# Patient Record
Sex: FEMALE | Race: WHITE | Employment: UNEMPLOYED | URBAN - METROPOLITAN AREA
[De-identification: names, ages, dates, MRNs, and addresses within clinical notes are randomized per-mention and may not be internally consistent; named-entity substitution may affect disease eponyms.]

---

## 2017-01-01 ENCOUNTER — APPOINTMENT (OUTPATIENT)
Dept: CT IMAGING | Age: 27
DRG: 637 | End: 2017-01-01
Attending: EMERGENCY MEDICINE
Payer: SELF-PAY

## 2017-01-01 ENCOUNTER — HOSPITAL ENCOUNTER (OUTPATIENT)
Dept: LAB | Age: 27
Discharge: HOME | End: 2017-05-24

## 2017-01-01 ENCOUNTER — APPOINTMENT (OUTPATIENT)
Dept: GENERAL RADIOLOGY | Age: 27
DRG: 638 | End: 2017-01-01
Attending: EMERGENCY MEDICINE
Payer: SELF-PAY

## 2017-01-01 ENCOUNTER — APPOINTMENT (OUTPATIENT)
Dept: CT IMAGING | Age: 27
DRG: 638 | End: 2017-01-01
Attending: EMERGENCY MEDICINE
Payer: SELF-PAY

## 2017-01-01 ENCOUNTER — APPOINTMENT (OUTPATIENT)
Dept: GENERAL RADIOLOGY | Age: 27
DRG: 637 | End: 2017-01-01
Attending: INTERNAL MEDICINE
Payer: SELF-PAY

## 2017-01-01 ENCOUNTER — HOSPITAL ENCOUNTER (INPATIENT)
Age: 27
LOS: 3 days | Discharge: HOME | DRG: 638 | End: 2017-07-18
Attending: EMERGENCY MEDICINE | Admitting: INTERNAL MEDICINE
Payer: SELF-PAY

## 2017-01-01 ENCOUNTER — HOSPITAL ENCOUNTER (INPATIENT)
Age: 27
LOS: 1 days | Discharge: HOME | DRG: 638 | End: 2017-07-28
Attending: EMERGENCY MEDICINE | Admitting: FAMILY MEDICINE
Payer: SELF-PAY

## 2017-01-01 ENCOUNTER — APPOINTMENT (OUTPATIENT)
Dept: GENERAL RADIOLOGY | Age: 27
DRG: 637 | End: 2017-01-01
Attending: EMERGENCY MEDICINE
Payer: SELF-PAY

## 2017-01-01 ENCOUNTER — HOSPITAL ENCOUNTER (OUTPATIENT)
Dept: LAB | Age: 27
Discharge: HOME | End: 2017-07-12

## 2017-01-01 ENCOUNTER — APPOINTMENT (OUTPATIENT)
Dept: NUCLEAR MEDICINE | Age: 27
DRG: 638 | End: 2017-01-01
Attending: HOSPITALIST
Payer: SELF-PAY

## 2017-01-01 ENCOUNTER — HOSPITAL ENCOUNTER (INPATIENT)
Age: 27
LOS: 2 days | Discharge: HOME | DRG: 637 | End: 2017-04-14
Attending: EMERGENCY MEDICINE | Admitting: INTERNAL MEDICINE
Payer: SELF-PAY

## 2017-01-01 ENCOUNTER — HOSPITAL ENCOUNTER (INPATIENT)
Age: 27
LOS: 4 days | Discharge: HOME | DRG: 637 | End: 2017-03-15
Attending: EMERGENCY MEDICINE | Admitting: INTERNAL MEDICINE
Payer: SELF-PAY

## 2017-01-01 ENCOUNTER — HOSPITAL ENCOUNTER (INPATIENT)
Age: 27
LOS: 3 days | Discharge: HOME | DRG: 637 | End: 2017-02-24
Attending: EMERGENCY MEDICINE | Admitting: INTERNAL MEDICINE
Payer: SELF-PAY

## 2017-01-01 ENCOUNTER — HOSPITAL ENCOUNTER (INPATIENT)
Age: 27
LOS: 2 days | Discharge: HOME | DRG: 638 | End: 2017-05-22
Attending: EMERGENCY MEDICINE | Admitting: INTERNAL MEDICINE
Payer: SELF-PAY

## 2017-01-01 ENCOUNTER — HOSPITAL ENCOUNTER (OUTPATIENT)
Dept: LAB | Age: 27
Discharge: HOME | End: 2017-01-04

## 2017-01-01 VITALS
OXYGEN SATURATION: 99 % | DIASTOLIC BLOOD PRESSURE: 77 MMHG | BODY MASS INDEX: 23.39 KG/M2 | SYSTOLIC BLOOD PRESSURE: 110 MMHG | TEMPERATURE: 98.8 F | HEART RATE: 80 BPM | RESPIRATION RATE: 14 BRPM | WEIGHT: 127.87 LBS

## 2017-01-01 VITALS
WEIGHT: 115 LBS | RESPIRATION RATE: 18 BRPM | TEMPERATURE: 99.3 F | DIASTOLIC BLOOD PRESSURE: 83 MMHG | SYSTOLIC BLOOD PRESSURE: 118 MMHG | BODY MASS INDEX: 18.48 KG/M2 | HEART RATE: 91 BPM | HEIGHT: 66 IN | OXYGEN SATURATION: 100 %

## 2017-01-01 VITALS
RESPIRATION RATE: 16 BRPM | HEIGHT: 66 IN | WEIGHT: 124.78 LBS | BODY MASS INDEX: 20.05 KG/M2 | DIASTOLIC BLOOD PRESSURE: 73 MMHG | SYSTOLIC BLOOD PRESSURE: 106 MMHG | TEMPERATURE: 98.4 F | OXYGEN SATURATION: 100 % | HEART RATE: 84 BPM

## 2017-01-01 VITALS
OXYGEN SATURATION: 98 % | TEMPERATURE: 98 F | WEIGHT: 120.15 LBS | HEIGHT: 62 IN | RESPIRATION RATE: 18 BRPM | DIASTOLIC BLOOD PRESSURE: 66 MMHG | BODY MASS INDEX: 22.11 KG/M2 | HEART RATE: 78 BPM | SYSTOLIC BLOOD PRESSURE: 107 MMHG

## 2017-01-01 VITALS
HEIGHT: 65 IN | BODY MASS INDEX: 18.44 KG/M2 | WEIGHT: 110.67 LBS | OXYGEN SATURATION: 92 % | RESPIRATION RATE: 16 BRPM | TEMPERATURE: 97.5 F | DIASTOLIC BLOOD PRESSURE: 76 MMHG | SYSTOLIC BLOOD PRESSURE: 107 MMHG | HEART RATE: 85 BPM

## 2017-01-01 VITALS
TEMPERATURE: 98.2 F | OXYGEN SATURATION: 98 % | RESPIRATION RATE: 20 BRPM | BODY MASS INDEX: 24.59 KG/M2 | SYSTOLIC BLOOD PRESSURE: 98 MMHG | WEIGHT: 133.6 LBS | HEART RATE: 84 BPM | HEIGHT: 62 IN | DIASTOLIC BLOOD PRESSURE: 63 MMHG

## 2017-01-01 DIAGNOSIS — E10.11 DIABETIC KETOACIDOSIS WITH COMA ASSOCIATED WITH TYPE 1 DIABETES MELLITUS (HCC): Primary | ICD-10-CM

## 2017-01-01 DIAGNOSIS — E08.10 DIABETIC KETOACIDOSIS WITHOUT COMA ASSOCIATED WITH DIABETES MELLITUS DUE TO UNDERLYING CONDITION (HCC): Primary | ICD-10-CM

## 2017-01-01 DIAGNOSIS — G93.40 ACUTE ENCEPHALOPATHY: ICD-10-CM

## 2017-01-01 DIAGNOSIS — D72.829 LEUKOCYTOSIS, UNSPECIFIED TYPE: ICD-10-CM

## 2017-01-01 DIAGNOSIS — R56.9 SEIZURE (HCC): ICD-10-CM

## 2017-01-01 DIAGNOSIS — E10.10 DIABETIC KETOACIDOSIS WITHOUT COMA ASSOCIATED WITH TYPE 1 DIABETES MELLITUS (HCC): Primary | ICD-10-CM

## 2017-01-01 DIAGNOSIS — N17.9 ACUTE RENAL FAILURE, UNSPECIFIED ACUTE RENAL FAILURE TYPE (HCC): ICD-10-CM

## 2017-01-01 DIAGNOSIS — R00.0 SINUS TACHYCARDIA: ICD-10-CM

## 2017-01-01 DIAGNOSIS — E87.20 ACIDOSIS, METABOLIC: ICD-10-CM

## 2017-01-01 DIAGNOSIS — E87.20 LACTIC ACIDOSIS: ICD-10-CM

## 2017-01-01 DIAGNOSIS — E10.10 DKA, TYPE 1, NOT AT GOAL (HCC): Primary | ICD-10-CM

## 2017-01-01 DIAGNOSIS — E86.0 DEHYDRATION: ICD-10-CM

## 2017-01-01 DIAGNOSIS — E08.11 DIABETIC KETOACIDOSIS WITH COMA ASSOCIATED WITH DIABETES MELLITUS DUE TO UNDERLYING CONDITION (HCC): Primary | ICD-10-CM

## 2017-01-01 LAB
ADMINISTERED INITIALS, ADMINIT: NORMAL
ALBUMIN SERPL BCP-MCNC: 2.3 G/DL (ref 3.5–5)
ALBUMIN SERPL BCP-MCNC: 2.3 G/DL (ref 3.5–5)
ALBUMIN SERPL BCP-MCNC: 2.4 G/DL (ref 3.5–5)
ALBUMIN SERPL BCP-MCNC: 2.6 G/DL (ref 3.5–5)
ALBUMIN SERPL BCP-MCNC: 2.6 G/DL (ref 3.5–5)
ALBUMIN SERPL BCP-MCNC: 2.7 G/DL (ref 3.5–5)
ALBUMIN SERPL BCP-MCNC: 2.9 G/DL (ref 3.5–5)
ALBUMIN SERPL BCP-MCNC: 2.9 G/DL (ref 3.5–5)
ALBUMIN SERPL BCP-MCNC: 3.2 G/DL (ref 3.5–5)
ALBUMIN SERPL BCP-MCNC: 3.3 G/DL (ref 3.5–5)
ALBUMIN SERPL BCP-MCNC: 3.3 G/DL (ref 3.5–5)
ALBUMIN SERPL BCP-MCNC: 3.4 G/DL (ref 3.5–5)
ALBUMIN SERPL BCP-MCNC: 3.4 G/DL (ref 3.5–5)
ALBUMIN SERPL BCP-MCNC: 3.8 G/DL (ref 3.5–5)
ALBUMIN/GLOB SERPL: 0.7 {RATIO} (ref 1.1–2.2)
ALBUMIN/GLOB SERPL: 0.8 {RATIO} (ref 1.1–2.2)
ALBUMIN/GLOB SERPL: 0.9 {RATIO} (ref 1.1–2.2)
ALBUMIN/GLOB SERPL: 1 {RATIO} (ref 1.1–2.2)
ALP SERPL-CCNC: 103 U/L (ref 45–117)
ALP SERPL-CCNC: 114 U/L (ref 45–117)
ALP SERPL-CCNC: 120 U/L (ref 45–117)
ALP SERPL-CCNC: 127 U/L (ref 45–117)
ALP SERPL-CCNC: 129 U/L (ref 45–117)
ALP SERPL-CCNC: 131 U/L (ref 45–117)
ALP SERPL-CCNC: 147 U/L (ref 45–117)
ALP SERPL-CCNC: 150 U/L (ref 45–117)
ALP SERPL-CCNC: 160 U/L (ref 45–117)
ALP SERPL-CCNC: 166 U/L (ref 45–117)
ALP SERPL-CCNC: 203 U/L (ref 45–117)
ALP SERPL-CCNC: 210 U/L (ref 45–117)
ALP SERPL-CCNC: 90 U/L (ref 45–117)
ALP SERPL-CCNC: 92 U/L (ref 45–117)
ALT SERPL-CCNC: 16 U/L (ref 12–78)
ALT SERPL-CCNC: 16 U/L (ref 12–78)
ALT SERPL-CCNC: 17 U/L (ref 12–78)
ALT SERPL-CCNC: 20 U/L (ref 12–78)
ALT SERPL-CCNC: 21 U/L (ref 12–78)
ALT SERPL-CCNC: 22 U/L (ref 12–78)
ALT SERPL-CCNC: 25 U/L (ref 12–78)
ALT SERPL-CCNC: 25 U/L (ref 12–78)
ALT SERPL-CCNC: 26 U/L (ref 12–78)
ALT SERPL-CCNC: 27 U/L (ref 12–78)
ALT SERPL-CCNC: 29 U/L (ref 12–78)
ALT SERPL-CCNC: 33 U/L (ref 12–78)
ALT SERPL-CCNC: 37 U/L (ref 12–78)
ALT SERPL-CCNC: 44 U/L (ref 12–78)
AMORPH CRY URNS QL MICRO: ABNORMAL
AMORPH CRY URNS QL MICRO: ABNORMAL
AMPHET UR QL SCN: NEGATIVE
AMYLASE SERPL-CCNC: 144 U/L (ref 25–115)
ANION GAP BLD CALC-SCNC: 10 MMOL/L (ref 5–15)
ANION GAP BLD CALC-SCNC: 11 MMOL/L (ref 5–15)
ANION GAP BLD CALC-SCNC: 12 MMOL/L (ref 5–15)
ANION GAP BLD CALC-SCNC: 13 MMOL/L (ref 5–15)
ANION GAP BLD CALC-SCNC: 13 MMOL/L (ref 5–15)
ANION GAP BLD CALC-SCNC: 14 MMOL/L (ref 5–15)
ANION GAP BLD CALC-SCNC: 15 MMOL/L (ref 5–15)
ANION GAP BLD CALC-SCNC: 17 MMOL/L (ref 5–15)
ANION GAP BLD CALC-SCNC: 17 MMOL/L (ref 5–15)
ANION GAP BLD CALC-SCNC: 18 MMOL/L (ref 5–15)
ANION GAP BLD CALC-SCNC: 19 MMOL/L (ref 5–15)
ANION GAP BLD CALC-SCNC: 20 MMOL/L (ref 5–15)
ANION GAP BLD CALC-SCNC: 21 MMOL/L (ref 5–15)
ANION GAP BLD CALC-SCNC: 22 MMOL/L (ref 5–15)
ANION GAP BLD CALC-SCNC: 22 MMOL/L (ref 5–15)
ANION GAP BLD CALC-SCNC: 25 MMOL/L (ref 5–15)
ANION GAP BLD CALC-SCNC: 26 MMOL/L (ref 5–15)
ANION GAP BLD CALC-SCNC: 28 MMOL/L (ref 5–15)
ANION GAP BLD CALC-SCNC: 34 MMOL/L (ref 5–15)
ANION GAP BLD CALC-SCNC: 34 MMOL/L (ref 5–15)
ANION GAP BLD CALC-SCNC: 7 MMOL/L (ref 5–15)
ANION GAP BLD CALC-SCNC: 8 MMOL/L (ref 5–15)
ANION GAP BLD CALC-SCNC: 9 MMOL/L (ref 5–15)
ANION GAP BLD CALC-SCNC: ABNORMAL MMOL/L (ref 5–15)
APPEARANCE UR: ABNORMAL
APPEARANCE UR: CLEAR
APPEARANCE UR: CLEAR
ARTERIAL PATENCY WRIST A: ABNORMAL
ARTERIAL PATENCY WRIST A: YES
AST SERPL W P-5'-P-CCNC: 13 U/L (ref 15–37)
AST SERPL W P-5'-P-CCNC: 16 U/L (ref 15–37)
AST SERPL W P-5'-P-CCNC: 18 U/L (ref 15–37)
AST SERPL W P-5'-P-CCNC: 21 U/L (ref 15–37)
AST SERPL W P-5'-P-CCNC: 22 U/L (ref 15–37)
AST SERPL W P-5'-P-CCNC: 24 U/L (ref 15–37)
AST SERPL W P-5'-P-CCNC: 25 U/L (ref 15–37)
AST SERPL W P-5'-P-CCNC: 25 U/L (ref 15–37)
AST SERPL W P-5'-P-CCNC: 33 U/L (ref 15–37)
AST SERPL W P-5'-P-CCNC: 35 U/L (ref 15–37)
AST SERPL W P-5'-P-CCNC: 6 U/L (ref 15–37)
AST SERPL W P-5'-P-CCNC: 61 U/L (ref 15–37)
ATRIAL RATE: 104 BPM
ATRIAL RATE: 104 BPM
ATRIAL RATE: 118 BPM
B-OH-BUTYR SERPL-SCNC: >4.42 MMOL/L
B-OH-BUTYR SERPL-SCNC: >4.42 MMOL/L
BACTERIA SPEC CULT: NORMAL
BACTERIA URNS QL MICRO: ABNORMAL /HPF
BACTERIA URNS QL MICRO: NEGATIVE /HPF
BARBITURATES UR QL SCN: NEGATIVE
BASE DEFICIT BLD-SCNC: 17 MMOL/L
BASE DEFICIT BLD-SCNC: 27 MMOL/L
BASE DEFICIT BLD-SCNC: 9 MMOL/L
BASE DEFICIT BLDA-SCNC: 26.5 MMOL/L
BASE DEFICIT BLDA-SCNC: 28 MMOL/L
BASE DEFICIT BLDA-SCNC: 32.3 MMOL/L
BASE DEFICIT BLDV-SCNC: 30.7 MMOL/L
BASE DEFICIT BLDV-SCNC: 34.9 MMOL/L
BASE DEFICIT BLDV-SCNC: <30 MMOL/L
BASE EXCESS BLDA CALC-SCNC: 1 MMOL/L
BASE EXCESS BLDA CALC-SCNC: 1.3 MMOL/L
BASE EXCESS BLDA CALC-SCNC: 1.4 MMOL/L
BASOPHILS # BLD AUTO: 0 K/UL
BASOPHILS # BLD AUTO: 0 K/UL (ref 0–0.1)
BASOPHILS # BLD AUTO: 0.1 K/UL
BASOPHILS # BLD AUTO: 0.3 K/UL
BASOPHILS # BLD AUTO: 0.3 K/UL (ref 0–0.1)
BASOPHILS # BLD: 0 %
BASOPHILS # BLD: 0 % (ref 0–1)
BASOPHILS # BLD: 1 %
BASOPHILS # BLD: 1 % (ref 0–1)
BASOPHILS # BLD: 1 % (ref 0–1)
BASOPHILS # BLD: 2 %
BDY SITE: ABNORMAL
BDY SITE: NORMAL
BENZODIAZ UR QL: NEGATIVE
BENZODIAZ UR QL: POSITIVE
BILIRUB SERPL-MCNC: 0.4 MG/DL (ref 0.2–1)
BILIRUB SERPL-MCNC: 0.5 MG/DL (ref 0.2–1)
BILIRUB SERPL-MCNC: 0.6 MG/DL (ref 0.2–1)
BILIRUB SERPL-MCNC: 0.6 MG/DL (ref 0.2–1)
BILIRUB UR QL CFM: NEGATIVE
BILIRUB UR QL: NEGATIVE
BNP SERPL-MCNC: 16 PG/ML (ref 0–100)
BREATHS.SPONTANEOUS ON VENT: 14
BREATHS.SPONTANEOUS ON VENT: 28
BREATHS.SPONTANEOUS ON VENT: 8
BREATHS.SPONTANEOUS ON VENT: 9
BUN BLD-MCNC: 27 MG/DL (ref 9–20)
BUN SERPL-MCNC: 10 MG/DL (ref 6–20)
BUN SERPL-MCNC: 12 MG/DL (ref 6–20)
BUN SERPL-MCNC: 12 MG/DL (ref 6–20)
BUN SERPL-MCNC: 13 MG/DL (ref 6–20)
BUN SERPL-MCNC: 14 MG/DL (ref 6–20)
BUN SERPL-MCNC: 15 MG/DL (ref 6–20)
BUN SERPL-MCNC: 15 MG/DL (ref 6–20)
BUN SERPL-MCNC: 16 MG/DL (ref 6–20)
BUN SERPL-MCNC: 17 MG/DL (ref 6–20)
BUN SERPL-MCNC: 17 MG/DL (ref 6–20)
BUN SERPL-MCNC: 18 MG/DL (ref 6–20)
BUN SERPL-MCNC: 19 MG/DL (ref 6–20)
BUN SERPL-MCNC: 19 MG/DL (ref 6–20)
BUN SERPL-MCNC: 21 MG/DL (ref 6–20)
BUN SERPL-MCNC: 22 MG/DL (ref 6–20)
BUN SERPL-MCNC: 22 MG/DL (ref 6–20)
BUN SERPL-MCNC: 23 MG/DL (ref 6–20)
BUN SERPL-MCNC: 23 MG/DL (ref 6–20)
BUN SERPL-MCNC: 24 MG/DL (ref 6–20)
BUN SERPL-MCNC: 24 MG/DL (ref 6–20)
BUN SERPL-MCNC: 25 MG/DL (ref 6–20)
BUN SERPL-MCNC: 26 MG/DL (ref 6–20)
BUN SERPL-MCNC: 26 MG/DL (ref 6–20)
BUN SERPL-MCNC: 28 MG/DL (ref 6–20)
BUN SERPL-MCNC: 29 MG/DL (ref 6–20)
BUN SERPL-MCNC: 30 MG/DL (ref 6–20)
BUN SERPL-MCNC: 32 MG/DL (ref 6–20)
BUN SERPL-MCNC: 33 MG/DL (ref 6–20)
BUN SERPL-MCNC: 34 MG/DL (ref 6–20)
BUN SERPL-MCNC: 5 MG/DL (ref 6–20)
BUN SERPL-MCNC: 6 MG/DL (ref 6–20)
BUN SERPL-MCNC: 6 MG/DL (ref 6–20)
BUN SERPL-MCNC: 7 MG/DL (ref 6–20)
BUN SERPL-MCNC: 7 MG/DL (ref 6–20)
BUN SERPL-MCNC: 8 MG/DL (ref 6–20)
BUN SERPL-MCNC: 9 MG/DL (ref 6–20)
BUN SERPL-MCNC: 9 MG/DL (ref 6–20)
BUN/CREAT SERPL: 10 (ref 12–20)
BUN/CREAT SERPL: 11 (ref 12–20)
BUN/CREAT SERPL: 12 (ref 12–20)
BUN/CREAT SERPL: 13 (ref 12–20)
BUN/CREAT SERPL: 14 (ref 12–20)
BUN/CREAT SERPL: 15 (ref 12–20)
BUN/CREAT SERPL: 16 (ref 12–20)
BUN/CREAT SERPL: 17 (ref 12–20)
BUN/CREAT SERPL: 17 (ref 12–20)
BUN/CREAT SERPL: 18 (ref 12–20)
BUN/CREAT SERPL: 19 (ref 12–20)
BUN/CREAT SERPL: 20 (ref 12–20)
BUN/CREAT SERPL: 20 (ref 12–20)
BUN/CREAT SERPL: 25 (ref 12–20)
BUN/CREAT SERPL: 27 (ref 12–20)
BUN/CREAT SERPL: 28 (ref 12–20)
BUN/CREAT SERPL: 32 (ref 12–20)
BUN/CREAT SERPL: 7 (ref 12–20)
BUN/CREAT SERPL: 7 (ref 12–20)
BUN/CREAT SERPL: 8 (ref 12–20)
BUN/CREAT SERPL: 9 (ref 12–20)
CA-I BLD-MCNC: 1.1 MMOL/L (ref 1.12–1.32)
CA-I BLD-SCNC: 1.34 MMOL/L (ref 1.12–1.32)
CALCIUM SERPL-MCNC: 6.7 MG/DL (ref 8.5–10.1)
CALCIUM SERPL-MCNC: 6.8 MG/DL (ref 8.5–10.1)
CALCIUM SERPL-MCNC: 6.8 MG/DL (ref 8.5–10.1)
CALCIUM SERPL-MCNC: 7 MG/DL (ref 8.5–10.1)
CALCIUM SERPL-MCNC: 7.1 MG/DL (ref 8.5–10.1)
CALCIUM SERPL-MCNC: 7.1 MG/DL (ref 8.5–10.1)
CALCIUM SERPL-MCNC: 7.2 MG/DL (ref 8.5–10.1)
CALCIUM SERPL-MCNC: 7.2 MG/DL (ref 8.5–10.1)
CALCIUM SERPL-MCNC: 7.3 MG/DL (ref 8.5–10.1)
CALCIUM SERPL-MCNC: 7.3 MG/DL (ref 8.5–10.1)
CALCIUM SERPL-MCNC: 7.4 MG/DL (ref 8.5–10.1)
CALCIUM SERPL-MCNC: 7.5 MG/DL (ref 8.5–10.1)
CALCIUM SERPL-MCNC: 7.5 MG/DL (ref 8.5–10.1)
CALCIUM SERPL-MCNC: 7.6 MG/DL (ref 8.5–10.1)
CALCIUM SERPL-MCNC: 7.6 MG/DL (ref 8.5–10.1)
CALCIUM SERPL-MCNC: 7.7 MG/DL (ref 8.5–10.1)
CALCIUM SERPL-MCNC: 7.7 MG/DL (ref 8.5–10.1)
CALCIUM SERPL-MCNC: 7.8 MG/DL (ref 8.5–10.1)
CALCIUM SERPL-MCNC: 7.9 MG/DL (ref 8.5–10.1)
CALCIUM SERPL-MCNC: 8 MG/DL (ref 8.5–10.1)
CALCIUM SERPL-MCNC: 8.1 MG/DL (ref 8.5–10.1)
CALCIUM SERPL-MCNC: 8.2 MG/DL (ref 8.5–10.1)
CALCIUM SERPL-MCNC: 8.3 MG/DL (ref 8.5–10.1)
CALCIUM SERPL-MCNC: 8.4 MG/DL (ref 8.5–10.1)
CALCIUM SERPL-MCNC: 8.5 MG/DL (ref 8.5–10.1)
CALCIUM SERPL-MCNC: 8.6 MG/DL (ref 8.5–10.1)
CALCIUM SERPL-MCNC: 8.6 MG/DL (ref 8.5–10.1)
CALCIUM SERPL-MCNC: 8.7 MG/DL (ref 8.5–10.1)
CALCIUM SERPL-MCNC: 8.8 MG/DL (ref 8.5–10.1)
CALCIUM SERPL-MCNC: 8.9 MG/DL (ref 8.5–10.1)
CALCIUM SERPL-MCNC: 8.9 MG/DL (ref 8.5–10.1)
CALCIUM SERPL-MCNC: 9.3 MG/DL (ref 8.5–10.1)
CALCIUM SERPL-MCNC: 9.5 MG/DL (ref 8.5–10.1)
CALCIUM SERPL-MCNC: 9.7 MG/DL (ref 8.5–10.1)
CALCULATED P AXIS, ECG09: 65 DEGREES
CALCULATED P AXIS, ECG09: 66 DEGREES
CALCULATED P AXIS, ECG09: 70 DEGREES
CALCULATED R AXIS, ECG10: 63 DEGREES
CALCULATED R AXIS, ECG10: 64 DEGREES
CALCULATED R AXIS, ECG10: 66 DEGREES
CALCULATED T AXIS, ECG11: 13 DEGREES
CALCULATED T AXIS, ECG11: 22 DEGREES
CALCULATED T AXIS, ECG11: 29 DEGREES
CANNABINOIDS UR QL SCN: NEGATIVE
CC UR VC: NORMAL
CC UR VC: NORMAL
CHLORIDE BLD-SCNC: 110 MMOL/L (ref 98–107)
CHLORIDE SERPL-SCNC: 100 MMOL/L (ref 97–108)
CHLORIDE SERPL-SCNC: 101 MMOL/L (ref 97–108)
CHLORIDE SERPL-SCNC: 103 MMOL/L (ref 97–108)
CHLORIDE SERPL-SCNC: 103 MMOL/L (ref 97–108)
CHLORIDE SERPL-SCNC: 104 MMOL/L (ref 97–108)
CHLORIDE SERPL-SCNC: 106 MMOL/L (ref 97–108)
CHLORIDE SERPL-SCNC: 108 MMOL/L (ref 97–108)
CHLORIDE SERPL-SCNC: 109 MMOL/L (ref 97–108)
CHLORIDE SERPL-SCNC: 110 MMOL/L (ref 97–108)
CHLORIDE SERPL-SCNC: 111 MMOL/L (ref 97–108)
CHLORIDE SERPL-SCNC: 112 MMOL/L (ref 97–108)
CHLORIDE SERPL-SCNC: 113 MMOL/L (ref 97–108)
CHLORIDE SERPL-SCNC: 114 MMOL/L (ref 97–108)
CHLORIDE SERPL-SCNC: 115 MMOL/L (ref 97–108)
CHLORIDE SERPL-SCNC: 117 MMOL/L (ref 97–108)
CHLORIDE SERPL-SCNC: 120 MMOL/L (ref 97–108)
CHLORIDE SERPL-SCNC: 123 MMOL/L (ref 97–108)
CHLORIDE SERPL-SCNC: 86 MMOL/L (ref 97–108)
CHLORIDE SERPL-SCNC: 90 MMOL/L (ref 97–108)
CHLORIDE SERPL-SCNC: 91 MMOL/L (ref 97–108)
CHLORIDE SERPL-SCNC: 92 MMOL/L (ref 97–108)
CHLORIDE SERPL-SCNC: 94 MMOL/L (ref 97–108)
CHLORIDE SERPL-SCNC: 96 MMOL/L (ref 97–108)
CHLORIDE SERPL-SCNC: 97 MMOL/L (ref 97–108)
CHLORIDE SERPL-SCNC: 99 MMOL/L (ref 97–108)
CHOLEST SERPL-MCNC: 311 MG/DL
CHOLEST SERPL-MCNC: 324 MG/DL
CHOLEST SERPL-MCNC: 328 MG/DL
CK SERPL-CCNC: 129 U/L (ref 26–192)
CO2 BLD-SCNC: <5 MMOL/L (ref 21–32)
CO2 SERPL-SCNC: 11 MMOL/L (ref 21–32)
CO2 SERPL-SCNC: 11 MMOL/L (ref 21–32)
CO2 SERPL-SCNC: 12 MMOL/L (ref 21–32)
CO2 SERPL-SCNC: 14 MMOL/L (ref 21–32)
CO2 SERPL-SCNC: 14 MMOL/L (ref 21–32)
CO2 SERPL-SCNC: 15 MMOL/L (ref 21–32)
CO2 SERPL-SCNC: 17 MMOL/L (ref 21–32)
CO2 SERPL-SCNC: 17 MMOL/L (ref 21–32)
CO2 SERPL-SCNC: 18 MMOL/L (ref 21–32)
CO2 SERPL-SCNC: 19 MMOL/L (ref 21–32)
CO2 SERPL-SCNC: 21 MMOL/L (ref 21–32)
CO2 SERPL-SCNC: 21 MMOL/L (ref 21–32)
CO2 SERPL-SCNC: 22 MMOL/L (ref 21–32)
CO2 SERPL-SCNC: 23 MMOL/L (ref 21–32)
CO2 SERPL-SCNC: 24 MMOL/L (ref 21–32)
CO2 SERPL-SCNC: 25 MMOL/L (ref 21–32)
CO2 SERPL-SCNC: 26 MMOL/L (ref 21–32)
CO2 SERPL-SCNC: 26 MMOL/L (ref 21–32)
CO2 SERPL-SCNC: 27 MMOL/L (ref 21–32)
CO2 SERPL-SCNC: 28 MMOL/L (ref 21–32)
CO2 SERPL-SCNC: 30 MMOL/L (ref 21–32)
CO2 SERPL-SCNC: 30 MMOL/L (ref 21–32)
CO2 SERPL-SCNC: 5 MMOL/L (ref 21–32)
CO2 SERPL-SCNC: 7 MMOL/L (ref 21–32)
CO2 SERPL-SCNC: 8 MMOL/L (ref 21–32)
CO2 SERPL-SCNC: 9 MMOL/L (ref 21–32)
CO2 SERPL-SCNC: <5 MMOL/L (ref 21–32)
COCAINE UR QL SCN: NEGATIVE
COCAINE UR QL SCN: POSITIVE
COLOR UR: ABNORMAL
CREAT BLD-MCNC: 0.9 MG/DL (ref 0.6–1.3)
CREAT SERPL-MCNC: 0.56 MG/DL (ref 0.55–1.02)
CREAT SERPL-MCNC: 0.56 MG/DL (ref 0.55–1.02)
CREAT SERPL-MCNC: 0.57 MG/DL (ref 0.55–1.02)
CREAT SERPL-MCNC: 0.61 MG/DL (ref 0.55–1.02)
CREAT SERPL-MCNC: 0.64 MG/DL (ref 0.55–1.02)
CREAT SERPL-MCNC: 0.7 MG/DL (ref 0.55–1.02)
CREAT SERPL-MCNC: 0.78 MG/DL (ref 0.55–1.02)
CREAT SERPL-MCNC: 0.79 MG/DL (ref 0.55–1.02)
CREAT SERPL-MCNC: 0.91 MG/DL (ref 0.55–1.02)
CREAT SERPL-MCNC: 0.94 MG/DL (ref 0.55–1.02)
CREAT SERPL-MCNC: 0.99 MG/DL (ref 0.55–1.02)
CREAT SERPL-MCNC: 1 MG/DL (ref 0.55–1.02)
CREAT SERPL-MCNC: 1.03 MG/DL (ref 0.55–1.02)
CREAT SERPL-MCNC: 1.03 MG/DL (ref 0.55–1.02)
CREAT SERPL-MCNC: 1.04 MG/DL (ref 0.55–1.02)
CREAT SERPL-MCNC: 1.04 MG/DL (ref 0.55–1.02)
CREAT SERPL-MCNC: 1.05 MG/DL (ref 0.55–1.02)
CREAT SERPL-MCNC: 1.06 MG/DL (ref 0.55–1.02)
CREAT SERPL-MCNC: 1.06 MG/DL (ref 0.55–1.02)
CREAT SERPL-MCNC: 1.07 MG/DL (ref 0.55–1.02)
CREAT SERPL-MCNC: 1.09 MG/DL (ref 0.55–1.02)
CREAT SERPL-MCNC: 1.14 MG/DL (ref 0.55–1.02)
CREAT SERPL-MCNC: 1.23 MG/DL (ref 0.55–1.02)
CREAT SERPL-MCNC: 1.27 MG/DL (ref 0.55–1.02)
CREAT SERPL-MCNC: 1.32 MG/DL (ref 0.55–1.02)
CREAT SERPL-MCNC: 1.37 MG/DL (ref 0.55–1.02)
CREAT SERPL-MCNC: 1.38 MG/DL (ref 0.55–1.02)
CREAT SERPL-MCNC: 1.42 MG/DL (ref 0.55–1.02)
CREAT SERPL-MCNC: 1.43 MG/DL (ref 0.55–1.02)
CREAT SERPL-MCNC: 1.46 MG/DL (ref 0.55–1.02)
CREAT SERPL-MCNC: 1.59 MG/DL (ref 0.55–1.02)
CREAT SERPL-MCNC: 1.61 MG/DL (ref 0.55–1.02)
CREAT SERPL-MCNC: 1.64 MG/DL (ref 0.55–1.02)
CREAT SERPL-MCNC: 1.7 MG/DL (ref 0.55–1.02)
CREAT SERPL-MCNC: 1.76 MG/DL (ref 0.55–1.02)
CREAT SERPL-MCNC: 1.78 MG/DL (ref 0.55–1.02)
CREAT SERPL-MCNC: 1.8 MG/DL (ref 0.55–1.02)
CREAT SERPL-MCNC: 1.81 MG/DL (ref 0.55–1.02)
CREAT SERPL-MCNC: 1.84 MG/DL (ref 0.55–1.02)
CREAT SERPL-MCNC: 1.87 MG/DL (ref 0.55–1.02)
CREAT SERPL-MCNC: 2.3 MG/DL (ref 0.55–1.02)
D50 ADMINISTERED, D50ADM: 0 ML
D50 ORDER, D50ORD: 0 ML
DIAGNOSIS, 93000: NORMAL
DIFFERENTIAL METHOD BLD: ABNORMAL
DRUG SCRN COMMENT,DRGCM: ABNORMAL
DRUG SCRN COMMENT,DRGCM: ABNORMAL
DRUG SCRN COMMENT,DRGCM: NORMAL
EOSINOPHIL # BLD: 0 K/UL
EOSINOPHIL # BLD: 0 K/UL (ref 0–0.4)
EOSINOPHIL # BLD: 0.1 K/UL (ref 0–0.4)
EOSINOPHIL NFR BLD: 0 %
EOSINOPHIL NFR BLD: 0 % (ref 0–7)
EOSINOPHIL NFR BLD: 1 % (ref 0–7)
EOSINOPHIL NFR BLD: 2 % (ref 0–7)
EPAP/CPAP/PEEP, PAPEEP: 5
EPITH CASTS URNS QL MICRO: ABNORMAL /LPF
ERYTHROCYTE [DISTWIDTH] IN BLOOD BY AUTOMATED COUNT: 13.4 % (ref 11.5–14.5)
ERYTHROCYTE [DISTWIDTH] IN BLOOD BY AUTOMATED COUNT: 13.5 % (ref 11.5–14.5)
ERYTHROCYTE [DISTWIDTH] IN BLOOD BY AUTOMATED COUNT: 14.3 % (ref 11.5–14.5)
ERYTHROCYTE [DISTWIDTH] IN BLOOD BY AUTOMATED COUNT: 14.6 % (ref 11.5–14.5)
ERYTHROCYTE [DISTWIDTH] IN BLOOD BY AUTOMATED COUNT: 14.6 % (ref 11.5–14.5)
ERYTHROCYTE [DISTWIDTH] IN BLOOD BY AUTOMATED COUNT: 14.7 % (ref 11.5–14.5)
ERYTHROCYTE [DISTWIDTH] IN BLOOD BY AUTOMATED COUNT: 14.8 % (ref 11.5–14.5)
ERYTHROCYTE [DISTWIDTH] IN BLOOD BY AUTOMATED COUNT: 14.9 % (ref 11.5–14.5)
ERYTHROCYTE [DISTWIDTH] IN BLOOD BY AUTOMATED COUNT: 15 % (ref 11.5–14.5)
ERYTHROCYTE [DISTWIDTH] IN BLOOD BY AUTOMATED COUNT: 15.1 % (ref 11.5–14.5)
EST. AVERAGE GLUCOSE BLD GHB EST-MCNC: 341 MG/DL
EST. AVERAGE GLUCOSE BLD GHB EST-MCNC: 352 MG/DL
EST. AVERAGE GLUCOSE BLD GHB EST-MCNC: 355 MG/DL
EST. AVERAGE GLUCOSE BLD GHB EST-MCNC: 358 MG/DL
EST. AVERAGE GLUCOSE BLD GHB EST-MCNC: 361 MG/DL
EST. AVERAGE GLUCOSE BLD GHB EST-MCNC: 361 MG/DL
EST. AVERAGE GLUCOSE BLD GHB EST-MCNC: 367 MG/DL
EST. AVERAGE GLUCOSE BLD GHB EST-MCNC: 372 MG/DL
EST. AVERAGE GLUCOSE BLD GHB EST-MCNC: 372 MG/DL
EST. AVERAGE GLUCOSE BLD GHB EST-MCNC: 381 MG/DL
ETHANOL SERPL-MCNC: <10 MG/DL
ETHANOL SERPL-MCNC: <10 MG/DL
FIO2 ON VENT: 40 %
GAS FLOW.O2 O2 DELIVERY SYS: ABNORMAL L/MIN
GAS FLOW.O2 SETTING OXYMISER: 16 L/MIN
GLOBULIN SER CALC-MCNC: 2.9 G/DL (ref 2–4)
GLOBULIN SER CALC-MCNC: 3 G/DL (ref 2–4)
GLOBULIN SER CALC-MCNC: 3 G/DL (ref 2–4)
GLOBULIN SER CALC-MCNC: 3.1 G/DL (ref 2–4)
GLOBULIN SER CALC-MCNC: 3.2 G/DL (ref 2–4)
GLOBULIN SER CALC-MCNC: 3.3 G/DL (ref 2–4)
GLOBULIN SER CALC-MCNC: 3.5 G/DL (ref 2–4)
GLOBULIN SER CALC-MCNC: 3.5 G/DL (ref 2–4)
GLOBULIN SER CALC-MCNC: 3.6 G/DL (ref 2–4)
GLOBULIN SER CALC-MCNC: 3.7 G/DL (ref 2–4)
GLOBULIN SER CALC-MCNC: 3.9 G/DL (ref 2–4)
GLOBULIN SER CALC-MCNC: 4.1 G/DL (ref 2–4)
GLOBULIN SER CALC-MCNC: 4.1 G/DL (ref 2–4)
GLOBULIN SER CALC-MCNC: 4.5 G/DL (ref 2–4)
GLSCOM COMMENTS: NORMAL
GLUCOSE BLD STRIP.AUTO-MCNC: 106 MG/DL (ref 65–100)
GLUCOSE BLD STRIP.AUTO-MCNC: 106 MG/DL (ref 65–100)
GLUCOSE BLD STRIP.AUTO-MCNC: 108 MG/DL (ref 65–100)
GLUCOSE BLD STRIP.AUTO-MCNC: 113 MG/DL (ref 65–100)
GLUCOSE BLD STRIP.AUTO-MCNC: 113 MG/DL (ref 65–100)
GLUCOSE BLD STRIP.AUTO-MCNC: 115 MG/DL (ref 65–100)
GLUCOSE BLD STRIP.AUTO-MCNC: 115 MG/DL (ref 65–100)
GLUCOSE BLD STRIP.AUTO-MCNC: 116 MG/DL (ref 65–100)
GLUCOSE BLD STRIP.AUTO-MCNC: 117 MG/DL (ref 65–100)
GLUCOSE BLD STRIP.AUTO-MCNC: 121 MG/DL (ref 65–100)
GLUCOSE BLD STRIP.AUTO-MCNC: 123 MG/DL (ref 65–100)
GLUCOSE BLD STRIP.AUTO-MCNC: 123 MG/DL (ref 65–100)
GLUCOSE BLD STRIP.AUTO-MCNC: 128 MG/DL (ref 65–100)
GLUCOSE BLD STRIP.AUTO-MCNC: 129 MG/DL (ref 65–100)
GLUCOSE BLD STRIP.AUTO-MCNC: 129 MG/DL (ref 65–100)
GLUCOSE BLD STRIP.AUTO-MCNC: 130 MG/DL (ref 65–100)
GLUCOSE BLD STRIP.AUTO-MCNC: 131 MG/DL (ref 65–100)
GLUCOSE BLD STRIP.AUTO-MCNC: 133 MG/DL (ref 65–100)
GLUCOSE BLD STRIP.AUTO-MCNC: 135 MG/DL (ref 65–100)
GLUCOSE BLD STRIP.AUTO-MCNC: 135 MG/DL (ref 65–100)
GLUCOSE BLD STRIP.AUTO-MCNC: 136 MG/DL (ref 65–100)
GLUCOSE BLD STRIP.AUTO-MCNC: 136 MG/DL (ref 65–100)
GLUCOSE BLD STRIP.AUTO-MCNC: 137 MG/DL (ref 65–100)
GLUCOSE BLD STRIP.AUTO-MCNC: 138 MG/DL (ref 65–100)
GLUCOSE BLD STRIP.AUTO-MCNC: 139 MG/DL (ref 65–100)
GLUCOSE BLD STRIP.AUTO-MCNC: 139 MG/DL (ref 65–100)
GLUCOSE BLD STRIP.AUTO-MCNC: 141 MG/DL (ref 65–100)
GLUCOSE BLD STRIP.AUTO-MCNC: 142 MG/DL (ref 65–100)
GLUCOSE BLD STRIP.AUTO-MCNC: 144 MG/DL (ref 65–100)
GLUCOSE BLD STRIP.AUTO-MCNC: 145 MG/DL (ref 65–100)
GLUCOSE BLD STRIP.AUTO-MCNC: 145 MG/DL (ref 65–100)
GLUCOSE BLD STRIP.AUTO-MCNC: 148 MG/DL (ref 65–100)
GLUCOSE BLD STRIP.AUTO-MCNC: 149 MG/DL (ref 65–100)
GLUCOSE BLD STRIP.AUTO-MCNC: 150 MG/DL (ref 65–100)
GLUCOSE BLD STRIP.AUTO-MCNC: 151 MG/DL (ref 65–100)
GLUCOSE BLD STRIP.AUTO-MCNC: 151 MG/DL (ref 65–100)
GLUCOSE BLD STRIP.AUTO-MCNC: 152 MG/DL (ref 65–100)
GLUCOSE BLD STRIP.AUTO-MCNC: 153 MG/DL (ref 65–100)
GLUCOSE BLD STRIP.AUTO-MCNC: 155 MG/DL (ref 65–100)
GLUCOSE BLD STRIP.AUTO-MCNC: 155 MG/DL (ref 65–100)
GLUCOSE BLD STRIP.AUTO-MCNC: 156 MG/DL (ref 65–100)
GLUCOSE BLD STRIP.AUTO-MCNC: 157 MG/DL (ref 65–100)
GLUCOSE BLD STRIP.AUTO-MCNC: 157 MG/DL (ref 65–100)
GLUCOSE BLD STRIP.AUTO-MCNC: 158 MG/DL (ref 65–100)
GLUCOSE BLD STRIP.AUTO-MCNC: 159 MG/DL (ref 65–100)
GLUCOSE BLD STRIP.AUTO-MCNC: 159 MG/DL (ref 65–100)
GLUCOSE BLD STRIP.AUTO-MCNC: 162 MG/DL (ref 65–100)
GLUCOSE BLD STRIP.AUTO-MCNC: 163 MG/DL (ref 65–100)
GLUCOSE BLD STRIP.AUTO-MCNC: 163 MG/DL (ref 65–100)
GLUCOSE BLD STRIP.AUTO-MCNC: 165 MG/DL (ref 65–100)
GLUCOSE BLD STRIP.AUTO-MCNC: 166 MG/DL (ref 65–100)
GLUCOSE BLD STRIP.AUTO-MCNC: 166 MG/DL (ref 65–100)
GLUCOSE BLD STRIP.AUTO-MCNC: 167 MG/DL (ref 65–100)
GLUCOSE BLD STRIP.AUTO-MCNC: 168 MG/DL (ref 65–100)
GLUCOSE BLD STRIP.AUTO-MCNC: 169 MG/DL (ref 65–100)
GLUCOSE BLD STRIP.AUTO-MCNC: 170 MG/DL (ref 65–100)
GLUCOSE BLD STRIP.AUTO-MCNC: 171 MG/DL (ref 65–100)
GLUCOSE BLD STRIP.AUTO-MCNC: 175 MG/DL (ref 65–100)
GLUCOSE BLD STRIP.AUTO-MCNC: 179 MG/DL (ref 65–100)
GLUCOSE BLD STRIP.AUTO-MCNC: 179 MG/DL (ref 65–100)
GLUCOSE BLD STRIP.AUTO-MCNC: 181 MG/DL (ref 65–100)
GLUCOSE BLD STRIP.AUTO-MCNC: 184 MG/DL (ref 65–100)
GLUCOSE BLD STRIP.AUTO-MCNC: 184 MG/DL (ref 65–100)
GLUCOSE BLD STRIP.AUTO-MCNC: 185 MG/DL (ref 65–100)
GLUCOSE BLD STRIP.AUTO-MCNC: 186 MG/DL (ref 65–100)
GLUCOSE BLD STRIP.AUTO-MCNC: 187 MG/DL (ref 65–100)
GLUCOSE BLD STRIP.AUTO-MCNC: 187 MG/DL (ref 65–100)
GLUCOSE BLD STRIP.AUTO-MCNC: 188 MG/DL (ref 65–100)
GLUCOSE BLD STRIP.AUTO-MCNC: 188 MG/DL (ref 65–100)
GLUCOSE BLD STRIP.AUTO-MCNC: 189 MG/DL (ref 65–100)
GLUCOSE BLD STRIP.AUTO-MCNC: 189 MG/DL (ref 65–100)
GLUCOSE BLD STRIP.AUTO-MCNC: 191 MG/DL (ref 65–100)
GLUCOSE BLD STRIP.AUTO-MCNC: 192 MG/DL (ref 65–100)
GLUCOSE BLD STRIP.AUTO-MCNC: 194 MG/DL (ref 65–100)
GLUCOSE BLD STRIP.AUTO-MCNC: 194 MG/DL (ref 65–100)
GLUCOSE BLD STRIP.AUTO-MCNC: 196 MG/DL (ref 65–100)
GLUCOSE BLD STRIP.AUTO-MCNC: 197 MG/DL (ref 65–100)
GLUCOSE BLD STRIP.AUTO-MCNC: 198 MG/DL (ref 65–100)
GLUCOSE BLD STRIP.AUTO-MCNC: 200 MG/DL (ref 65–100)
GLUCOSE BLD STRIP.AUTO-MCNC: 201 MG/DL (ref 65–100)
GLUCOSE BLD STRIP.AUTO-MCNC: 201 MG/DL (ref 65–100)
GLUCOSE BLD STRIP.AUTO-MCNC: 202 MG/DL (ref 65–100)
GLUCOSE BLD STRIP.AUTO-MCNC: 203 MG/DL (ref 65–100)
GLUCOSE BLD STRIP.AUTO-MCNC: 204 MG/DL (ref 65–100)
GLUCOSE BLD STRIP.AUTO-MCNC: 207 MG/DL (ref 65–100)
GLUCOSE BLD STRIP.AUTO-MCNC: 208 MG/DL (ref 65–100)
GLUCOSE BLD STRIP.AUTO-MCNC: 210 MG/DL (ref 65–100)
GLUCOSE BLD STRIP.AUTO-MCNC: 211 MG/DL (ref 65–100)
GLUCOSE BLD STRIP.AUTO-MCNC: 211 MG/DL (ref 65–100)
GLUCOSE BLD STRIP.AUTO-MCNC: 213 MG/DL (ref 65–100)
GLUCOSE BLD STRIP.AUTO-MCNC: 213 MG/DL (ref 65–100)
GLUCOSE BLD STRIP.AUTO-MCNC: 216 MG/DL (ref 65–100)
GLUCOSE BLD STRIP.AUTO-MCNC: 217 MG/DL (ref 65–100)
GLUCOSE BLD STRIP.AUTO-MCNC: 218 MG/DL (ref 65–100)
GLUCOSE BLD STRIP.AUTO-MCNC: 225 MG/DL (ref 65–100)
GLUCOSE BLD STRIP.AUTO-MCNC: 226 MG/DL (ref 65–100)
GLUCOSE BLD STRIP.AUTO-MCNC: 226 MG/DL (ref 65–100)
GLUCOSE BLD STRIP.AUTO-MCNC: 230 MG/DL (ref 65–100)
GLUCOSE BLD STRIP.AUTO-MCNC: 233 MG/DL (ref 65–100)
GLUCOSE BLD STRIP.AUTO-MCNC: 234 MG/DL (ref 65–100)
GLUCOSE BLD STRIP.AUTO-MCNC: 234 MG/DL (ref 65–100)
GLUCOSE BLD STRIP.AUTO-MCNC: 238 MG/DL (ref 65–100)
GLUCOSE BLD STRIP.AUTO-MCNC: 239 MG/DL (ref 65–100)
GLUCOSE BLD STRIP.AUTO-MCNC: 240 MG/DL (ref 65–100)
GLUCOSE BLD STRIP.AUTO-MCNC: 241 MG/DL (ref 65–100)
GLUCOSE BLD STRIP.AUTO-MCNC: 243 MG/DL (ref 65–100)
GLUCOSE BLD STRIP.AUTO-MCNC: 246 MG/DL (ref 65–100)
GLUCOSE BLD STRIP.AUTO-MCNC: 247 MG/DL (ref 65–100)
GLUCOSE BLD STRIP.AUTO-MCNC: 247 MG/DL (ref 65–100)
GLUCOSE BLD STRIP.AUTO-MCNC: 248 MG/DL (ref 65–100)
GLUCOSE BLD STRIP.AUTO-MCNC: 251 MG/DL (ref 65–100)
GLUCOSE BLD STRIP.AUTO-MCNC: 254 MG/DL (ref 65–100)
GLUCOSE BLD STRIP.AUTO-MCNC: 257 MG/DL (ref 65–100)
GLUCOSE BLD STRIP.AUTO-MCNC: 269 MG/DL (ref 65–100)
GLUCOSE BLD STRIP.AUTO-MCNC: 271 MG/DL (ref 65–100)
GLUCOSE BLD STRIP.AUTO-MCNC: 278 MG/DL (ref 65–100)
GLUCOSE BLD STRIP.AUTO-MCNC: 281 MG/DL (ref 65–100)
GLUCOSE BLD STRIP.AUTO-MCNC: 284 MG/DL (ref 65–100)
GLUCOSE BLD STRIP.AUTO-MCNC: 285 MG/DL (ref 65–100)
GLUCOSE BLD STRIP.AUTO-MCNC: 285 MG/DL (ref 65–100)
GLUCOSE BLD STRIP.AUTO-MCNC: 288 MG/DL (ref 65–100)
GLUCOSE BLD STRIP.AUTO-MCNC: 289 MG/DL (ref 65–100)
GLUCOSE BLD STRIP.AUTO-MCNC: 293 MG/DL (ref 65–100)
GLUCOSE BLD STRIP.AUTO-MCNC: 294 MG/DL (ref 65–100)
GLUCOSE BLD STRIP.AUTO-MCNC: 297 MG/DL (ref 65–100)
GLUCOSE BLD STRIP.AUTO-MCNC: 300 MG/DL (ref 65–100)
GLUCOSE BLD STRIP.AUTO-MCNC: 300 MG/DL (ref 65–100)
GLUCOSE BLD STRIP.AUTO-MCNC: 307 MG/DL (ref 65–100)
GLUCOSE BLD STRIP.AUTO-MCNC: 310 MG/DL (ref 65–100)
GLUCOSE BLD STRIP.AUTO-MCNC: 311 MG/DL (ref 65–100)
GLUCOSE BLD STRIP.AUTO-MCNC: 312 MG/DL (ref 65–100)
GLUCOSE BLD STRIP.AUTO-MCNC: 314 MG/DL (ref 65–100)
GLUCOSE BLD STRIP.AUTO-MCNC: 327 MG/DL (ref 65–100)
GLUCOSE BLD STRIP.AUTO-MCNC: 330 MG/DL (ref 65–100)
GLUCOSE BLD STRIP.AUTO-MCNC: 339 MG/DL (ref 65–100)
GLUCOSE BLD STRIP.AUTO-MCNC: 340 MG/DL (ref 65–100)
GLUCOSE BLD STRIP.AUTO-MCNC: 346 MG/DL (ref 65–100)
GLUCOSE BLD STRIP.AUTO-MCNC: 359 MG/DL (ref 65–100)
GLUCOSE BLD STRIP.AUTO-MCNC: 382 MG/DL (ref 65–100)
GLUCOSE BLD STRIP.AUTO-MCNC: 405 MG/DL (ref 65–100)
GLUCOSE BLD STRIP.AUTO-MCNC: 414 MG/DL (ref 65–100)
GLUCOSE BLD STRIP.AUTO-MCNC: 429 MG/DL (ref 65–100)
GLUCOSE BLD STRIP.AUTO-MCNC: 452 MG/DL (ref 65–100)
GLUCOSE BLD STRIP.AUTO-MCNC: 453 MG/DL (ref 65–100)
GLUCOSE BLD STRIP.AUTO-MCNC: 459 MG/DL (ref 65–100)
GLUCOSE BLD STRIP.AUTO-MCNC: 504 MG/DL (ref 65–100)
GLUCOSE BLD STRIP.AUTO-MCNC: 519 MG/DL (ref 65–100)
GLUCOSE BLD STRIP.AUTO-MCNC: 532 MG/DL (ref 65–100)
GLUCOSE BLD STRIP.AUTO-MCNC: 539 MG/DL (ref 65–100)
GLUCOSE BLD STRIP.AUTO-MCNC: 551 MG/DL (ref 65–100)
GLUCOSE BLD STRIP.AUTO-MCNC: 554 MG/DL (ref 65–100)
GLUCOSE BLD STRIP.AUTO-MCNC: 596 MG/DL (ref 65–100)
GLUCOSE BLD STRIP.AUTO-MCNC: 69 MG/DL (ref 65–100)
GLUCOSE BLD STRIP.AUTO-MCNC: 69 MG/DL (ref 65–100)
GLUCOSE BLD STRIP.AUTO-MCNC: 90 MG/DL (ref 65–100)
GLUCOSE BLD STRIP.AUTO-MCNC: 92 MG/DL (ref 65–100)
GLUCOSE BLD STRIP.AUTO-MCNC: 92 MG/DL (ref 65–100)
GLUCOSE BLD STRIP.AUTO-MCNC: 97 MG/DL (ref 65–100)
GLUCOSE BLD STRIP.AUTO-MCNC: >600 MG/DL (ref 65–100)
GLUCOSE BLD-MCNC: >700 MG/DL (ref 65–105)
GLUCOSE SERPL-MCNC: 105 MG/DL (ref 65–100)
GLUCOSE SERPL-MCNC: 1050 MG/DL (ref 65–100)
GLUCOSE SERPL-MCNC: 109 MG/DL (ref 65–100)
GLUCOSE SERPL-MCNC: 111 MG/DL (ref 65–100)
GLUCOSE SERPL-MCNC: 121 MG/DL (ref 65–100)
GLUCOSE SERPL-MCNC: 121 MG/DL (ref 65–100)
GLUCOSE SERPL-MCNC: 122 MG/DL (ref 65–100)
GLUCOSE SERPL-MCNC: 1266 MG/DL (ref 65–100)
GLUCOSE SERPL-MCNC: 133 MG/DL (ref 65–100)
GLUCOSE SERPL-MCNC: 137 MG/DL (ref 65–100)
GLUCOSE SERPL-MCNC: 158 MG/DL (ref 65–100)
GLUCOSE SERPL-MCNC: 163 MG/DL (ref 65–100)
GLUCOSE SERPL-MCNC: 164 MG/DL (ref 65–100)
GLUCOSE SERPL-MCNC: 167 MG/DL (ref 65–100)
GLUCOSE SERPL-MCNC: 176 MG/DL (ref 65–100)
GLUCOSE SERPL-MCNC: 182 MG/DL (ref 65–100)
GLUCOSE SERPL-MCNC: 187 MG/DL (ref 65–100)
GLUCOSE SERPL-MCNC: 190 MG/DL (ref 65–100)
GLUCOSE SERPL-MCNC: 193 MG/DL (ref 65–100)
GLUCOSE SERPL-MCNC: 201 MG/DL (ref 65–100)
GLUCOSE SERPL-MCNC: 202 MG/DL (ref 65–100)
GLUCOSE SERPL-MCNC: 204 MG/DL (ref 65–100)
GLUCOSE SERPL-MCNC: 206 MG/DL (ref 65–100)
GLUCOSE SERPL-MCNC: 208 MG/DL (ref 65–100)
GLUCOSE SERPL-MCNC: 209 MG/DL (ref 65–100)
GLUCOSE SERPL-MCNC: 232 MG/DL (ref 65–100)
GLUCOSE SERPL-MCNC: 245 MG/DL (ref 65–100)
GLUCOSE SERPL-MCNC: 258 MG/DL (ref 65–100)
GLUCOSE SERPL-MCNC: 262 MG/DL (ref 65–100)
GLUCOSE SERPL-MCNC: 278 MG/DL (ref 65–100)
GLUCOSE SERPL-MCNC: 291 MG/DL (ref 65–100)
GLUCOSE SERPL-MCNC: 310 MG/DL (ref 65–100)
GLUCOSE SERPL-MCNC: 349 MG/DL (ref 65–100)
GLUCOSE SERPL-MCNC: 395 MG/DL (ref 65–100)
GLUCOSE SERPL-MCNC: 395 MG/DL (ref 65–100)
GLUCOSE SERPL-MCNC: 635 MG/DL (ref 65–100)
GLUCOSE SERPL-MCNC: 676 MG/DL (ref 65–100)
GLUCOSE SERPL-MCNC: 721 MG/DL (ref 65–100)
GLUCOSE SERPL-MCNC: 763 MG/DL (ref 65–100)
GLUCOSE SERPL-MCNC: 77 MG/DL (ref 65–100)
GLUCOSE SERPL-MCNC: 775 MG/DL (ref 65–100)
GLUCOSE SERPL-MCNC: 849 MG/DL (ref 65–100)
GLUCOSE SERPL-MCNC: 953 MG/DL (ref 65–100)
GLUCOSE SERPL-MCNC: 964 MG/DL (ref 65–100)
GLUCOSE UR STRIP.AUTO-MCNC: >1000 MG/DL
GLUCOSE, GLC: 135 MG/DL
GLUCOSE, GLC: 144 MG/DL
GLUCOSE, GLC: 148 MG/DL
GLUCOSE, GLC: 148 MG/DL
GLUCOSE, GLC: 151 MG/DL
GLUCOSE, GLC: 158 MG/DL
GLUCOSE, GLC: 179 MG/DL
GLUCOSE, GLC: 189 MG/DL
GLUCOSE, GLC: 197 MG/DL
GLUCOSE, GLC: 201 MG/DL
GLUCOSE, GLC: 202 MG/DL
GLUCOSE, GLC: 211 MG/DL
GLUCOSE, GLC: 213 MG/DL
GLUCOSE, GLC: 241 MG/DL
GLUCOSE, GLC: 339 MG/DL
GLUCOSE, GLC: 519 MG/DL
GLUCOSE, GLC: 601 MG/DL
HAV IGM SERPL QL IA: NONREACTIVE
HBA1C MFR BLD: 13.5 % (ref 4.2–6.3)
HBA1C MFR BLD: 13.9 % (ref 4.2–6.3)
HBA1C MFR BLD: 14 % (ref 4.2–6.3)
HBA1C MFR BLD: 14.1 % (ref 4.2–6.3)
HBA1C MFR BLD: 14.2 % (ref 4.2–6.3)
HBA1C MFR BLD: 14.2 % (ref 4.2–6.3)
HBA1C MFR BLD: 14.4 % (ref 4.2–6.3)
HBA1C MFR BLD: 14.6 % (ref 4.2–6.3)
HBA1C MFR BLD: 14.6 % (ref 4.2–6.3)
HBA1C MFR BLD: 14.9 % (ref 4.2–6.3)
HBV CORE IGM SER QL: NONREACTIVE
HBV SURFACE AG SER QL: 0.15 INDEX
HBV SURFACE AG SER QL: 0.28 INDEX
HBV SURFACE AG SER QL: NEGATIVE
HBV SURFACE AG SER QL: NEGATIVE
HCG UR QL: NEGATIVE
HCO3 BLD-SCNC: 10.3 MMOL/L (ref 22–26)
HCO3 BLD-SCNC: 17.4 MMOL/L (ref 22–26)
HCO3 BLD-SCNC: 4.3 MMOL/L (ref 22–26)
HCO3 BLDA-SCNC: 2 MMOL/L (ref 22–26)
HCO3 BLDA-SCNC: 25 MMOL/L (ref 22–26)
HCO3 BLDA-SCNC: 25 MMOL/L (ref 22–26)
HCO3 BLDA-SCNC: 27 MMOL/L (ref 22–26)
HCO3 BLDA-SCNC: 3 MMOL/L (ref 22–26)
HCO3 BLDA-SCNC: 4 MMOL/L (ref 22–26)
HCO3 BLDV-SCNC: 2 MMOL/L (ref 23–28)
HCO3 BLDV-SCNC: 3 MMOL/L (ref 23–28)
HCO3 BLDV-SCNC: 3.1 MMOL/L (ref 23–28)
HCT VFR BLD AUTO: 24.7 % (ref 35–47)
HCT VFR BLD AUTO: 26.9 % (ref 35–47)
HCT VFR BLD AUTO: 27 % (ref 35–47)
HCT VFR BLD AUTO: 27.3 % (ref 35–47)
HCT VFR BLD AUTO: 28.2 % (ref 35–47)
HCT VFR BLD AUTO: 29.2 % (ref 35–47)
HCT VFR BLD AUTO: 29.5 % (ref 35–47)
HCT VFR BLD AUTO: 31.3 % (ref 35–47)
HCT VFR BLD AUTO: 32 % (ref 35–47)
HCT VFR BLD AUTO: 32.3 % (ref 35–47)
HCT VFR BLD AUTO: 33.4 % (ref 35–47)
HCT VFR BLD AUTO: 33.6 % (ref 35–47)
HCT VFR BLD AUTO: 34.8 % (ref 35–47)
HCT VFR BLD AUTO: 40.1 % (ref 35–47)
HCT VFR BLD AUTO: 41.3 % (ref 35–47)
HCT VFR BLD AUTO: 43.3 % (ref 35–47)
HCT VFR BLD AUTO: 46.4 % (ref 35–47)
HCT VFR BLD AUTO: 47.5 % (ref 35–47)
HCT VFR BLD AUTO: 48.2 % (ref 35–47)
HCT VFR BLD CALC: 49 % (ref 35–47)
HCV AB SERPL QL IA: NONREACTIVE
HCV AB SERPL QL IA: NONREACTIVE
HCV COMMENT,HCGAC: NORMAL
HCV COMMENT,HCGAC: NORMAL
HDLC SERPL-MCNC: 48 MG/DL
HDLC SERPL-MCNC: 50 MG/DL
HDLC SERPL-MCNC: 68 MG/DL
HDLC SERPL: 4.8 {RATIO} (ref 0–5)
HDLC SERPL: 6.5 {RATIO} (ref 0–5)
HDLC SERPL: 6.5 {RATIO} (ref 0–5)
HGB BLD-MCNC: 10 G/DL (ref 11.5–16)
HGB BLD-MCNC: 10.1 G/DL (ref 11.5–16)
HGB BLD-MCNC: 10.8 G/DL (ref 11.5–16)
HGB BLD-MCNC: 11 G/DL (ref 11.5–16)
HGB BLD-MCNC: 11.2 G/DL (ref 11.5–16)
HGB BLD-MCNC: 11.5 G/DL (ref 11.5–16)
HGB BLD-MCNC: 11.6 G/DL (ref 11.5–16)
HGB BLD-MCNC: 11.9 G/DL (ref 11.5–16)
HGB BLD-MCNC: 13.1 G/DL (ref 11.5–16)
HGB BLD-MCNC: 13.2 G/DL (ref 11.5–16)
HGB BLD-MCNC: 13.4 G/DL (ref 11.5–16)
HGB BLD-MCNC: 14 G/DL (ref 11.5–16)
HGB BLD-MCNC: 14.5 G/DL (ref 11.5–16)
HGB BLD-MCNC: 15.9 G/DL (ref 11.5–16)
HGB BLD-MCNC: 16.7 GM/DL (ref 11.5–16)
HGB BLD-MCNC: 8.7 G/DL (ref 11.5–16)
HGB BLD-MCNC: 9.1 G/DL (ref 11.5–16)
HGB BLD-MCNC: 9.6 G/DL (ref 11.5–16)
HGB BLD-MCNC: 9.9 G/DL (ref 11.5–16)
HGB BLD-MCNC: 9.9 G/DL (ref 11.5–16)
HGB UR QL STRIP: ABNORMAL
HGB UR QL STRIP: NEGATIVE
HIGH TARGET, HITG: 250 MG/DL
HIV 2 ANTIBODY,HIV2: NEGATIVE
HIV1 P24 AG SERPL QL IA: NONREACTIVE
HIV1 RNA # SERPL NAA+PROBE: <20 COPIES/ML
HIV1 RNA SERPL NAA+PROBE-LOG#: NORMAL LOG10COPY/ML
HIV1+2 AB SERPL QL IA: NONREACTIVE
HYALINE CASTS URNS QL MICRO: ABNORMAL /LPF (ref 0–5)
INSULIN ADMINSTERED, INSADM: 0.9 UNITS/HOUR
INSULIN ADMINSTERED, INSADM: 1 UNITS/HOUR
INSULIN ADMINSTERED, INSADM: 1.4 UNITS/HOUR
INSULIN ADMINSTERED, INSADM: 1.5 UNITS/HOUR
INSULIN ADMINSTERED, INSADM: 1.5 UNITS/HOUR
INSULIN ADMINSTERED, INSADM: 10.8 UNITS/HOUR
INSULIN ADMINSTERED, INSADM: 14 UNITS/HOUR
INSULIN ADMINSTERED, INSADM: 16.2 UNITS/HOUR
INSULIN ADMINSTERED, INSADM: 2.5 UNITS/HOUR
INSULIN ADMINSTERED, INSADM: 2.7 UNITS/HOUR
INSULIN ADMINSTERED, INSADM: 21.6 UNITS/HOUR
INSULIN ADMINSTERED, INSADM: 23 UNITS/HOUR
INSULIN ADMINSTERED, INSADM: 3.5 UNITS/HOUR
INSULIN ADMINSTERED, INSADM: 4.8 UNITS/HOUR
INSULIN ADMINSTERED, INSADM: 5.2 UNITS/HOUR
INSULIN ADMINSTERED, INSADM: 5.6 UNITS/HOUR
INSULIN ADMINSTERED, INSADM: 5.7 UNITS/HOUR
INSULIN ADMINSTERED, INSADM: 6 UNITS/HOUR
INSULIN ADMINSTERED, INSADM: 9.1 UNITS/HOUR
INSULIN ORDER, INSORD: 0.9 UNITS/HOUR
INSULIN ORDER, INSORD: 1 UNITS/HOUR
INSULIN ORDER, INSORD: 1.4 UNITS/HOUR
INSULIN ORDER, INSORD: 1.5 UNITS/HOUR
INSULIN ORDER, INSORD: 1.5 UNITS/HOUR
INSULIN ORDER, INSORD: 10.8 UNITS/HOUR
INSULIN ORDER, INSORD: 14 UNITS/HOUR
INSULIN ORDER, INSORD: 16.2 UNITS/HOUR
INSULIN ORDER, INSORD: 2.5 UNITS/HOUR
INSULIN ORDER, INSORD: 2.7 UNITS/HOUR
INSULIN ORDER, INSORD: 21.6 UNITS/HOUR
INSULIN ORDER, INSORD: 23 UNITS/HOUR
INSULIN ORDER, INSORD: 3.5 UNITS/HOUR
INSULIN ORDER, INSORD: 4.8 UNITS/HOUR
INSULIN ORDER, INSORD: 5.2 UNITS/HOUR
INSULIN ORDER, INSORD: 5.6 UNITS/HOUR
INSULIN ORDER, INSORD: 5.7 UNITS/HOUR
INSULIN ORDER, INSORD: 6 UNITS/HOUR
INSULIN ORDER, INSORD: 9.1 UNITS/HOUR
KETONES SERPL QL: POSITIVE
KETONES UR QL STRIP.AUTO: 80 MG/DL
KETONES UR QL STRIP.AUTO: >80 MG/DL
LACTATE SERPL-SCNC: 1.7 MMOL/L (ref 0.4–2)
LACTATE SERPL-SCNC: 1.7 MMOL/L (ref 0.4–2)
LACTATE SERPL-SCNC: 1.8 MMOL/L (ref 0.4–2)
LACTATE SERPL-SCNC: 2.3 MMOL/L (ref 0.4–2)
LACTATE SERPL-SCNC: 2.5 MMOL/L (ref 0.4–2)
LACTATE SERPL-SCNC: 2.7 MMOL/L (ref 0.4–2)
LACTATE SERPL-SCNC: 2.8 MMOL/L (ref 0.4–2)
LACTATE SERPL-SCNC: 3.5 MMOL/L (ref 0.4–2)
LACTATE SERPL-SCNC: 3.6 MMOL/L (ref 0.4–2)
LACTATE SERPL-SCNC: 3.6 MMOL/L (ref 0.4–2)
LACTATE SERPL-SCNC: 4.1 MMOL/L (ref 0.4–2)
LACTATE SERPL-SCNC: 4.3 MMOL/L (ref 0.4–2)
LACTATE SERPL-SCNC: 5.1 MMOL/L (ref 0.4–2)
LACTATE SERPL-SCNC: 5.1 MMOL/L (ref 0.4–2)
LACTATE SERPL-SCNC: 5.4 MMOL/L (ref 0.4–2)
LACTATE SERPL-SCNC: 6.9 MMOL/L (ref 0.4–2)
LACTATE SERPL-SCNC: 8 MMOL/L (ref 0.4–2)
LAMOTRIGINE SERPL-MCNC: 1.1 UG/ML (ref 2–20)
LAMOTRIGINE SERPL-MCNC: 1.5 UG/ML (ref 2–20)
LDLC SERPL CALC-MCNC: 197 MG/DL (ref 0–100)
LDLC SERPL CALC-MCNC: 218.4 MG/DL (ref 0–100)
LDLC SERPL CALC-MCNC: ABNORMAL MG/DL (ref 0–100)
LDLC SERPL DIRECT ASSAY-MCNC: 181 MG/DL (ref 0–100)
LEUKOCYTE ESTERASE UR QL STRIP.AUTO: NEGATIVE
LIPASE SERPL-CCNC: 178 U/L (ref 73–393)
LIPID PROFILE,FLP: ABNORMAL
LOW TARGET, LOT: 150 MG/DL
LYMPHOCYTES # BLD AUTO: 11 % (ref 12–49)
LYMPHOCYTES # BLD AUTO: 17 % (ref 12–49)
LYMPHOCYTES # BLD AUTO: 19 % (ref 12–49)
LYMPHOCYTES # BLD AUTO: 20 % (ref 12–49)
LYMPHOCYTES # BLD AUTO: 25 %
LYMPHOCYTES # BLD AUTO: 26 %
LYMPHOCYTES # BLD AUTO: 27 %
LYMPHOCYTES # BLD AUTO: 33 % (ref 12–49)
LYMPHOCYTES # BLD AUTO: 34 % (ref 12–49)
LYMPHOCYTES # BLD AUTO: 35 % (ref 12–49)
LYMPHOCYTES # BLD AUTO: 36 % (ref 12–49)
LYMPHOCYTES # BLD AUTO: 50 % (ref 12–49)
LYMPHOCYTES # BLD: 1.2 K/UL
LYMPHOCYTES # BLD: 1.5 K/UL (ref 0.8–3.5)
LYMPHOCYTES # BLD: 1.6 K/UL (ref 0.8–3.5)
LYMPHOCYTES # BLD: 2.2 K/UL (ref 0.8–3.5)
LYMPHOCYTES # BLD: 2.3 K/UL (ref 0.8–3.5)
LYMPHOCYTES # BLD: 2.8 K/UL (ref 0.8–3.5)
LYMPHOCYTES # BLD: 5.7 K/UL (ref 0.8–3.5)
LYMPHOCYTES # BLD: 7.8 K/UL
LYMPHOCYTES # BLD: 9.4 K/UL (ref 0.8–3.5)
LYMPHOCYTES # BLD: 9.5 K/UL
MAGNESIUM SERPL-MCNC: 1.3 MG/DL (ref 1.6–2.4)
MAGNESIUM SERPL-MCNC: 1.4 MG/DL (ref 1.6–2.4)
MAGNESIUM SERPL-MCNC: 1.5 MG/DL (ref 1.6–2.4)
MAGNESIUM SERPL-MCNC: 1.6 MG/DL (ref 1.6–2.4)
MAGNESIUM SERPL-MCNC: 1.7 MG/DL (ref 1.6–2.4)
MAGNESIUM SERPL-MCNC: 1.8 MG/DL (ref 1.6–2.4)
MAGNESIUM SERPL-MCNC: 1.9 MG/DL (ref 1.6–2.4)
MAGNESIUM SERPL-MCNC: 2 MG/DL (ref 1.6–2.4)
MAGNESIUM SERPL-MCNC: 2.1 MG/DL (ref 1.6–2.4)
MAGNESIUM SERPL-MCNC: 2.1 MG/DL (ref 1.6–2.4)
MAGNESIUM SERPL-MCNC: 2.2 MG/DL (ref 1.6–2.4)
MAGNESIUM SERPL-MCNC: 2.2 MG/DL (ref 1.6–2.4)
MAGNESIUM SERPL-MCNC: 2.7 MG/DL (ref 1.6–2.4)
MAGNESIUM SERPL-MCNC: 3.1 MG/DL (ref 1.6–2.4)
MCH RBC QN AUTO: 30.5 PG (ref 26–34)
MCH RBC QN AUTO: 30.6 PG (ref 26–34)
MCH RBC QN AUTO: 30.7 PG (ref 26–34)
MCH RBC QN AUTO: 30.8 PG (ref 26–34)
MCH RBC QN AUTO: 30.9 PG (ref 26–34)
MCH RBC QN AUTO: 31 PG (ref 26–34)
MCH RBC QN AUTO: 31 PG (ref 26–34)
MCH RBC QN AUTO: 31.1 PG (ref 26–34)
MCH RBC QN AUTO: 31.2 PG (ref 26–34)
MCH RBC QN AUTO: 31.4 PG (ref 26–34)
MCH RBC QN AUTO: 31.5 PG (ref 26–34)
MCH RBC QN AUTO: 31.5 PG (ref 26–34)
MCH RBC QN AUTO: 31.6 PG (ref 26–34)
MCH RBC QN AUTO: 31.8 PG (ref 26–34)
MCH RBC QN AUTO: 31.8 PG (ref 26–34)
MCH RBC QN AUTO: 32 PG (ref 26–34)
MCH RBC QN AUTO: 32.3 PG (ref 26–34)
MCHC RBC AUTO-ENTMCNC: 29 G/DL (ref 30–36.5)
MCHC RBC AUTO-ENTMCNC: 30.3 G/DL (ref 30–36.5)
MCHC RBC AUTO-ENTMCNC: 30.5 G/DL (ref 30–36.5)
MCHC RBC AUTO-ENTMCNC: 32.4 G/DL (ref 30–36.5)
MCHC RBC AUTO-ENTMCNC: 32.9 G/DL (ref 30–36.5)
MCHC RBC AUTO-ENTMCNC: 33.8 G/DL (ref 30–36.5)
MCHC RBC AUTO-ENTMCNC: 33.9 G/DL (ref 30–36.5)
MCHC RBC AUTO-ENTMCNC: 34.2 G/DL (ref 30–36.5)
MCHC RBC AUTO-ENTMCNC: 34.3 G/DL (ref 30–36.5)
MCHC RBC AUTO-ENTMCNC: 34.4 G/DL (ref 30–36.5)
MCHC RBC AUTO-ENTMCNC: 34.4 G/DL (ref 30–36.5)
MCHC RBC AUTO-ENTMCNC: 34.5 G/DL (ref 30–36.5)
MCHC RBC AUTO-ENTMCNC: 34.5 G/DL (ref 30–36.5)
MCHC RBC AUTO-ENTMCNC: 34.6 G/DL (ref 30–36.5)
MCHC RBC AUTO-ENTMCNC: 34.7 G/DL (ref 30–36.5)
MCHC RBC AUTO-ENTMCNC: 35.1 G/DL (ref 30–36.5)
MCHC RBC AUTO-ENTMCNC: 35.2 G/DL (ref 30–36.5)
MCHC RBC AUTO-ENTMCNC: 35.6 G/DL (ref 30–36.5)
MCHC RBC AUTO-ENTMCNC: 36.3 G/DL (ref 30–36.5)
MCV RBC AUTO: 101.9 FL (ref 80–99)
MCV RBC AUTO: 105.1 FL (ref 80–99)
MCV RBC AUTO: 108.3 FL (ref 80–99)
MCV RBC AUTO: 85.6 FL (ref 80–99)
MCV RBC AUTO: 87.6 FL (ref 80–99)
MCV RBC AUTO: 87.9 FL (ref 80–99)
MCV RBC AUTO: 89.1 FL (ref 80–99)
MCV RBC AUTO: 89.2 FL (ref 80–99)
MCV RBC AUTO: 90.1 FL (ref 80–99)
MCV RBC AUTO: 90.3 FL (ref 80–99)
MCV RBC AUTO: 90.8 FL (ref 80–99)
MCV RBC AUTO: 90.9 FL (ref 80–99)
MCV RBC AUTO: 91.3 FL (ref 80–99)
MCV RBC AUTO: 91.3 FL (ref 80–99)
MCV RBC AUTO: 91.6 FL (ref 80–99)
MCV RBC AUTO: 91.8 FL (ref 80–99)
MCV RBC AUTO: 92.4 FL (ref 80–99)
MCV RBC AUTO: 93.7 FL (ref 80–99)
MCV RBC AUTO: 96.7 FL (ref 80–99)
METAMYELOCYTES NFR BLD MANUAL: 1 %
METAMYELOCYTES NFR BLD MANUAL: 2 %
METAMYELOCYTES NFR BLD MANUAL: 3 %
METAMYELOCYTES NFR BLD MANUAL: 6 %
METHADONE UR QL: NEGATIVE
MINUTES UNTIL NEXT BG, NBG: 60 MIN
MONOCYTES # BLD: 0.2 K/UL
MONOCYTES # BLD: 0.2 K/UL (ref 0–1)
MONOCYTES # BLD: 0.3 K/UL (ref 0–1)
MONOCYTES # BLD: 0.4 K/UL (ref 0–1)
MONOCYTES # BLD: 0.6 K/UL (ref 0–1)
MONOCYTES # BLD: 0.8 K/UL
MONOCYTES # BLD: 0.8 K/UL (ref 0–1)
MONOCYTES # BLD: 0.9 K/UL (ref 0–1)
MONOCYTES # BLD: 1.2 K/UL
MONOCYTES # BLD: 2.4 K/UL (ref 0–1)
MONOCYTES NFR BLD AUTO: 2 %
MONOCYTES NFR BLD AUTO: 3 % (ref 5–13)
MONOCYTES NFR BLD AUTO: 4 %
MONOCYTES NFR BLD AUTO: 4 %
MONOCYTES NFR BLD AUTO: 4 % (ref 5–13)
MONOCYTES NFR BLD AUTO: 6 % (ref 5–13)
MONOCYTES NFR BLD AUTO: 7 % (ref 5–13)
MONOCYTES NFR BLD AUTO: 8 % (ref 5–13)
MUCOUS THREADS URNS QL MICRO: ABNORMAL /LPF
MULTIPLIER, MUL: 0.01
MULTIPLIER, MUL: 0.02
MULTIPLIER, MUL: 0.02
MULTIPLIER, MUL: 0.03
MULTIPLIER, MUL: 0.04
MULTIPLIER, MUL: 0.05
MYELOCYTES NFR BLD MANUAL: 2 %
MYELOCYTES NFR BLD MANUAL: 3 %
MYELOCYTES NFR BLD MANUAL: 5 %
MYELOCYTES NFR BLD MANUAL: 5 %
NEUTS BAND NFR BLD MANUAL: 12 % (ref 0–6)
NEUTS BAND NFR BLD MANUAL: 2 %
NEUTS BAND NFR BLD MANUAL: 4 % (ref 0–6)
NEUTS BAND NFR BLD MANUAL: 5 %
NEUTS BAND NFR BLD MANUAL: 5 % (ref 0–6)
NEUTS SEG # BLD: 15.1 K/UL (ref 1.8–8)
NEUTS SEG # BLD: 16.9 K/UL (ref 1.8–8)
NEUTS SEG # BLD: 18.7 K/UL
NEUTS SEG # BLD: 19.1 K/UL (ref 1.8–8)
NEUTS SEG # BLD: 2 K/UL (ref 1.8–8)
NEUTS SEG # BLD: 2.5 K/UL (ref 1.8–8)
NEUTS SEG # BLD: 2.9 K/UL
NEUTS SEG # BLD: 27.6 K/UL
NEUTS SEG # BLD: 3.6 K/UL (ref 1.8–8)
NEUTS SEG # BLD: 4.9 K/UL (ref 1.8–8)
NEUTS SEG # BLD: 6.8 K/UL (ref 1.8–8)
NEUTS SEG # BLD: 9 K/UL (ref 1.8–8)
NEUTS SEG NFR BLD AUTO: 45 % (ref 32–75)
NEUTS SEG NFR BLD AUTO: 49 % (ref 32–75)
NEUTS SEG NFR BLD AUTO: 52 % (ref 32–75)
NEUTS SEG NFR BLD AUTO: 56 % (ref 32–75)
NEUTS SEG NFR BLD AUTO: 59 % (ref 32–75)
NEUTS SEG NFR BLD AUTO: 62 % (ref 32–75)
NEUTS SEG NFR BLD AUTO: 63 %
NEUTS SEG NFR BLD AUTO: 63 %
NEUTS SEG NFR BLD AUTO: 68 %
NEUTS SEG NFR BLD AUTO: 76 % (ref 32–75)
NEUTS SEG NFR BLD AUTO: 77 % (ref 32–75)
NEUTS SEG NFR BLD AUTO: 80 % (ref 32–75)
NITRITE UR QL STRIP.AUTO: NEGATIVE
NRBC # BLD: 0 K/UL (ref 0–0.01)
NRBC # BLD: 0 K/UL (ref 0–0.01)
NRBC BLD-RTO: 0 PER 100 WBC
NRBC BLD-RTO: 0 PER 100 WBC
O2/TOTAL GAS SETTING VFR VENT: 0.21 %
O2/TOTAL GAS SETTING VFR VENT: 21 %
OPIATES UR QL: NEGATIVE
ORDER INITIALS, ORDINIT: NORMAL
P-R INTERVAL, ECG05: 134 MS
P-R INTERVAL, ECG05: 150 MS
P-R INTERVAL, ECG05: 156 MS
PATH REV BLD -IMP: ABNORMAL
PATH REV BLD -IMP: NORMAL
PCO2 BLD: 16.3 MMHG (ref 35–45)
PCO2 BLD: 24.6 MMHG (ref 35–45)
PCO2 BLD: 36.8 MMHG (ref 35–45)
PCO2 BLDA: 12 MMHG (ref 35–45)
PCO2 BLDA: 16 MMHG (ref 35–45)
PCO2 BLDA: 17 MMHG (ref 35–45)
PCO2 BLDA: 38 MMHG (ref 35–45)
PCO2 BLDA: 39 MMHG (ref 35–45)
PCO2 BLDA: 49 MMHG (ref 35–45)
PCO2 BLDV: 18 MMHG (ref 41–51)
PCO2 BLDV: 18 MMHG (ref 41–51)
PCO2 BLDV: 19.4 MMHG (ref 41–51)
PCO2 BLDV: <16 MMHG (ref 41–51)
PCP UR QL: NEGATIVE
PH BLD: 7.03 [PH] (ref 7.35–7.45)
PH BLD: 7.23 [PH] (ref 7.35–7.45)
PH BLD: 7.28 [PH] (ref 7.35–7.45)
PH BLDA: 6.79 [PH] (ref 7.35–7.45)
PH BLDA: 6.92 [PH] (ref 7.35–7.45)
PH BLDA: 6.98 [PH] (ref 7.35–7.45)
PH BLDA: 7.37 [PH] (ref 7.35–7.45)
PH BLDA: 7.43 [PH] (ref 7.35–7.45)
PH BLDA: 7.45 [PH] (ref 7.35–7.45)
PH BLDV: 6.67 [PH] (ref 7.32–7.42)
PH BLDV: 6.82 [PH] (ref 7.32–7.42)
PH BLDV: 6.82 [PH] (ref 7.32–7.42)
PH BLDV: 6.88 [PH] (ref 7.32–7.42)
PH UR STRIP: 5 [PH] (ref 5–8)
PH UR STRIP: 5.5 [PH] (ref 5–8)
PHOSPHATE SERPL-MCNC: 1.2 MG/DL (ref 2.6–4.7)
PHOSPHATE SERPL-MCNC: 1.5 MG/DL (ref 2.6–4.7)
PHOSPHATE SERPL-MCNC: 1.6 MG/DL (ref 2.6–4.7)
PHOSPHATE SERPL-MCNC: 1.6 MG/DL (ref 2.6–4.7)
PHOSPHATE SERPL-MCNC: 1.8 MG/DL (ref 2.6–4.7)
PHOSPHATE SERPL-MCNC: 1.9 MG/DL (ref 2.6–4.7)
PHOSPHATE SERPL-MCNC: 10.6 MG/DL (ref 2.6–4.7)
PHOSPHATE SERPL-MCNC: 2.2 MG/DL (ref 2.6–4.7)
PHOSPHATE SERPL-MCNC: 2.5 MG/DL (ref 2.6–4.7)
PHOSPHATE SERPL-MCNC: 2.5 MG/DL (ref 2.6–4.7)
PHOSPHATE SERPL-MCNC: 2.8 MG/DL (ref 2.6–4.7)
PHOSPHATE SERPL-MCNC: 2.8 MG/DL (ref 2.6–4.7)
PHOSPHATE SERPL-MCNC: 9.3 MG/DL (ref 2.6–4.7)
PLATELET # BLD AUTO: 100 K/UL (ref 150–400)
PLATELET # BLD AUTO: 106 K/UL (ref 150–400)
PLATELET # BLD AUTO: 132 K/UL (ref 150–400)
PLATELET # BLD AUTO: 134 K/UL (ref 150–400)
PLATELET # BLD AUTO: 145 K/UL (ref 150–400)
PLATELET # BLD AUTO: 147 K/UL (ref 150–400)
PLATELET # BLD AUTO: 157 K/UL (ref 150–400)
PLATELET # BLD AUTO: 181 K/UL (ref 150–400)
PLATELET # BLD AUTO: 193 K/UL (ref 150–400)
PLATELET # BLD AUTO: 226 K/UL (ref 150–400)
PLATELET # BLD AUTO: 230 K/UL (ref 150–400)
PLATELET # BLD AUTO: 230 K/UL (ref 150–400)
PLATELET # BLD AUTO: 263 K/UL (ref 150–400)
PLATELET # BLD AUTO: 302 K/UL (ref 150–400)
PLATELET # BLD AUTO: 307 K/UL (ref 150–400)
PLATELET # BLD AUTO: 354 K/UL (ref 150–400)
PLATELET # BLD AUTO: 392 K/UL (ref 150–400)
PLATELET # BLD AUTO: 436 K/UL (ref 150–400)
PLATELET # BLD AUTO: 455 K/UL (ref 150–400)
PO2 BLD: 115 MMHG (ref 80–100)
PO2 BLD: 84 MMHG (ref 80–100)
PO2 BLD: 91 MMHG (ref 80–100)
PO2 BLDA: 134 MMHG (ref 80–100)
PO2 BLDA: 139 MMHG (ref 80–100)
PO2 BLDA: 160 MMHG (ref 80–100)
PO2 BLDA: 180 MMHG (ref 80–100)
PO2 BLDA: 188 MMHG (ref 80–100)
PO2 BLDA: 87 MMHG (ref 80–100)
PO2 BLDV: 101 MMHG (ref 25–40)
PO2 BLDV: 66 MMHG (ref 25–40)
PO2 BLDV: 77 MMHG (ref 25–40)
PO2 BLDV: 77 MMHG (ref 25–40)
POTASSIUM BLD-SCNC: 5.5 MMOL/L (ref 3.5–5.1)
POTASSIUM SERPL-SCNC: 2.2 MMOL/L (ref 3.5–5.1)
POTASSIUM SERPL-SCNC: 2.6 MMOL/L (ref 3.5–5.1)
POTASSIUM SERPL-SCNC: 2.8 MMOL/L (ref 3.5–5.1)
POTASSIUM SERPL-SCNC: 2.8 MMOL/L (ref 3.5–5.1)
POTASSIUM SERPL-SCNC: 2.9 MMOL/L (ref 3.5–5.1)
POTASSIUM SERPL-SCNC: 2.9 MMOL/L (ref 3.5–5.1)
POTASSIUM SERPL-SCNC: 3 MMOL/L (ref 3.5–5.1)
POTASSIUM SERPL-SCNC: 3.1 MMOL/L (ref 3.5–5.1)
POTASSIUM SERPL-SCNC: 3.2 MMOL/L (ref 3.5–5.1)
POTASSIUM SERPL-SCNC: 3.3 MMOL/L (ref 3.5–5.1)
POTASSIUM SERPL-SCNC: 3.4 MMOL/L (ref 3.5–5.1)
POTASSIUM SERPL-SCNC: 3.5 MMOL/L (ref 3.5–5.1)
POTASSIUM SERPL-SCNC: 3.5 MMOL/L (ref 3.5–5.1)
POTASSIUM SERPL-SCNC: 3.6 MMOL/L (ref 3.5–5.1)
POTASSIUM SERPL-SCNC: 3.7 MMOL/L (ref 3.5–5.1)
POTASSIUM SERPL-SCNC: 3.8 MMOL/L (ref 3.5–5.1)
POTASSIUM SERPL-SCNC: 3.8 MMOL/L (ref 3.5–5.1)
POTASSIUM SERPL-SCNC: 4 MMOL/L (ref 3.5–5.1)
POTASSIUM SERPL-SCNC: 4.1 MMOL/L (ref 3.5–5.1)
POTASSIUM SERPL-SCNC: 4.4 MMOL/L (ref 3.5–5.1)
POTASSIUM SERPL-SCNC: 4.5 MMOL/L (ref 3.5–5.1)
POTASSIUM SERPL-SCNC: 4.8 MMOL/L (ref 3.5–5.1)
POTASSIUM SERPL-SCNC: 5.4 MMOL/L (ref 3.5–5.1)
POTASSIUM SERPL-SCNC: 5.4 MMOL/L (ref 3.5–5.1)
PRESSURE SUPPORT SETTING VENT: 5 CM[H2O]
PROT SERPL-MCNC: 5.2 G/DL (ref 6.4–8.2)
PROT SERPL-MCNC: 5.3 G/DL (ref 6.4–8.2)
PROT SERPL-MCNC: 5.5 G/DL (ref 6.4–8.2)
PROT SERPL-MCNC: 5.6 G/DL (ref 6.4–8.2)
PROT SERPL-MCNC: 5.8 G/DL (ref 6.4–8.2)
PROT SERPL-MCNC: 6.3 G/DL (ref 6.4–8.2)
PROT SERPL-MCNC: 6.4 G/DL (ref 6.4–8.2)
PROT SERPL-MCNC: 6.5 G/DL (ref 6.4–8.2)
PROT SERPL-MCNC: 6.8 G/DL (ref 6.4–8.2)
PROT SERPL-MCNC: 7 G/DL (ref 6.4–8.2)
PROT SERPL-MCNC: 7.1 G/DL (ref 6.4–8.2)
PROT SERPL-MCNC: 7.2 G/DL (ref 6.4–8.2)
PROT SERPL-MCNC: 7.9 G/DL (ref 6.4–8.2)
PROT SERPL-MCNC: 7.9 G/DL (ref 6.4–8.2)
PROT UR STRIP-MCNC: 100 MG/DL
PROT UR STRIP-MCNC: 30 MG/DL
PROT UR STRIP-MCNC: NEGATIVE MG/DL
Q-T INTERVAL, ECG07: 336 MS
Q-T INTERVAL, ECG07: 342 MS
Q-T INTERVAL, ECG07: 362 MS
QRS DURATION, ECG06: 104 MS
QRS DURATION, ECG06: 88 MS
QRS DURATION, ECG06: 94 MS
QTC CALCULATION (BEZET), ECG08: 441 MS
QTC CALCULATION (BEZET), ECG08: 476 MS
QTC CALCULATION (BEZET), ECG08: 479 MS
RBC # BLD AUTO: 2.82 M/UL (ref 3.8–5.2)
RBC # BLD AUTO: 2.97 M/UL (ref 3.8–5.2)
RBC # BLD AUTO: 2.98 M/UL (ref 3.8–5.2)
RBC # BLD AUTO: 3.16 M/UL (ref 3.8–5.2)
RBC # BLD AUTO: 3.19 M/UL (ref 3.8–5.2)
RBC # BLD AUTO: 3.21 M/UL (ref 3.8–5.2)
RBC # BLD AUTO: 3.25 M/UL (ref 3.8–5.2)
RBC # BLD AUTO: 3.43 M/UL (ref 3.8–5.2)
RBC # BLD AUTO: 3.52 M/UL (ref 3.8–5.2)
RBC # BLD AUTO: 3.55 M/UL (ref 3.8–5.2)
RBC # BLD AUTO: 3.67 M/UL (ref 3.8–5.2)
RBC # BLD AUTO: 3.75 M/UL (ref 3.8–5.2)
RBC # BLD AUTO: 3.81 M/UL (ref 3.8–5.2)
RBC # BLD AUTO: 4.12 M/UL (ref 3.8–5.2)
RBC # BLD AUTO: 4.27 M/UL (ref 3.8–5.2)
RBC # BLD AUTO: 4.28 M/UL (ref 3.8–5.2)
RBC # BLD AUTO: 4.45 M/UL (ref 3.8–5.2)
RBC # BLD AUTO: 4.66 M/UL (ref 3.8–5.2)
RBC # BLD AUTO: 5.2 M/UL (ref 3.8–5.2)
RBC #/AREA URNS HPF: ABNORMAL /HPF (ref 0–5)
RBC MORPH BLD: ABNORMAL
SAO2 % BLD: 92 % (ref 92–97)
SAO2 % BLD: 95 % (ref 92–97)
SAO2 % BLD: 96 % (ref 92–97)
SAO2 % BLD: 97 % (ref 92–97)
SAO2 % BLD: 97 % (ref 92–97)
SAO2 % BLD: 98 % (ref 92–97)
SAO2 % BLD: 98 % (ref 92–97)
SAO2 % BLD: 99 % (ref 92–97)
SAO2 % BLD: 99 % (ref 92–97)
SAO2 % BLDV: 78 % (ref 65–88)
SAO2 % BLDV: 81 % (ref 65–88)
SAO2 % BLDV: 86 % (ref 65–88)
SAO2% DEVICE SAO2% SENSOR NAME: ABNORMAL
SAO2% DEVICE SAO2% SENSOR NAME: NORMAL
SERVICE CMNT-IMP: ABNORMAL
SERVICE CMNT-IMP: NORMAL
SODIUM BLD-SCNC: 138 MMOL/L (ref 136–145)
SODIUM SERPL-SCNC: 129 MMOL/L (ref 136–145)
SODIUM SERPL-SCNC: 130 MMOL/L (ref 136–145)
SODIUM SERPL-SCNC: 130 MMOL/L (ref 136–145)
SODIUM SERPL-SCNC: 131 MMOL/L (ref 136–145)
SODIUM SERPL-SCNC: 131 MMOL/L (ref 136–145)
SODIUM SERPL-SCNC: 133 MMOL/L (ref 136–145)
SODIUM SERPL-SCNC: 133 MMOL/L (ref 136–145)
SODIUM SERPL-SCNC: 135 MMOL/L (ref 136–145)
SODIUM SERPL-SCNC: 136 MMOL/L (ref 136–145)
SODIUM SERPL-SCNC: 137 MMOL/L (ref 136–145)
SODIUM SERPL-SCNC: 138 MMOL/L (ref 136–145)
SODIUM SERPL-SCNC: 139 MMOL/L (ref 136–145)
SODIUM SERPL-SCNC: 140 MMOL/L (ref 136–145)
SODIUM SERPL-SCNC: 140 MMOL/L (ref 136–145)
SODIUM SERPL-SCNC: 141 MMOL/L (ref 136–145)
SODIUM SERPL-SCNC: 141 MMOL/L (ref 136–145)
SODIUM SERPL-SCNC: 142 MMOL/L (ref 136–145)
SODIUM SERPL-SCNC: 143 MMOL/L (ref 136–145)
SODIUM SERPL-SCNC: 144 MMOL/L (ref 136–145)
SODIUM SERPL-SCNC: 145 MMOL/L (ref 136–145)
SODIUM SERPL-SCNC: 145 MMOL/L (ref 136–145)
SODIUM SERPL-SCNC: 146 MMOL/L (ref 136–145)
SODIUM SERPL-SCNC: 147 MMOL/L (ref 136–145)
SODIUM SERPL-SCNC: 147 MMOL/L (ref 136–145)
SODIUM SERPL-SCNC: 148 MMOL/L (ref 136–145)
SODIUM SERPL-SCNC: 148 MMOL/L (ref 136–145)
SODIUM SERPL-SCNC: 149 MMOL/L (ref 136–145)
SODIUM SERPL-SCNC: 151 MMOL/L (ref 136–145)
SODIUM SERPL-SCNC: 151 MMOL/L (ref 136–145)
SODIUM SERPL-SCNC: 152 MMOL/L (ref 136–145)
SODIUM SERPL-SCNC: 153 MMOL/L (ref 136–145)
SODIUM SERPL-SCNC: 155 MMOL/L (ref 136–145)
SP GR UR REFRACTOMETRY: 1.02 (ref 1–1.03)
SP GR UR REFRACTOMETRY: 1.03 (ref 1–1.03)
SP GR UR REFRACTOMETRY: 1.03 (ref 1–1.03)
SP1: NORMAL
SP2: NORMAL
SP3: NORMAL
SPECIMEN SITE: ABNORMAL
SPECIMEN SITE: NORMAL
SPECIMEN TYPE: ABNORMAL
TOTAL RESP. RATE, ITRR: 12
TOTAL RESP. RATE, ITRR: 20
TRIGL SERPL-MCNC: 278 MG/DL (ref ?–150)
TRIGL SERPL-MCNC: 315 MG/DL (ref ?–150)
TRIGL SERPL-MCNC: 634 MG/DL (ref ?–150)
TROPONIN I BLD-MCNC: <0.04 NG/ML (ref 0–0.08)
TROPONIN I SERPL-MCNC: 0.11 NG/ML
TROPONIN I SERPL-MCNC: <0.04 NG/ML
TSH SERPL DL<=0.05 MIU/L-ACNC: 1.65 UIU/ML (ref 0.36–3.74)
UA: UC IF INDICATED,UAUC: ABNORMAL
UROBILINOGEN UR QL STRIP.AUTO: 0.2 EU/DL (ref 0.2–1)
VENTILATION MODE VENT: ABNORMAL
VENTRICULAR RATE, ECG03: 104 BPM
VENTRICULAR RATE, ECG03: 104 BPM
VENTRICULAR RATE, ECG03: 118 BPM
VLDLC SERPL CALC-MCNC: 55.6 MG/DL
VLDLC SERPL CALC-MCNC: 63 MG/DL
VLDLC SERPL CALC-MCNC: ABNORMAL MG/DL
VT SETTING VENT: 400 ML
WBC # BLD AUTO: 10.9 K/UL (ref 3.6–11)
WBC # BLD AUTO: 11.3 K/UL (ref 3.6–11)
WBC # BLD AUTO: 11.6 K/UL (ref 3.6–11)
WBC # BLD AUTO: 11.7 K/UL (ref 3.6–11)
WBC # BLD AUTO: 19.9 K/UL (ref 3.6–11)
WBC # BLD AUTO: 28.5 K/UL (ref 3.6–11)
WBC # BLD AUTO: 28.8 K/UL (ref 3.6–11)
WBC # BLD AUTO: 29.9 K/UL (ref 3.6–11)
WBC # BLD AUTO: 37.9 K/UL (ref 3.6–11)
WBC # BLD AUTO: 4.4 K/UL (ref 3.6–11)
WBC # BLD AUTO: 4.5 K/UL (ref 3.6–11)
WBC # BLD AUTO: 4.6 K/UL (ref 3.6–11)
WBC # BLD AUTO: 4.8 K/UL (ref 3.6–11)
WBC # BLD AUTO: 6.1 K/UL (ref 3.6–11)
WBC # BLD AUTO: 6.8 K/UL (ref 3.6–11)
WBC # BLD AUTO: 8.1 K/UL (ref 3.6–11)
WBC # BLD AUTO: 8.5 K/UL (ref 3.6–11)
WBC # BLD AUTO: 9 K/UL (ref 3.6–11)
WBC # BLD AUTO: 9.7 K/UL (ref 3.6–11)
WBC MORPH BLD: ABNORMAL
WBC URNS QL MICRO: ABNORMAL /HPF (ref 0–4)

## 2017-01-01 PROCEDURE — 77030018729 HC ELECTRD DEFIB PAD CARD -B

## 2017-01-01 PROCEDURE — 85025 COMPLETE CBC W/AUTO DIFF WBC: CPT | Performed by: HOSPITALIST

## 2017-01-01 PROCEDURE — 65610000006 HC RM INTENSIVE CARE

## 2017-01-01 PROCEDURE — 36415 COLL VENOUS BLD VENIPUNCTURE: CPT | Performed by: INTERNAL MEDICINE

## 2017-01-01 PROCEDURE — 74011250637 HC RX REV CODE- 250/637: Performed by: INTERNAL MEDICINE

## 2017-01-01 PROCEDURE — 77030018846 HC SOL IRR STRL H20 ICUM -A

## 2017-01-01 PROCEDURE — 84100 ASSAY OF PHOSPHORUS: CPT | Performed by: INTERNAL MEDICINE

## 2017-01-01 PROCEDURE — C9113 INJ PANTOPRAZOLE SODIUM, VIA: HCPCS | Performed by: INTERNAL MEDICINE

## 2017-01-01 PROCEDURE — 74011250636 HC RX REV CODE- 250/636: Performed by: INTERNAL MEDICINE

## 2017-01-01 PROCEDURE — 36415 COLL VENOUS BLD VENIPUNCTURE: CPT | Performed by: EMERGENCY MEDICINE

## 2017-01-01 PROCEDURE — 74011000258 HC RX REV CODE- 258: Performed by: HOSPITALIST

## 2017-01-01 PROCEDURE — 80048 BASIC METABOLIC PNL TOTAL CA: CPT | Performed by: INTERNAL MEDICINE

## 2017-01-01 PROCEDURE — 80053 COMPREHEN METABOLIC PANEL: CPT | Performed by: HOSPITALIST

## 2017-01-01 PROCEDURE — 36600 WITHDRAWAL OF ARTERIAL BLOOD: CPT | Performed by: EMERGENCY MEDICINE

## 2017-01-01 PROCEDURE — 83605 ASSAY OF LACTIC ACID: CPT | Performed by: EMERGENCY MEDICINE

## 2017-01-01 PROCEDURE — 80307 DRUG TEST PRSMV CHEM ANLYZR: CPT | Performed by: EMERGENCY MEDICINE

## 2017-01-01 PROCEDURE — 83735 ASSAY OF MAGNESIUM: CPT | Performed by: INTERNAL MEDICINE

## 2017-01-01 PROCEDURE — 77030034850

## 2017-01-01 PROCEDURE — 93306 TTE W/DOPPLER COMPLETE: CPT

## 2017-01-01 PROCEDURE — 74011250636 HC RX REV CODE- 250/636: Performed by: EMERGENCY MEDICINE

## 2017-01-01 PROCEDURE — 99285 EMERGENCY DEPT VISIT HI MDM: CPT

## 2017-01-01 PROCEDURE — 83036 HEMOGLOBIN GLYCOSYLATED A1C: CPT | Performed by: EMERGENCY MEDICINE

## 2017-01-01 PROCEDURE — 65270000032 HC RM SEMIPRIVATE

## 2017-01-01 PROCEDURE — 75810000137 HC PLCMT CENT VENOUS CATH

## 2017-01-01 PROCEDURE — 80053 COMPREHEN METABOLIC PANEL: CPT | Performed by: INTERNAL MEDICINE

## 2017-01-01 PROCEDURE — 84100 ASSAY OF PHOSPHORUS: CPT | Performed by: HOSPITALIST

## 2017-01-01 PROCEDURE — 65660000000 HC RM CCU STEPDOWN

## 2017-01-01 PROCEDURE — 83605 ASSAY OF LACTIC ACID: CPT | Performed by: INTERNAL MEDICINE

## 2017-01-01 PROCEDURE — 74011636637 HC RX REV CODE- 636/637: Performed by: INTERNAL MEDICINE

## 2017-01-01 PROCEDURE — 80175 DRUG SCREEN QUAN LAMOTRIGINE: CPT | Performed by: INTERNAL MEDICINE

## 2017-01-01 PROCEDURE — 96365 THER/PROPH/DIAG IV INF INIT: CPT

## 2017-01-01 PROCEDURE — 51702 INSERT TEMP BLADDER CATH: CPT

## 2017-01-01 PROCEDURE — 82962 GLUCOSE BLOOD TEST: CPT

## 2017-01-01 PROCEDURE — 85025 COMPLETE CBC W/AUTO DIFF WBC: CPT | Performed by: INTERNAL MEDICINE

## 2017-01-01 PROCEDURE — 96367 TX/PROPH/DG ADDL SEQ IV INF: CPT

## 2017-01-01 PROCEDURE — 36415 COLL VENOUS BLD VENIPUNCTURE: CPT | Performed by: HOSPITALIST

## 2017-01-01 PROCEDURE — 85025 COMPLETE CBC W/AUTO DIFF WBC: CPT | Performed by: STUDENT IN AN ORGANIZED HEALTH CARE EDUCATION/TRAINING PROGRAM

## 2017-01-01 PROCEDURE — 74011250637 HC RX REV CODE- 250/637: Performed by: HOSPITALIST

## 2017-01-01 PROCEDURE — 74011000258 HC RX REV CODE- 258: Performed by: STUDENT IN AN ORGANIZED HEALTH CARE EDUCATION/TRAINING PROGRAM

## 2017-01-01 PROCEDURE — 74011000258 HC RX REV CODE- 258: Performed by: INTERNAL MEDICINE

## 2017-01-01 PROCEDURE — 74011636637 HC RX REV CODE- 636/637: Performed by: STUDENT IN AN ORGANIZED HEALTH CARE EDUCATION/TRAINING PROGRAM

## 2017-01-01 PROCEDURE — 71010 XR CHEST PORT: CPT

## 2017-01-01 PROCEDURE — 74011000250 HC RX REV CODE- 250

## 2017-01-01 PROCEDURE — 74011636637 HC RX REV CODE- 636/637: Performed by: HOSPITALIST

## 2017-01-01 PROCEDURE — C1751 CATH, INF, PER/CENT/MIDLINE: HCPCS

## 2017-01-01 PROCEDURE — 74011000250 HC RX REV CODE- 250: Performed by: EMERGENCY MEDICINE

## 2017-01-01 PROCEDURE — 65270000015 HC RM PRIVATE ONCOLOGY

## 2017-01-01 PROCEDURE — 31500 INSERT EMERGENCY AIRWAY: CPT

## 2017-01-01 PROCEDURE — 96366 THER/PROPH/DIAG IV INF ADDON: CPT

## 2017-01-01 PROCEDURE — 80053 COMPREHEN METABOLIC PANEL: CPT | Performed by: EMERGENCY MEDICINE

## 2017-01-01 PROCEDURE — 81001 URINALYSIS AUTO W/SCOPE: CPT | Performed by: EMERGENCY MEDICINE

## 2017-01-01 PROCEDURE — 74011000250 HC RX REV CODE- 250: Performed by: INTERNAL MEDICINE

## 2017-01-01 PROCEDURE — 74011000258 HC RX REV CODE- 258: Performed by: EMERGENCY MEDICINE

## 2017-01-01 PROCEDURE — 96375 TX/PRO/DX INJ NEW DRUG ADDON: CPT

## 2017-01-01 PROCEDURE — 84100 ASSAY OF PHOSPHORUS: CPT | Performed by: EMERGENCY MEDICINE

## 2017-01-01 PROCEDURE — 80048 BASIC METABOLIC PNL TOTAL CA: CPT | Performed by: HOSPITALIST

## 2017-01-01 PROCEDURE — 87040 BLOOD CULTURE FOR BACTERIA: CPT | Performed by: EMERGENCY MEDICINE

## 2017-01-01 PROCEDURE — 77030008477 HC STYL SATN SLP COVD -A

## 2017-01-01 PROCEDURE — 85025 COMPLETE CBC W/AUTO DIFF WBC: CPT | Performed by: EMERGENCY MEDICINE

## 2017-01-01 PROCEDURE — 74011250636 HC RX REV CODE- 250/636: Performed by: STUDENT IN AN ORGANIZED HEALTH CARE EDUCATION/TRAINING PROGRAM

## 2017-01-01 PROCEDURE — 77030012940 HC DRSG HYDRCOIL BMS -B

## 2017-01-01 PROCEDURE — 83036 HEMOGLOBIN GLYCOSYLATED A1C: CPT | Performed by: FAMILY MEDICINE

## 2017-01-01 PROCEDURE — 77030011943

## 2017-01-01 PROCEDURE — 82803 BLOOD GASES ANY COMBINATION: CPT

## 2017-01-01 PROCEDURE — 80061 LIPID PANEL: CPT | Performed by: FAMILY MEDICINE

## 2017-01-01 PROCEDURE — 77030021678 HC GLIDESCP STAT DISP VERT -B

## 2017-01-01 PROCEDURE — 36600 WITHDRAWAL OF ARTERIAL BLOOD: CPT | Performed by: INTERNAL MEDICINE

## 2017-01-01 PROCEDURE — 87086 URINE CULTURE/COLONY COUNT: CPT | Performed by: EMERGENCY MEDICINE

## 2017-01-01 PROCEDURE — 81025 URINE PREGNANCY TEST: CPT

## 2017-01-01 PROCEDURE — 74011250636 HC RX REV CODE- 250/636: Performed by: HOSPITALIST

## 2017-01-01 PROCEDURE — 77030013079 HC BLNKT BAIR HGGR 3M -A

## 2017-01-01 PROCEDURE — 74011250637 HC RX REV CODE- 250/637: Performed by: FAMILY MEDICINE

## 2017-01-01 PROCEDURE — 36600 WITHDRAWAL OF ARTERIAL BLOOD: CPT

## 2017-01-01 PROCEDURE — 74011636637 HC RX REV CODE- 636/637: Performed by: EMERGENCY MEDICINE

## 2017-01-01 PROCEDURE — 83735 ASSAY OF MAGNESIUM: CPT | Performed by: FAMILY MEDICINE

## 2017-01-01 PROCEDURE — 77030008683 HC TU ET CUF COVD -A

## 2017-01-01 PROCEDURE — 84484 ASSAY OF TROPONIN QUANT: CPT

## 2017-01-01 PROCEDURE — 83605 ASSAY OF LACTIC ACID: CPT | Performed by: STUDENT IN AN ORGANIZED HEALTH CARE EDUCATION/TRAINING PROGRAM

## 2017-01-01 PROCEDURE — 85027 COMPLETE CBC AUTOMATED: CPT | Performed by: INTERNAL MEDICINE

## 2017-01-01 PROCEDURE — 70450 CT HEAD/BRAIN W/O DYE: CPT

## 2017-01-01 PROCEDURE — 74011636637 HC RX REV CODE- 636/637: Performed by: NURSE PRACTITIONER

## 2017-01-01 PROCEDURE — 83690 ASSAY OF LIPASE: CPT | Performed by: EMERGENCY MEDICINE

## 2017-01-01 PROCEDURE — 82010 KETONE BODYS QUAN: CPT | Performed by: EMERGENCY MEDICINE

## 2017-01-01 PROCEDURE — 77030013797 HC KT TRNSDUC PRSSR EDWD -A

## 2017-01-01 PROCEDURE — 5A1945Z RESPIRATORY VENTILATION, 24-96 CONSECUTIVE HOURS: ICD-10-PCS | Performed by: EMERGENCY MEDICINE

## 2017-01-01 PROCEDURE — 94003 VENT MGMT INPAT SUBQ DAY: CPT

## 2017-01-01 PROCEDURE — 96361 HYDRATE IV INFUSION ADD-ON: CPT

## 2017-01-01 PROCEDURE — 77030008771 HC TU NG SALEM SUMP -A

## 2017-01-01 PROCEDURE — 80053 COMPREHEN METABOLIC PANEL: CPT | Performed by: FAMILY MEDICINE

## 2017-01-01 PROCEDURE — 81025 URINE PREGNANCY TEST: CPT | Performed by: EMERGENCY MEDICINE

## 2017-01-01 PROCEDURE — 80048 BASIC METABOLIC PNL TOTAL CA: CPT | Performed by: EMERGENCY MEDICINE

## 2017-01-01 PROCEDURE — 74011000250 HC RX REV CODE- 250: Performed by: HOSPITALIST

## 2017-01-01 PROCEDURE — 83735 ASSAY OF MAGNESIUM: CPT | Performed by: EMERGENCY MEDICINE

## 2017-01-01 PROCEDURE — 77030019563 HC DEV ATTCH FEED HOLL -A

## 2017-01-01 PROCEDURE — 74011250636 HC RX REV CODE- 250/636: Performed by: NURSE PRACTITIONER

## 2017-01-01 PROCEDURE — 82803 BLOOD GASES ANY COMBINATION: CPT | Performed by: EMERGENCY MEDICINE

## 2017-01-01 PROCEDURE — 84100 ASSAY OF PHOSPHORUS: CPT | Performed by: FAMILY MEDICINE

## 2017-01-01 PROCEDURE — 77030011256 HC DRSG MEPILEX <16IN NO BORD MOLN -A

## 2017-01-01 PROCEDURE — 94762 N-INVAS EAR/PLS OXIMTRY CONT: CPT

## 2017-01-01 PROCEDURE — A9541 TC99M SULFUR COLLOID: HCPCS

## 2017-01-01 PROCEDURE — 96374 THER/PROPH/DIAG INJ IV PUSH: CPT

## 2017-01-01 PROCEDURE — 80048 BASIC METABOLIC PNL TOTAL CA: CPT | Performed by: STUDENT IN AN ORGANIZED HEALTH CARE EDUCATION/TRAINING PROGRAM

## 2017-01-01 PROCEDURE — 74011250636 HC RX REV CODE- 250/636

## 2017-01-01 PROCEDURE — 83036 HEMOGLOBIN GLYCOSYLATED A1C: CPT | Performed by: INTERNAL MEDICINE

## 2017-01-01 PROCEDURE — 82150 ASSAY OF AMYLASE: CPT | Performed by: EMERGENCY MEDICINE

## 2017-01-01 PROCEDURE — 84484 ASSAY OF TROPONIN QUANT: CPT | Performed by: INTERNAL MEDICINE

## 2017-01-01 PROCEDURE — 93005 ELECTROCARDIOGRAM TRACING: CPT

## 2017-01-01 PROCEDURE — 77010033678 HC OXYGEN DAILY

## 2017-01-01 PROCEDURE — 84484 ASSAY OF TROPONIN QUANT: CPT | Performed by: EMERGENCY MEDICINE

## 2017-01-01 PROCEDURE — 36600 WITHDRAWAL OF ARTERIAL BLOOD: CPT | Performed by: HOSPITALIST

## 2017-01-01 PROCEDURE — 74011250637 HC RX REV CODE- 250/637: Performed by: EMERGENCY MEDICINE

## 2017-01-01 PROCEDURE — 51701 INSERT BLADDER CATHETER: CPT

## 2017-01-01 PROCEDURE — 77030005563 HC CATH URETH INT MMGH -A

## 2017-01-01 PROCEDURE — 76937 US GUIDE VASCULAR ACCESS: CPT

## 2017-01-01 PROCEDURE — 80074 ACUTE HEPATITIS PANEL: CPT | Performed by: EMERGENCY MEDICINE

## 2017-01-01 PROCEDURE — 86702 HIV-2 ANTIBODY: CPT | Performed by: EMERGENCY MEDICINE

## 2017-01-01 PROCEDURE — 83605 ASSAY OF LACTIC ACID: CPT | Performed by: NURSE PRACTITIONER

## 2017-01-01 PROCEDURE — 74011000250 HC RX REV CODE- 250: Performed by: STUDENT IN AN ORGANIZED HEALTH CARE EDUCATION/TRAINING PROGRAM

## 2017-01-01 PROCEDURE — 82803 BLOOD GASES ANY COMBINATION: CPT | Performed by: INTERNAL MEDICINE

## 2017-01-01 PROCEDURE — 82009 KETONE BODYS QUAL: CPT | Performed by: EMERGENCY MEDICINE

## 2017-01-01 PROCEDURE — 80175 DRUG SCREEN QUAN LAMOTRIGINE: CPT | Performed by: EMERGENCY MEDICINE

## 2017-01-01 PROCEDURE — 36569 INSJ PICC 5 YR+ W/O IMAGING: CPT | Performed by: INTERNAL MEDICINE

## 2017-01-01 PROCEDURE — 82803 BLOOD GASES ANY COMBINATION: CPT | Performed by: HOSPITALIST

## 2017-01-01 PROCEDURE — 65270000029 HC RM PRIVATE

## 2017-01-01 PROCEDURE — 83735 ASSAY OF MAGNESIUM: CPT | Performed by: HOSPITALIST

## 2017-01-01 PROCEDURE — 85027 COMPLETE CBC AUTOMATED: CPT | Performed by: FAMILY MEDICINE

## 2017-01-01 PROCEDURE — 94002 VENT MGMT INPAT INIT DAY: CPT

## 2017-01-01 PROCEDURE — 82550 ASSAY OF CK (CPK): CPT | Performed by: EMERGENCY MEDICINE

## 2017-01-01 PROCEDURE — 81025 URINE PREGNANCY TEST: CPT | Performed by: INTERNAL MEDICINE

## 2017-01-01 PROCEDURE — 36415 COLL VENOUS BLD VENIPUNCTURE: CPT | Performed by: STUDENT IN AN ORGANIZED HEALTH CARE EDUCATION/TRAINING PROGRAM

## 2017-01-01 PROCEDURE — 83721 ASSAY OF BLOOD LIPOPROTEIN: CPT | Performed by: FAMILY MEDICINE

## 2017-01-01 PROCEDURE — 77030018786 HC NDL GD F/USND BARD -B

## 2017-01-01 PROCEDURE — 87536 HIV-1 QUANT&REVRSE TRNSCRPJ: CPT | Performed by: EMERGENCY MEDICINE

## 2017-01-01 PROCEDURE — 94761 N-INVAS EAR/PLS OXIMETRY MLT: CPT

## 2017-01-01 PROCEDURE — 77030033263 HC DRSG MEPILEX 16-48IN BORD MOLN -B

## 2017-01-01 PROCEDURE — 95816 EEG AWAKE AND DROWSY: CPT | Performed by: STUDENT IN AN ORGANIZED HEALTH CARE EDUCATION/TRAINING PROGRAM

## 2017-01-01 PROCEDURE — 83735 ASSAY OF MAGNESIUM: CPT | Performed by: STUDENT IN AN ORGANIZED HEALTH CARE EDUCATION/TRAINING PROGRAM

## 2017-01-01 PROCEDURE — 0BH17EZ INSERTION OF ENDOTRACHEAL AIRWAY INTO TRACHEA, VIA NATURAL OR ARTIFICIAL OPENING: ICD-10-PCS | Performed by: EMERGENCY MEDICINE

## 2017-01-01 PROCEDURE — 84100 ASSAY OF PHOSPHORUS: CPT | Performed by: STUDENT IN AN ORGANIZED HEALTH CARE EDUCATION/TRAINING PROGRAM

## 2017-01-01 PROCEDURE — 80047 BASIC METABLC PNL IONIZED CA: CPT

## 2017-01-01 PROCEDURE — 83880 ASSAY OF NATRIURETIC PEPTIDE: CPT | Performed by: FAMILY MEDICINE

## 2017-01-01 PROCEDURE — 74011000258 HC RX REV CODE- 258: Performed by: NURSE PRACTITIONER

## 2017-01-01 PROCEDURE — 84443 ASSAY THYROID STIM HORMONE: CPT | Performed by: FAMILY MEDICINE

## 2017-01-01 PROCEDURE — 36591 DRAW BLOOD OFF VENOUS DEVICE: CPT

## 2017-01-01 PROCEDURE — 96360 HYDRATION IV INFUSION INIT: CPT

## 2017-01-01 RX ORDER — SODIUM CHLORIDE 0.9 % (FLUSH) 0.9 %
5-10 SYRINGE (ML) INJECTION EVERY 8 HOURS
Status: DISCONTINUED | OUTPATIENT
Start: 2017-01-01 | End: 2017-01-01 | Stop reason: SDUPTHER

## 2017-01-01 RX ORDER — MUPIROCIN 20 MG/G
OINTMENT TOPICAL 2 TIMES DAILY
Status: DISCONTINUED | OUTPATIENT
Start: 2017-01-01 | End: 2017-01-01 | Stop reason: HOSPADM

## 2017-01-01 RX ORDER — ONDANSETRON 2 MG/ML
4 INJECTION INTRAMUSCULAR; INTRAVENOUS
Status: DISCONTINUED | OUTPATIENT
Start: 2017-01-01 | End: 2017-01-01 | Stop reason: HOSPADM

## 2017-01-01 RX ORDER — LORAZEPAM 2 MG/ML
2 INJECTION INTRAMUSCULAR
Status: DISCONTINUED | OUTPATIENT
Start: 2017-01-01 | End: 2017-01-01 | Stop reason: HOSPADM

## 2017-01-01 RX ORDER — DEXTROSE, SODIUM CHLORIDE, AND POTASSIUM CHLORIDE 5; .45; .3 G/100ML; G/100ML; G/100ML
INJECTION INTRAVENOUS CONTINUOUS
Status: DISCONTINUED | OUTPATIENT
Start: 2017-01-01 | End: 2017-01-01

## 2017-01-01 RX ORDER — OMEPRAZOLE 20 MG/1
20 CAPSULE, DELAYED RELEASE ORAL DAILY
Qty: 30 CAP | Refills: 1 | Status: SHIPPED | OUTPATIENT
Start: 2017-01-01 | End: 2017-01-01

## 2017-01-01 RX ORDER — MAGNESIUM SULFATE 100 %
4 CRYSTALS MISCELLANEOUS AS NEEDED
Status: DISCONTINUED | OUTPATIENT
Start: 2017-01-01 | End: 2017-01-01 | Stop reason: SDUPTHER

## 2017-01-01 RX ORDER — INSULIN LISPRO 100 [IU]/ML
INJECTION, SOLUTION INTRAVENOUS; SUBCUTANEOUS
Status: DISCONTINUED | OUTPATIENT
Start: 2017-01-01 | End: 2017-01-01

## 2017-01-01 RX ORDER — DEXTROSE 50 % IN WATER (D50W) INTRAVENOUS SYRINGE
12.5-25 AS NEEDED
Status: DISCONTINUED | OUTPATIENT
Start: 2017-01-01 | End: 2017-01-01 | Stop reason: SDUPTHER

## 2017-01-01 RX ORDER — INSULIN LISPRO 100 [IU]/ML
INJECTION, SOLUTION INTRAVENOUS; SUBCUTANEOUS
COMMUNITY

## 2017-01-01 RX ORDER — DEXTROSE 50 % IN WATER (D50W) INTRAVENOUS SYRINGE
12.5-25 AS NEEDED
Status: DISCONTINUED | OUTPATIENT
Start: 2017-01-01 | End: 2017-01-01 | Stop reason: HOSPADM

## 2017-01-01 RX ORDER — SODIUM CHLORIDE 0.9 % (FLUSH) 0.9 %
10 SYRINGE (ML) INJECTION AS NEEDED
Status: CANCELLED | OUTPATIENT
Start: 2017-01-01

## 2017-01-01 RX ORDER — INSULIN LISPRO 100 [IU]/ML
5 INJECTION, SOLUTION INTRAVENOUS; SUBCUTANEOUS
Status: DISCONTINUED | OUTPATIENT
Start: 2017-01-01 | End: 2017-01-01 | Stop reason: HOSPADM

## 2017-01-01 RX ORDER — SODIUM BICARBONATE 1 MEQ/ML
50 SYRINGE (ML) INTRAVENOUS
Status: COMPLETED | OUTPATIENT
Start: 2017-01-01 | End: 2017-01-01

## 2017-01-01 RX ORDER — SODIUM CHLORIDE 0.9 % (FLUSH) 0.9 %
5-10 SYRINGE (ML) INJECTION AS NEEDED
Status: DISCONTINUED | OUTPATIENT
Start: 2017-01-01 | End: 2017-01-01 | Stop reason: HOSPADM

## 2017-01-01 RX ORDER — MAGNESIUM SULFATE HEPTAHYDRATE 40 MG/ML
2 INJECTION, SOLUTION INTRAVENOUS ONCE
Status: COMPLETED | OUTPATIENT
Start: 2017-01-01 | End: 2017-01-01

## 2017-01-01 RX ORDER — POTASSIUM CHLORIDE 7.45 MG/ML
10 INJECTION INTRAVENOUS
Status: COMPLETED | OUTPATIENT
Start: 2017-01-01 | End: 2017-01-01

## 2017-01-01 RX ORDER — INSULIN LISPRO 100 [IU]/ML
INJECTION, SOLUTION INTRAVENOUS; SUBCUTANEOUS
Status: DISCONTINUED | OUTPATIENT
Start: 2017-01-01 | End: 2017-01-01 | Stop reason: HOSPADM

## 2017-01-01 RX ORDER — SODIUM CHLORIDE 0.9 % (FLUSH) 0.9 %
20 SYRINGE (ML) INJECTION AS NEEDED
Status: CANCELLED | OUTPATIENT
Start: 2017-01-01

## 2017-01-01 RX ORDER — LAMOTRIGINE 100 MG/1
200 TABLET ORAL 2 TIMES DAILY
Status: DISCONTINUED | OUTPATIENT
Start: 2017-01-01 | End: 2017-01-01 | Stop reason: HOSPADM

## 2017-01-01 RX ORDER — SODIUM CHLORIDE 0.9 % (FLUSH) 0.9 %
5-10 SYRINGE (ML) INJECTION AS NEEDED
Status: DISCONTINUED | OUTPATIENT
Start: 2017-01-01 | End: 2017-01-01 | Stop reason: SDUPTHER

## 2017-01-01 RX ORDER — INSULIN GLARGINE 100 [IU]/ML
15 INJECTION, SOLUTION SUBCUTANEOUS DAILY
Status: DISCONTINUED | OUTPATIENT
Start: 2017-01-01 | End: 2017-01-01

## 2017-01-01 RX ORDER — ACETAMINOPHEN 325 MG/1
650 TABLET ORAL
Status: DISCONTINUED | OUTPATIENT
Start: 2017-01-01 | End: 2017-01-01 | Stop reason: HOSPADM

## 2017-01-01 RX ORDER — ENOXAPARIN SODIUM 100 MG/ML
30 INJECTION SUBCUTANEOUS EVERY 24 HOURS
Status: DISCONTINUED | OUTPATIENT
Start: 2017-01-01 | End: 2017-01-01 | Stop reason: HOSPADM

## 2017-01-01 RX ORDER — SODIUM CHLORIDE 0.9 % (FLUSH) 0.9 %
5-10 SYRINGE (ML) INJECTION EVERY 8 HOURS
Status: DISCONTINUED | OUTPATIENT
Start: 2017-01-01 | End: 2017-01-01 | Stop reason: HOSPADM

## 2017-01-01 RX ORDER — POTASSIUM CHLORIDE 14.9 MG/ML
10 INJECTION INTRAVENOUS
Status: COMPLETED | OUTPATIENT
Start: 2017-01-01 | End: 2017-01-01

## 2017-01-01 RX ORDER — DEXTROSE 50 % IN WATER (D50W) INTRAVENOUS SYRINGE
12.5-25 AS NEEDED
Status: DISCONTINUED | OUTPATIENT
Start: 2017-01-01 | End: 2017-01-01

## 2017-01-01 RX ORDER — DEXTROSE MONOHYDRATE AND SODIUM CHLORIDE 5; .45 G/100ML; G/100ML
125 INJECTION, SOLUTION INTRAVENOUS AS NEEDED
Status: DISCONTINUED | OUTPATIENT
Start: 2017-01-01 | End: 2017-01-01 | Stop reason: HOSPADM

## 2017-01-01 RX ORDER — SODIUM CHLORIDE 9 MG/ML
75 INJECTION, SOLUTION INTRAVENOUS CONTINUOUS
Status: DISCONTINUED | OUTPATIENT
Start: 2017-01-01 | End: 2017-01-01

## 2017-01-01 RX ORDER — MAGNESIUM SULFATE 100 %
4 CRYSTALS MISCELLANEOUS AS NEEDED
Status: DISCONTINUED | OUTPATIENT
Start: 2017-01-01 | End: 2017-01-01

## 2017-01-01 RX ORDER — MUPIROCIN 20 MG/G
OINTMENT TOPICAL EVERY 12 HOURS
Status: DISCONTINUED | OUTPATIENT
Start: 2017-01-01 | End: 2017-01-01

## 2017-01-01 RX ORDER — LEVOFLOXACIN 5 MG/ML
750 INJECTION, SOLUTION INTRAVENOUS ONCE
Status: COMPLETED | OUTPATIENT
Start: 2017-01-01 | End: 2017-01-01

## 2017-01-01 RX ORDER — VANCOMYCIN/0.9 % SOD CHLORIDE 1.5G/250ML
1500 PLASTIC BAG, INJECTION (ML) INTRAVENOUS ONCE
Status: COMPLETED | OUTPATIENT
Start: 2017-01-01 | End: 2017-01-01

## 2017-01-01 RX ORDER — BISACODYL 5 MG
5 TABLET, DELAYED RELEASE (ENTERIC COATED) ORAL DAILY PRN
Status: DISCONTINUED | OUTPATIENT
Start: 2017-01-01 | End: 2017-01-01 | Stop reason: HOSPADM

## 2017-01-01 RX ORDER — ONDANSETRON 2 MG/ML
4 INJECTION INTRAMUSCULAR; INTRAVENOUS
Status: DISCONTINUED | OUTPATIENT
Start: 2017-01-01 | End: 2017-01-01

## 2017-01-01 RX ORDER — INSULIN LISPRO 100 [IU]/ML
INJECTION, SOLUTION INTRAVENOUS; SUBCUTANEOUS EVERY 6 HOURS
Status: DISCONTINUED | OUTPATIENT
Start: 2017-01-01 | End: 2017-01-01

## 2017-01-01 RX ORDER — ONDANSETRON 2 MG/ML
4 INJECTION INTRAMUSCULAR; INTRAVENOUS
Status: COMPLETED | OUTPATIENT
Start: 2017-01-01 | End: 2017-01-01

## 2017-01-01 RX ORDER — INSULIN LISPRO 100 [IU]/ML
3 INJECTION, SOLUTION INTRAVENOUS; SUBCUTANEOUS
Status: DISCONTINUED | OUTPATIENT
Start: 2017-01-01 | End: 2017-01-01 | Stop reason: HOSPADM

## 2017-01-01 RX ORDER — SODIUM CHLORIDE 9 MG/ML
150 INJECTION, SOLUTION INTRAVENOUS CONTINUOUS
Status: DISCONTINUED | OUTPATIENT
Start: 2017-01-01 | End: 2017-01-01

## 2017-01-01 RX ORDER — DEXTROSE, SODIUM CHLORIDE, AND POTASSIUM CHLORIDE 5; .45; .075 G/100ML; G/100ML; G/100ML
100 INJECTION INTRAVENOUS CONTINUOUS
Status: DISCONTINUED | OUTPATIENT
Start: 2017-01-01 | End: 2017-01-01

## 2017-01-01 RX ORDER — POTASSIUM CHLORIDE 29.8 MG/ML
20 INJECTION INTRAVENOUS ONCE
Status: COMPLETED | OUTPATIENT
Start: 2017-01-01 | End: 2017-01-01

## 2017-01-01 RX ORDER — POTASSIUM CHLORIDE 29.8 MG/ML
INJECTION INTRAVENOUS
Status: DISPENSED
Start: 2017-01-01 | End: 2017-01-01

## 2017-01-01 RX ORDER — DEXTROSE MONOHYDRATE 50 MG/ML
75 INJECTION, SOLUTION INTRAVENOUS CONTINUOUS
Status: DISCONTINUED | OUTPATIENT
Start: 2017-01-01 | End: 2017-01-01

## 2017-01-01 RX ORDER — HEPARIN SODIUM 5000 [USP'U]/ML
5000 INJECTION, SOLUTION INTRAVENOUS; SUBCUTANEOUS EVERY 12 HOURS
Status: DISCONTINUED | OUTPATIENT
Start: 2017-01-01 | End: 2017-01-01

## 2017-01-01 RX ORDER — POTASSIUM CHLORIDE 750 MG/1
40 TABLET, FILM COATED, EXTENDED RELEASE ORAL
Status: ACTIVE | OUTPATIENT
Start: 2017-01-01 | End: 2017-01-01

## 2017-01-01 RX ORDER — SODIUM CHLORIDE 0.9 % (FLUSH) 0.9 %
10-40 SYRINGE (ML) INJECTION EVERY 8 HOURS
Status: CANCELLED | OUTPATIENT
Start: 2017-01-01

## 2017-01-01 RX ORDER — LAMOTRIGINE 200 MG/1
200 TABLET ORAL 2 TIMES DAILY
Qty: 60 TAB | Refills: 3 | Status: SHIPPED
Start: 2017-01-01 | End: 2017-01-01

## 2017-01-01 RX ORDER — MAGNESIUM SULFATE 100 %
4 CRYSTALS MISCELLANEOUS AS NEEDED
Status: DISCONTINUED | OUTPATIENT
Start: 2017-01-01 | End: 2017-01-01 | Stop reason: HOSPADM

## 2017-01-01 RX ORDER — SODIUM BICARBONATE 1 MEQ/ML
SYRINGE (ML) INTRAVENOUS
Status: DISPENSED
Start: 2017-01-01 | End: 2017-01-01

## 2017-01-01 RX ORDER — HEPARIN SODIUM 5000 [USP'U]/ML
5000 INJECTION, SOLUTION INTRAVENOUS; SUBCUTANEOUS EVERY 8 HOURS
Status: DISCONTINUED | OUTPATIENT
Start: 2017-01-01 | End: 2017-01-01 | Stop reason: HOSPADM

## 2017-01-01 RX ORDER — CHLORHEXIDINE GLUCONATE 1.2 MG/ML
10 RINSE ORAL EVERY 12 HOURS
Status: DISCONTINUED | OUTPATIENT
Start: 2017-01-01 | End: 2017-01-01

## 2017-01-01 RX ORDER — FACIAL-BODY WIPES
10 EACH TOPICAL DAILY PRN
Status: DISCONTINUED | OUTPATIENT
Start: 2017-01-01 | End: 2017-01-01 | Stop reason: HOSPADM

## 2017-01-01 RX ORDER — SODIUM BICARBONATE 1 MEQ/ML
SYRINGE (ML) INTRAVENOUS
Status: DISCONTINUED
Start: 2017-01-01 | End: 2017-01-01

## 2017-01-01 RX ORDER — POTASSIUM CHLORIDE 29.8 MG/ML
20 INJECTION INTRAVENOUS
Status: COMPLETED | OUTPATIENT
Start: 2017-01-01 | End: 2017-01-01

## 2017-01-01 RX ORDER — PROPOFOL 10 MG/ML
INJECTION, EMULSION INTRAVENOUS
Status: DISPENSED
Start: 2017-01-01 | End: 2017-01-01

## 2017-01-01 RX ORDER — POTASSIUM CHLORIDE 7.45 MG/ML
10 INJECTION INTRAVENOUS
Status: ACTIVE | OUTPATIENT
Start: 2017-01-01 | End: 2017-01-01

## 2017-01-01 RX ORDER — MAGNESIUM SULFATE HEPTAHYDRATE 40 MG/ML
INJECTION, SOLUTION INTRAVENOUS
Status: DISPENSED
Start: 2017-01-01 | End: 2017-01-01

## 2017-01-01 RX ORDER — POTASSIUM CHLORIDE 1.5 G/1.77G
40 POWDER, FOR SOLUTION ORAL
Status: ACTIVE | OUTPATIENT
Start: 2017-01-01 | End: 2017-01-01

## 2017-01-01 RX ORDER — POTASSIUM CHLORIDE 7.45 MG/ML
10 INJECTION INTRAVENOUS
Status: DISCONTINUED | OUTPATIENT
Start: 2017-01-01 | End: 2017-01-01

## 2017-01-01 RX ORDER — INSULIN LISPRO 100 [IU]/ML
2 INJECTION, SOLUTION INTRAVENOUS; SUBCUTANEOUS ONCE
Status: COMPLETED | OUTPATIENT
Start: 2017-01-01 | End: 2017-01-01

## 2017-01-01 RX ORDER — DEXTROSE 50 % IN WATER (D50W) INTRAVENOUS SYRINGE
12.5-25 AS NEEDED
Status: DISCONTINUED | OUTPATIENT
Start: 2017-01-01 | End: 2017-01-01 | Stop reason: CLARIF

## 2017-01-01 RX ORDER — PROPOFOL 10 MG/ML
5-50 VIAL (ML) INTRAVENOUS
Status: DISCONTINUED | OUTPATIENT
Start: 2017-01-01 | End: 2017-01-01

## 2017-01-01 RX ORDER — INSULIN LISPRO 100 [IU]/ML
10 INJECTION, SOLUTION INTRAVENOUS; SUBCUTANEOUS ONCE
Status: COMPLETED | OUTPATIENT
Start: 2017-01-01 | End: 2017-01-01

## 2017-01-01 RX ORDER — SODIUM BICARBONATE 1 MEQ/ML
100 SYRINGE (ML) INTRAVENOUS
Status: DISCONTINUED | OUTPATIENT
Start: 2017-01-01 | End: 2017-01-01

## 2017-01-01 RX ORDER — ETOMIDATE 2 MG/ML
20 INJECTION INTRAVENOUS
Status: COMPLETED | OUTPATIENT
Start: 2017-01-01 | End: 2017-01-01

## 2017-01-01 RX ORDER — LAMOTRIGINE 200 MG/1
200 TABLET ORAL DAILY
Status: ON HOLD | COMMUNITY
End: 2017-01-01

## 2017-01-01 RX ORDER — SODIUM CHLORIDE 9 MG/ML
250 INJECTION, SOLUTION INTRAVENOUS CONTINUOUS
Status: DISCONTINUED | OUTPATIENT
Start: 2017-01-01 | End: 2017-01-01

## 2017-01-01 RX ORDER — MIDAZOLAM HYDROCHLORIDE 1 MG/ML
INJECTION, SOLUTION INTRAMUSCULAR; INTRAVENOUS
Status: COMPLETED
Start: 2017-01-01 | End: 2017-01-01

## 2017-01-01 RX ORDER — MIDAZOLAM HYDROCHLORIDE 1 MG/ML
2 INJECTION, SOLUTION INTRAMUSCULAR; INTRAVENOUS ONCE
Status: COMPLETED | OUTPATIENT
Start: 2017-01-01 | End: 2017-01-01

## 2017-01-01 RX ORDER — SODIUM CHLORIDE 450 MG/100ML
150 INJECTION, SOLUTION INTRAVENOUS CONTINUOUS
Status: DISCONTINUED | OUTPATIENT
Start: 2017-01-01 | End: 2017-01-01 | Stop reason: HOSPADM

## 2017-01-01 RX ORDER — ONDANSETRON 2 MG/ML
INJECTION INTRAMUSCULAR; INTRAVENOUS
Status: COMPLETED
Start: 2017-01-01 | End: 2017-01-01

## 2017-01-01 RX ORDER — DEXTROSE MONOHYDRATE AND SODIUM CHLORIDE 5; .9 G/100ML; G/100ML
150 INJECTION, SOLUTION INTRAVENOUS CONTINUOUS
Status: DISCONTINUED | OUTPATIENT
Start: 2017-01-01 | End: 2017-01-01

## 2017-01-01 RX ORDER — POTASSIUM CHLORIDE 750 MG/1
40 TABLET, FILM COATED, EXTENDED RELEASE ORAL
Status: COMPLETED | OUTPATIENT
Start: 2017-01-01 | End: 2017-01-01

## 2017-01-01 RX ORDER — POTASSIUM CHLORIDE 29.8 MG/ML
20 INJECTION INTRAVENOUS
Status: DISCONTINUED | OUTPATIENT
Start: 2017-01-01 | End: 2017-01-01

## 2017-01-01 RX ORDER — LAMOTRIGINE 100 MG/1
200 TABLET ORAL EVERY 12 HOURS
Status: DISCONTINUED | OUTPATIENT
Start: 2017-01-01 | End: 2017-01-01 | Stop reason: HOSPADM

## 2017-01-01 RX ORDER — DEXTROSE, SODIUM CHLORIDE, AND POTASSIUM CHLORIDE 5; .9; .15 G/100ML; G/100ML; G/100ML
200 INJECTION INTRAVENOUS CONTINUOUS
Status: DISCONTINUED | OUTPATIENT
Start: 2017-01-01 | End: 2017-01-01

## 2017-01-01 RX ORDER — HEPARIN 100 UNIT/ML
300 SYRINGE INTRAVENOUS AS NEEDED
Status: CANCELLED | OUTPATIENT
Start: 2017-01-01

## 2017-01-01 RX ORDER — SUCCINYLCHOLINE CHLORIDE 20 MG/ML
60 INJECTION INTRAMUSCULAR; INTRAVENOUS
Status: COMPLETED | OUTPATIENT
Start: 2017-01-01 | End: 2017-01-01

## 2017-01-01 RX ORDER — SODIUM CHLORIDE 0.9 % (FLUSH) 0.9 %
10 SYRINGE (ML) INJECTION EVERY 24 HOURS
Status: CANCELLED | OUTPATIENT
Start: 2017-01-01

## 2017-01-01 RX ORDER — METOPROLOL TARTRATE 5 MG/5ML
2.5 INJECTION INTRAVENOUS EVERY 6 HOURS
Status: DISCONTINUED | OUTPATIENT
Start: 2017-01-01 | End: 2017-01-01

## 2017-01-01 RX ORDER — SODIUM CHLORIDE 450 MG/100ML
75 INJECTION, SOLUTION INTRAVENOUS CONTINUOUS
Status: DISCONTINUED | OUTPATIENT
Start: 2017-01-01 | End: 2017-01-01

## 2017-01-01 RX ORDER — DEXTROSE MONOHYDRATE AND SODIUM CHLORIDE 5; .45 G/100ML; G/100ML
50 INJECTION, SOLUTION INTRAVENOUS CONTINUOUS
Status: DISCONTINUED | OUTPATIENT
Start: 2017-01-01 | End: 2017-01-01

## 2017-01-01 RX ORDER — HEPARIN SODIUM 5000 [USP'U]/ML
5000 INJECTION, SOLUTION INTRAVENOUS; SUBCUTANEOUS EVERY 8 HOURS
Status: DISCONTINUED | OUTPATIENT
Start: 2017-01-01 | End: 2017-01-01

## 2017-01-01 RX ORDER — DEXTROSE MONOHYDRATE 50 MG/ML
250 INJECTION, SOLUTION INTRAVENOUS CONTINUOUS
Status: DISCONTINUED | OUTPATIENT
Start: 2017-01-01 | End: 2017-01-01

## 2017-01-01 RX ORDER — DIPHENHYDRAMINE HYDROCHLORIDE 50 MG/ML
12.5 INJECTION, SOLUTION INTRAMUSCULAR; INTRAVENOUS
Status: DISCONTINUED | OUTPATIENT
Start: 2017-01-01 | End: 2017-01-01 | Stop reason: HOSPADM

## 2017-01-01 RX ORDER — PANTOPRAZOLE SODIUM 40 MG/1
40 TABLET, DELAYED RELEASE ORAL
Status: DISCONTINUED | OUTPATIENT
Start: 2017-01-01 | End: 2017-01-01 | Stop reason: HOSPADM

## 2017-01-01 RX ORDER — SODIUM BICARBONATE 1 MEQ/ML
100 SYRINGE (ML) INTRAVENOUS ONCE
Status: COMPLETED | OUTPATIENT
Start: 2017-01-01 | End: 2017-01-01

## 2017-01-01 RX ORDER — LAMOTRIGINE 200 MG/1
200 TABLET ORAL 2 TIMES DAILY
Qty: 60 TAB | Refills: 1 | Status: SHIPPED | OUTPATIENT
Start: 2017-01-01

## 2017-01-01 RX ORDER — SODIUM BICARBONATE 1 MEQ/ML
100 SYRINGE (ML) INTRAVENOUS
Status: COMPLETED | OUTPATIENT
Start: 2017-01-01 | End: 2017-01-01

## 2017-01-01 RX ORDER — ENOXAPARIN SODIUM 100 MG/ML
40 INJECTION SUBCUTANEOUS DAILY
Status: DISCONTINUED | OUTPATIENT
Start: 2017-01-01 | End: 2017-01-01 | Stop reason: DRUGHIGH

## 2017-01-01 RX ORDER — ENOXAPARIN SODIUM 100 MG/ML
30 INJECTION SUBCUTANEOUS EVERY 24 HOURS
Status: DISCONTINUED | OUTPATIENT
Start: 2017-01-01 | End: 2017-01-01

## 2017-01-01 RX ORDER — SODIUM CHLORIDE 0.9 % (FLUSH) 0.9 %
10-30 SYRINGE (ML) INJECTION AS NEEDED
Status: CANCELLED | OUTPATIENT
Start: 2017-01-01

## 2017-01-01 RX ORDER — CALCIUM CARBONATE 200(500)MG
400 TABLET,CHEWABLE ORAL
Status: DISCONTINUED | OUTPATIENT
Start: 2017-01-01 | End: 2017-01-01 | Stop reason: HOSPADM

## 2017-01-01 RX ORDER — SODIUM CHLORIDE 0.9 % (FLUSH) 0.9 %
SYRINGE (ML) INJECTION
Status: COMPLETED
Start: 2017-01-01 | End: 2017-01-01

## 2017-01-01 RX ORDER — ACETAMINOPHEN 650 MG/1
650 SUPPOSITORY RECTAL
Status: DISCONTINUED | OUTPATIENT
Start: 2017-01-01 | End: 2017-01-01 | Stop reason: HOSPADM

## 2017-01-01 RX ORDER — MIDAZOLAM IN 0.9 % SOD.CHLORID 1 MG/ML
1 PLASTIC BAG, INJECTION (ML) INTRAVENOUS
Status: DISCONTINUED | OUTPATIENT
Start: 2017-01-01 | End: 2017-01-01

## 2017-01-01 RX ORDER — BACITRACIN 500 UNIT/G
PACKET (EA) TOPICAL
Status: COMPLETED
Start: 2017-01-01 | End: 2017-01-01

## 2017-01-01 RX ORDER — LAMOTRIGINE 100 MG/1
200 TABLET ORAL 2 TIMES DAILY
Status: DISCONTINUED | OUTPATIENT
Start: 2017-01-01 | End: 2017-01-01

## 2017-01-01 RX ORDER — MAGNESIUM SULFATE 1 G/100ML
1 INJECTION INTRAVENOUS ONCE
Status: COMPLETED | OUTPATIENT
Start: 2017-01-01 | End: 2017-01-01

## 2017-01-01 RX ORDER — DEXTROSE, SODIUM CHLORIDE, AND POTASSIUM CHLORIDE 5; .2; .15 G/100ML; G/100ML; G/100ML
125 INJECTION INTRAVENOUS CONTINUOUS
Status: DISCONTINUED | OUTPATIENT
Start: 2017-01-01 | End: 2017-01-01

## 2017-01-01 RX ORDER — SODIUM CHLORIDE AND POTASSIUM CHLORIDE .9; .15 G/100ML; G/100ML
SOLUTION INTRAVENOUS CONTINUOUS
Status: DISCONTINUED | OUTPATIENT
Start: 2017-01-01 | End: 2017-01-01

## 2017-01-01 RX ORDER — MICONAZOLE NITRATE 2 %
1 CREAM WITH APPLICATOR VAGINAL EVERY EVENING
Status: DISCONTINUED | OUTPATIENT
Start: 2017-01-01 | End: 2017-01-01 | Stop reason: HOSPADM

## 2017-01-01 RX ORDER — NYSTATIN 100000 U/G
OINTMENT TOPICAL 3 TIMES DAILY
Status: DISCONTINUED | OUTPATIENT
Start: 2017-01-01 | End: 2017-01-01 | Stop reason: HOSPADM

## 2017-01-01 RX ORDER — DEXTROSE MONOHYDRATE AND SODIUM CHLORIDE 5; .225 G/100ML; G/100ML
125 INJECTION, SOLUTION INTRAVENOUS CONTINUOUS
Status: DISCONTINUED | OUTPATIENT
Start: 2017-01-01 | End: 2017-01-01

## 2017-01-01 RX ORDER — ENOXAPARIN SODIUM 100 MG/ML
30 INJECTION SUBCUTANEOUS DAILY
Status: DISCONTINUED | OUTPATIENT
Start: 2017-01-01 | End: 2017-01-01 | Stop reason: HOSPADM

## 2017-01-01 RX ORDER — METOCLOPRAMIDE HYDROCHLORIDE 5 MG/ML
10 INJECTION INTRAMUSCULAR; INTRAVENOUS
Status: COMPLETED | OUTPATIENT
Start: 2017-01-01 | End: 2017-01-01

## 2017-01-01 RX ORDER — POTASSIUM CHLORIDE 750 MG/1
40 TABLET, FILM COATED, EXTENDED RELEASE ORAL
Status: DISCONTINUED | OUTPATIENT
Start: 2017-01-01 | End: 2017-01-01

## 2017-01-01 RX ORDER — LIDOCAINE HYDROCHLORIDE AND EPINEPHRINE 10; 10 MG/ML; UG/ML
1.5 INJECTION, SOLUTION INFILTRATION; PERINEURAL ONCE
Status: COMPLETED | OUTPATIENT
Start: 2017-01-01 | End: 2017-01-01

## 2017-01-01 RX ORDER — POTASSIUM CHLORIDE 20MEQ/15ML
40 LIQUID (ML) ORAL EVERY 6 HOURS
Status: DISCONTINUED | OUTPATIENT
Start: 2017-01-01 | End: 2017-01-01

## 2017-01-01 RX ORDER — INSULIN LISPRO 100 [IU]/ML
INJECTION, SOLUTION INTRAVENOUS; SUBCUTANEOUS
Status: DISCONTINUED | OUTPATIENT
Start: 2017-01-01 | End: 2017-01-01 | Stop reason: SDUPTHER

## 2017-01-01 RX ADMIN — POTASSIUM CHLORIDE 20 MEQ: 400 INJECTION, SOLUTION INTRAVENOUS at 01:34

## 2017-01-01 RX ADMIN — SODIUM CHLORIDE 14.8 UNITS/HR: 900 INJECTION, SOLUTION INTRAVENOUS at 18:22

## 2017-01-01 RX ADMIN — CEFTRIAXONE 1 G: 1 INJECTION, POWDER, FOR SOLUTION INTRAMUSCULAR; INTRAVENOUS at 12:29

## 2017-01-01 RX ADMIN — SODIUM BICARBONATE 100 MEQ: 84 INJECTION, SOLUTION INTRAVENOUS at 16:50

## 2017-01-01 RX ADMIN — ONDANSETRON HYDROCHLORIDE 4 MG: 2 INJECTION, SOLUTION INTRAMUSCULAR; INTRAVENOUS at 15:21

## 2017-01-01 RX ADMIN — POTASSIUM CHLORIDE 40 MEQ: 750 TABLET, FILM COATED, EXTENDED RELEASE ORAL at 10:06

## 2017-01-01 RX ADMIN — ENOXAPARIN SODIUM 30 MG: 30 INJECTION SUBCUTANEOUS at 09:37

## 2017-01-01 RX ADMIN — INSULIN LISPRO 10 UNITS: 100 INJECTION, SOLUTION INTRAVENOUS; SUBCUTANEOUS at 06:35

## 2017-01-01 RX ADMIN — SODIUM CHLORIDE 17.3 UNITS/HR: 900 INJECTION, SOLUTION INTRAVENOUS at 17:22

## 2017-01-01 RX ADMIN — Medication 25 MCG/HR: at 14:30

## 2017-01-01 RX ADMIN — INSULIN LISPRO 3 UNITS: 100 INJECTION, SOLUTION INTRAVENOUS; SUBCUTANEOUS at 08:58

## 2017-01-01 RX ADMIN — Medication 10 ML: at 12:39

## 2017-01-01 RX ADMIN — POTASSIUM CHLORIDE 10 MEQ: 200 INJECTION, SOLUTION INTRAVENOUS at 22:45

## 2017-01-01 RX ADMIN — DEXTROSE MONOHYDRATE AND SODIUM CHLORIDE 125 ML/HR: 5; .225 INJECTION, SOLUTION INTRAVENOUS at 12:47

## 2017-01-01 RX ADMIN — METOPROLOL TARTRATE 2.5 MG: 5 INJECTION INTRAVENOUS at 17:13

## 2017-01-01 RX ADMIN — MUPIROCIN: 20 OINTMENT TOPICAL at 08:53

## 2017-01-01 RX ADMIN — SODIUM CHLORIDE 16.2 UNITS/HR: 900 INJECTION, SOLUTION INTRAVENOUS at 19:34

## 2017-01-01 RX ADMIN — Medication 10 ML: at 21:54

## 2017-01-01 RX ADMIN — INSULIN LISPRO 3 UNITS: 100 INJECTION, SOLUTION INTRAVENOUS; SUBCUTANEOUS at 18:05

## 2017-01-01 RX ADMIN — MUPIROCIN: 20 OINTMENT TOPICAL at 18:10

## 2017-01-01 RX ADMIN — INSULIN LISPRO 3 UNITS: 100 INJECTION, SOLUTION INTRAVENOUS; SUBCUTANEOUS at 12:46

## 2017-01-01 RX ADMIN — SODIUM CHLORIDE 75 ML/HR: 900 INJECTION, SOLUTION INTRAVENOUS at 15:43

## 2017-01-01 RX ADMIN — SODIUM CHLORIDE 1000 ML: 900 INJECTION, SOLUTION INTRAVENOUS at 18:11

## 2017-01-01 RX ADMIN — NYSTATIN: 100000 OINTMENT TOPICAL at 17:56

## 2017-01-01 RX ADMIN — ACETAMINOPHEN 650 MG: 325 TABLET, FILM COATED ORAL at 01:05

## 2017-01-01 RX ADMIN — Medication 5 ML: at 00:00

## 2017-01-01 RX ADMIN — PHENYLEPHRINE HYDROCHLORIDE 50 MCG/MIN: 10 INJECTION INTRAVENOUS at 00:59

## 2017-01-01 RX ADMIN — Medication 10 ML: at 13:08

## 2017-01-01 RX ADMIN — Medication 10 ML: at 06:00

## 2017-01-01 RX ADMIN — WATER: 1000 INJECTION, SOLUTION INTRAVENOUS at 15:01

## 2017-01-01 RX ADMIN — DEXTROSE MONOHYDRATE 250 ML/HR: 5 INJECTION, SOLUTION INTRAVENOUS at 02:14

## 2017-01-01 RX ADMIN — POTASSIUM CHLORIDE 20 MEQ: 400 INJECTION, SOLUTION INTRAVENOUS at 04:49

## 2017-01-01 RX ADMIN — SUCCINYLCHOLINE CHLORIDE 60 MG: 20 INJECTION, SOLUTION INTRAMUSCULAR; INTRAVENOUS at 11:50

## 2017-01-01 RX ADMIN — SODIUM CHLORIDE 1000 ML: 900 INJECTION, SOLUTION INTRAVENOUS at 00:30

## 2017-01-01 RX ADMIN — Medication 10 ML: at 08:11

## 2017-01-01 RX ADMIN — POTASSIUM CHLORIDE 10 MEQ: 10 INJECTION, SOLUTION INTRAVENOUS at 19:37

## 2017-01-01 RX ADMIN — POTASSIUM CHLORIDE 40 MEQ: 750 TABLET, FILM COATED, EXTENDED RELEASE ORAL at 16:42

## 2017-01-01 RX ADMIN — Medication 10 ML: at 05:31

## 2017-01-01 RX ADMIN — INSULIN HUMAN 15 UNITS: 100 INJECTION, SUSPENSION SUBCUTANEOUS at 21:11

## 2017-01-01 RX ADMIN — DEXTROSE MONOHYDRATE AND SODIUM CHLORIDE 250 ML/HR: 5; .45 INJECTION, SOLUTION INTRAVENOUS at 13:17

## 2017-01-01 RX ADMIN — CEFTRIAXONE 1 G: 1 INJECTION, POWDER, FOR SOLUTION INTRAMUSCULAR; INTRAVENOUS at 12:47

## 2017-01-01 RX ADMIN — DEXTROSE MONOHYDRATE AND SODIUM CHLORIDE 125 ML/HR: 5; .225 INJECTION, SOLUTION INTRAVENOUS at 06:18

## 2017-01-01 RX ADMIN — INSULIN LISPRO 5 UNITS: 100 INJECTION, SOLUTION INTRAVENOUS; SUBCUTANEOUS at 17:47

## 2017-01-01 RX ADMIN — INSULIN LISPRO 2 UNITS: 100 INJECTION, SOLUTION INTRAVENOUS; SUBCUTANEOUS at 16:53

## 2017-01-01 RX ADMIN — LAMOTRIGINE 200 MG: 100 TABLET ORAL at 17:45

## 2017-01-01 RX ADMIN — LAMOTRIGINE 200 MG: 100 TABLET ORAL at 08:11

## 2017-01-01 RX ADMIN — MUPIROCIN: 20 OINTMENT TOPICAL at 09:38

## 2017-01-01 RX ADMIN — PANTOPRAZOLE SODIUM 40 MG: 40 TABLET, DELAYED RELEASE ORAL at 18:12

## 2017-01-01 RX ADMIN — SODIUM CHLORIDE 10.8 UNITS/HR: 900 INJECTION, SOLUTION INTRAVENOUS at 12:48

## 2017-01-01 RX ADMIN — CALCIUM CARBONATE (ANTACID) CHEW TAB 500 MG 400 MG: 500 CHEW TAB at 01:12

## 2017-01-01 RX ADMIN — CHLORHEXIDINE GLUCONATE 10 ML: 1.2 RINSE ORAL at 21:21

## 2017-01-01 RX ADMIN — SODIUM CHLORIDE 10.8 UNITS/HR: 900 INJECTION, SOLUTION INTRAVENOUS at 18:36

## 2017-01-01 RX ADMIN — PIPERACILLIN SODIUM,TAZOBACTAM SODIUM 3.38 G: 3; .375 INJECTION, POWDER, FOR SOLUTION INTRAVENOUS at 14:23

## 2017-01-01 RX ADMIN — INSULIN LISPRO 10 UNITS: 100 INJECTION, SOLUTION INTRAVENOUS; SUBCUTANEOUS at 00:30

## 2017-01-01 RX ADMIN — DEXTROSE MONOHYDRATE AND SODIUM CHLORIDE 150 ML/HR: 5; .9 INJECTION, SOLUTION INTRAVENOUS at 05:01

## 2017-01-01 RX ADMIN — MAGNESIUM SULFATE HEPTAHYDRATE 2 G: 40 INJECTION, SOLUTION INTRAVENOUS at 01:38

## 2017-01-01 RX ADMIN — POTASSIUM CHLORIDE 20 MEQ: 400 INJECTION, SOLUTION INTRAVENOUS at 22:29

## 2017-01-01 RX ADMIN — ENOXAPARIN SODIUM 30 MG: 30 INJECTION SUBCUTANEOUS at 09:16

## 2017-01-01 RX ADMIN — POTASSIUM CHLORIDE 10 MEQ: 10 INJECTION, SOLUTION INTRAVENOUS at 20:55

## 2017-01-01 RX ADMIN — SODIUM CHLORIDE 1000 ML: 900 INJECTION, SOLUTION INTRAVENOUS at 12:39

## 2017-01-01 RX ADMIN — Medication 50 MCG/HR: at 04:51

## 2017-01-01 RX ADMIN — MIDAZOLAM HYDROCHLORIDE 2 MG: 1 INJECTION, SOLUTION INTRAMUSCULAR; INTRAVENOUS at 14:12

## 2017-01-01 RX ADMIN — DEXTROSE MONOHYDRATE AND SODIUM CHLORIDE 250 ML/HR: 5; .45 INJECTION, SOLUTION INTRAVENOUS at 22:20

## 2017-01-01 RX ADMIN — Medication 10 ML: at 21:42

## 2017-01-01 RX ADMIN — SODIUM CHLORIDE 10.8 UNITS/HR: 900 INJECTION, SOLUTION INTRAVENOUS at 18:16

## 2017-01-01 RX ADMIN — MUPIROCIN: 20 OINTMENT TOPICAL at 02:10

## 2017-01-01 RX ADMIN — SODIUM CHLORIDE 2000 ML: 900 INJECTION, SOLUTION INTRAVENOUS at 11:49

## 2017-01-01 RX ADMIN — ENOXAPARIN SODIUM 30 MG: 30 INJECTION SUBCUTANEOUS at 08:40

## 2017-01-01 RX ADMIN — NYSTATIN: 100000 OINTMENT TOPICAL at 10:17

## 2017-01-01 RX ADMIN — SODIUM CHLORIDE 150 ML/HR: 900 INJECTION, SOLUTION INTRAVENOUS at 00:29

## 2017-01-01 RX ADMIN — HUMAN INSULIN 10 UNITS: 100 INJECTION, SUSPENSION SUBCUTANEOUS at 16:54

## 2017-01-01 RX ADMIN — INSULIN HUMAN 15 UNITS: 100 INJECTION, SUSPENSION SUBCUTANEOUS at 08:52

## 2017-01-01 RX ADMIN — HEPARIN SODIUM 5000 UNITS: 5000 INJECTION, SOLUTION INTRAVENOUS; SUBCUTANEOUS at 09:07

## 2017-01-01 RX ADMIN — SODIUM CHLORIDE 1000 ML: 900 INJECTION, SOLUTION INTRAVENOUS at 04:41

## 2017-01-01 RX ADMIN — PROPOFOL 50 MCG/KG/MIN: 10 INJECTION, EMULSION INTRAVENOUS at 17:26

## 2017-01-01 RX ADMIN — DEXTROSE MONOHYDRATE 75 ML/HR: 5 INJECTION, SOLUTION INTRAVENOUS at 10:46

## 2017-01-01 RX ADMIN — SODIUM BICARBONATE: 84 INJECTION, SOLUTION INTRAVENOUS at 20:52

## 2017-01-01 RX ADMIN — MUPIROCIN: 20 OINTMENT TOPICAL at 08:29

## 2017-01-01 RX ADMIN — PIPERACILLIN AND TAZOBACTAM 3.38 G: 3; .375 INJECTION, POWDER, FOR SOLUTION INTRAVENOUS at 21:10

## 2017-01-01 RX ADMIN — INSULIN LISPRO 3 UNITS: 100 INJECTION, SOLUTION INTRAVENOUS; SUBCUTANEOUS at 18:08

## 2017-01-01 RX ADMIN — LAMOTRIGINE 200 MG: 100 TABLET ORAL at 17:43

## 2017-01-01 RX ADMIN — DEXTROSE MONOHYDRATE AND SODIUM CHLORIDE 50 ML/HR: 5; .45 INJECTION, SOLUTION INTRAVENOUS at 06:42

## 2017-01-01 RX ADMIN — LAMOTRIGINE 200 MG: 100 TABLET ORAL at 10:09

## 2017-01-01 RX ADMIN — MUPIROCIN: 20 OINTMENT TOPICAL at 10:22

## 2017-01-01 RX ADMIN — VANCOMYCIN HYDROCHLORIDE 1000 MG: 1 INJECTION, POWDER, LYOPHILIZED, FOR SOLUTION INTRAVENOUS at 04:55

## 2017-01-01 RX ADMIN — Medication 10 ML: at 22:28

## 2017-01-01 RX ADMIN — Medication 10 ML: at 21:58

## 2017-01-01 RX ADMIN — CEFEPIME HYDROCHLORIDE 2 G: 2 INJECTION, POWDER, FOR SOLUTION INTRAVENOUS at 19:11

## 2017-01-01 RX ADMIN — SODIUM CHLORIDE 1000 ML: 900 INJECTION, SOLUTION INTRAVENOUS at 13:11

## 2017-01-01 RX ADMIN — Medication: at 11:36

## 2017-01-01 RX ADMIN — PIPERACILLIN SODIUM,TAZOBACTAM SODIUM 3.38 G: 3; .375 INJECTION, POWDER, FOR SOLUTION INTRAVENOUS at 03:09

## 2017-01-01 RX ADMIN — CHLORHEXIDINE GLUCONATE 10 ML: 1.2 RINSE ORAL at 20:38

## 2017-01-01 RX ADMIN — LAMOTRIGINE 200 MG: 100 TABLET ORAL at 17:56

## 2017-01-01 RX ADMIN — INSULIN LISPRO 2 UNITS: 100 INJECTION, SOLUTION INTRAVENOUS; SUBCUTANEOUS at 21:48

## 2017-01-01 RX ADMIN — DIBASIC SODIUM PHOSPHATE, MONOBASIC POTASSIUM PHOSPHATE AND MONOBASIC SODIUM PHOSPHATE 1 TABLET: 852; 155; 130 TABLET ORAL at 18:08

## 2017-01-01 RX ADMIN — POTASSIUM CHLORIDE 10 MEQ: 10 INJECTION, SOLUTION INTRAVENOUS at 20:33

## 2017-01-01 RX ADMIN — POTASSIUM CHLORIDE 40 MEQ: 1.5 POWDER, FOR SOLUTION ORAL at 05:59

## 2017-01-01 RX ADMIN — MUPIROCIN: 20 OINTMENT TOPICAL at 21:20

## 2017-01-01 RX ADMIN — LAMOTRIGINE 200 MG: 100 TABLET ORAL at 11:49

## 2017-01-01 RX ADMIN — HEPARIN SODIUM 5000 UNITS: 5000 INJECTION, SOLUTION INTRAVENOUS; SUBCUTANEOUS at 00:05

## 2017-01-01 RX ADMIN — SODIUM CHLORIDE 150 ML/HR: 900 INJECTION, SOLUTION INTRAVENOUS at 14:45

## 2017-01-01 RX ADMIN — CHLORHEXIDINE GLUCONATE 10 ML: 1.2 RINSE ORAL at 17:14

## 2017-01-01 RX ADMIN — INSULIN LISPRO 3 UNITS: 100 INJECTION, SOLUTION INTRAVENOUS; SUBCUTANEOUS at 17:57

## 2017-01-01 RX ADMIN — PROPOFOL 10 MCG/KG/MIN: 10 INJECTION, EMULSION INTRAVENOUS at 12:07

## 2017-01-01 RX ADMIN — Medication 10 ML: at 14:18

## 2017-01-01 RX ADMIN — LAMOTRIGINE 200 MG: 100 TABLET ORAL at 09:31

## 2017-01-01 RX ADMIN — DEXTROSE MONOHYDRATE 150 ML/HR: 5 INJECTION, SOLUTION INTRAVENOUS at 20:19

## 2017-01-01 RX ADMIN — PIPERACILLIN AND TAZOBACTAM 3.38 G: 3; .375 INJECTION, POWDER, FOR SOLUTION INTRAVENOUS at 06:14

## 2017-01-01 RX ADMIN — Medication 10 ML: at 06:29

## 2017-01-01 RX ADMIN — SODIUM CHLORIDE 2.6 UNITS/HR: 900 INJECTION, SOLUTION INTRAVENOUS at 19:11

## 2017-01-01 RX ADMIN — POTASSIUM CHLORIDE 10 MEQ: 10 INJECTION, SOLUTION INTRAVENOUS at 10:00

## 2017-01-01 RX ADMIN — INSULIN LISPRO 7 UNITS: 100 INJECTION, SOLUTION INTRAVENOUS; SUBCUTANEOUS at 17:00

## 2017-01-01 RX ADMIN — SODIUM CHLORIDE 75 ML/HR: 900 INJECTION, SOLUTION INTRAVENOUS at 11:58

## 2017-01-01 RX ADMIN — ENOXAPARIN SODIUM 30 MG: 30 INJECTION SUBCUTANEOUS at 08:11

## 2017-01-01 RX ADMIN — INSULIN HUMAN 15 UNITS: 100 INJECTION, SUSPENSION SUBCUTANEOUS at 10:16

## 2017-01-01 RX ADMIN — MUPIROCIN: 20 OINTMENT TOPICAL at 08:20

## 2017-01-01 RX ADMIN — LAMOTRIGINE 200 MG: 100 TABLET ORAL at 08:41

## 2017-01-01 RX ADMIN — SODIUM CHLORIDE 9.6 UNITS/HR: 900 INJECTION, SOLUTION INTRAVENOUS at 00:54

## 2017-01-01 RX ADMIN — Medication 10 ML: at 06:07

## 2017-01-01 RX ADMIN — INSULIN LISPRO 2 UNITS: 100 INJECTION, SOLUTION INTRAVENOUS; SUBCUTANEOUS at 17:30

## 2017-01-01 RX ADMIN — HUMAN INSULIN 10 UNITS: 100 INJECTION, SUSPENSION SUBCUTANEOUS at 18:17

## 2017-01-01 RX ADMIN — METOPROLOL TARTRATE 2.5 MG: 5 INJECTION INTRAVENOUS at 17:25

## 2017-01-01 RX ADMIN — LAMOTRIGINE 200 MG: 100 TABLET ORAL at 21:58

## 2017-01-01 RX ADMIN — Medication 10 ML: at 00:16

## 2017-01-01 RX ADMIN — INSULIN HUMAN 18 UNITS: 100 INJECTION, SUSPENSION SUBCUTANEOUS at 08:41

## 2017-01-01 RX ADMIN — LAMOTRIGINE 200 MG: 100 TABLET ORAL at 12:25

## 2017-01-01 RX ADMIN — Medication 10 ML: at 13:35

## 2017-01-01 RX ADMIN — MUPIROCIN: 20 OINTMENT TOPICAL at 17:49

## 2017-01-01 RX ADMIN — LAMOTRIGINE 200 MG: 100 TABLET ORAL at 13:43

## 2017-01-01 RX ADMIN — Medication 10 ML: at 05:06

## 2017-01-01 RX ADMIN — DEXTROSE MONOHYDRATE 150 ML/HR: 5 INJECTION, SOLUTION INTRAVENOUS at 09:55

## 2017-01-01 RX ADMIN — SODIUM CHLORIDE 10.8 UNITS/HR: 900 INJECTION, SOLUTION INTRAVENOUS at 10:55

## 2017-01-01 RX ADMIN — HUMAN INSULIN 18 UNITS: 100 INJECTION, SUSPENSION SUBCUTANEOUS at 17:01

## 2017-01-01 RX ADMIN — ONDANSETRON HYDROCHLORIDE 4 MG: 2 INJECTION, SOLUTION INTRAMUSCULAR; INTRAVENOUS at 14:19

## 2017-01-01 RX ADMIN — POTASSIUM CHLORIDE 20 MEQ: 400 INJECTION, SOLUTION INTRAVENOUS at 21:25

## 2017-01-01 RX ADMIN — INSULIN HUMAN 15 UNITS: 100 INJECTION, SUSPENSION SUBCUTANEOUS at 22:38

## 2017-01-01 RX ADMIN — SODIUM CHLORIDE 21.6 UNITS/HR: 900 INJECTION, SOLUTION INTRAVENOUS at 15:15

## 2017-01-01 RX ADMIN — POTASSIUM PHOSPHATE, MONOBASIC AND POTASSIUM PHOSPHATE, DIBASIC: 224; 236 INJECTION, SOLUTION INTRAVENOUS at 12:30

## 2017-01-01 RX ADMIN — PROPOFOL 25 MCG/KG/MIN: 10 INJECTION, EMULSION INTRAVENOUS at 13:58

## 2017-01-01 RX ADMIN — Medication 10 ML: at 21:20

## 2017-01-01 RX ADMIN — SODIUM CHLORIDE 1000 ML: 900 INJECTION, SOLUTION INTRAVENOUS at 18:40

## 2017-01-01 RX ADMIN — NYSTATIN: 100000 OINTMENT TOPICAL at 22:27

## 2017-01-01 RX ADMIN — HEPARIN SODIUM 5000 UNITS: 5000 INJECTION, SOLUTION INTRAVENOUS; SUBCUTANEOUS at 12:11

## 2017-01-01 RX ADMIN — SODIUM CHLORIDE 250 ML/HR: 900 INJECTION, SOLUTION INTRAVENOUS at 01:10

## 2017-01-01 RX ADMIN — LAMOTRIGINE 200 MG: 100 TABLET ORAL at 20:40

## 2017-01-01 RX ADMIN — PIPERACILLIN AND TAZOBACTAM 3.38 G: 3; .375 INJECTION, POWDER, FOR SOLUTION INTRAVENOUS at 14:09

## 2017-01-01 RX ADMIN — INSULIN LISPRO 3 UNITS: 100 INJECTION, SOLUTION INTRAVENOUS; SUBCUTANEOUS at 17:24

## 2017-01-01 RX ADMIN — SODIUM CHLORIDE 500 ML: 900 INJECTION, SOLUTION INTRAVENOUS at 09:30

## 2017-01-01 RX ADMIN — ENOXAPARIN SODIUM 30 MG: 30 INJECTION SUBCUTANEOUS at 12:47

## 2017-01-01 RX ADMIN — SODIUM CHLORIDE 21.6 UNITS/HR: 900 INJECTION, SOLUTION INTRAVENOUS at 21:09

## 2017-01-01 RX ADMIN — Medication 10 ML: at 13:00

## 2017-01-01 RX ADMIN — SODIUM CHLORIDE 1000 ML: 900 INJECTION, SOLUTION INTRAVENOUS at 09:28

## 2017-01-01 RX ADMIN — PIPERACILLIN AND TAZOBACTAM 3.38 G: 3; .375 INJECTION, POWDER, FOR SOLUTION INTRAVENOUS at 06:01

## 2017-01-01 RX ADMIN — DEXTROSE MONOHYDRATE, SODIUM CHLORIDE, AND POTASSIUM CHLORIDE: 50; 4.5; 2.98 INJECTION, SOLUTION INTRAVENOUS at 08:53

## 2017-01-01 RX ADMIN — INSULIN LISPRO 2 UNITS: 100 INJECTION, SOLUTION INTRAVENOUS; SUBCUTANEOUS at 17:24

## 2017-01-01 RX ADMIN — HEPARIN SODIUM 5000 UNITS: 5000 INJECTION, SOLUTION INTRAVENOUS; SUBCUTANEOUS at 00:16

## 2017-01-01 RX ADMIN — POTASSIUM PHOSPHATE, MONOBASIC AND POTASSIUM PHOSPHATE, DIBASIC: 224; 236 INJECTION, SOLUTION INTRAVENOUS at 02:37

## 2017-01-01 RX ADMIN — INSULIN LISPRO 5 UNITS: 100 INJECTION, SOLUTION INTRAVENOUS; SUBCUTANEOUS at 12:41

## 2017-01-01 RX ADMIN — LAMOTRIGINE 200 MG: 100 TABLET ORAL at 17:01

## 2017-01-01 RX ADMIN — PHENYLEPHRINE HYDROCHLORIDE 10 MCG/MIN: 10 INJECTION INTRAVENOUS at 13:26

## 2017-01-01 RX ADMIN — DEXTROSE MONOHYDRATE AND SODIUM CHLORIDE 250 ML/HR: 5; .45 INJECTION, SOLUTION INTRAVENOUS at 03:00

## 2017-01-01 RX ADMIN — LAMOTRIGINE 200 MG: 100 TABLET ORAL at 22:39

## 2017-01-01 RX ADMIN — FOLIC ACID: 5 INJECTION, SOLUTION INTRAMUSCULAR; INTRAVENOUS; SUBCUTANEOUS at 02:22

## 2017-01-01 RX ADMIN — VANCOMYCIN HYDROCHLORIDE 1500 MG: 10 INJECTION, POWDER, LYOPHILIZED, FOR SOLUTION INTRAVENOUS at 16:50

## 2017-01-01 RX ADMIN — DIBASIC SODIUM PHOSPHATE, MONOBASIC POTASSIUM PHOSPHATE AND MONOBASIC SODIUM PHOSPHATE 1 TABLET: 852; 155; 130 TABLET ORAL at 15:36

## 2017-01-01 RX ADMIN — Medication 50 MCG/HR: at 01:11

## 2017-01-01 RX ADMIN — POTASSIUM CHLORIDE 40 MEQ: 750 TABLET, FILM COATED, EXTENDED RELEASE ORAL at 19:56

## 2017-01-01 RX ADMIN — LAMOTRIGINE 200 MG: 100 TABLET ORAL at 21:11

## 2017-01-01 RX ADMIN — MICONAZOLE NITRATE 200 MG: 200 SUPPOSITORY VAGINAL at 22:31

## 2017-01-01 RX ADMIN — METOPROLOL TARTRATE 2.5 MG: 5 INJECTION INTRAVENOUS at 06:12

## 2017-01-01 RX ADMIN — LAMOTRIGINE 200 MG: 100 TABLET ORAL at 17:38

## 2017-01-01 RX ADMIN — INSULIN HUMAN 10 UNITS: 100 INJECTION, SOLUTION PARENTERAL at 09:26

## 2017-01-01 RX ADMIN — HEPARIN SODIUM 5000 UNITS: 5000 INJECTION, SOLUTION INTRAVENOUS; SUBCUTANEOUS at 16:46

## 2017-01-01 RX ADMIN — SODIUM CHLORIDE 5 UNITS/HR: 900 INJECTION, SOLUTION INTRAVENOUS at 15:43

## 2017-01-01 RX ADMIN — MICONAZOLE NITRATE 1 APPLICATOR: 20 CREAM VAGINAL at 21:39

## 2017-01-01 RX ADMIN — MUPIROCIN: 20 OINTMENT TOPICAL at 21:22

## 2017-01-01 RX ADMIN — SODIUM CHLORIDE 40 MG: 9 INJECTION INTRAMUSCULAR; INTRAVENOUS; SUBCUTANEOUS at 09:38

## 2017-01-01 RX ADMIN — INSULIN LISPRO 5 UNITS: 100 INJECTION, SOLUTION INTRAVENOUS; SUBCUTANEOUS at 08:52

## 2017-01-01 RX ADMIN — INSULIN LISPRO 2 UNITS: 100 INJECTION, SOLUTION INTRAVENOUS; SUBCUTANEOUS at 17:34

## 2017-01-01 RX ADMIN — SODIUM BICARBONATE 100 MEQ: 84 INJECTION, SOLUTION INTRAVENOUS at 12:23

## 2017-01-01 RX ADMIN — SODIUM CHLORIDE 500 ML: 900 INJECTION, SOLUTION INTRAVENOUS at 06:06

## 2017-01-01 RX ADMIN — SODIUM CHLORIDE 14.3 UNITS/HR: 900 INJECTION, SOLUTION INTRAVENOUS at 15:08

## 2017-01-01 RX ADMIN — MAGNESIUM SULFATE HEPTAHYDRATE 2 G: 40 INJECTION, SOLUTION INTRAVENOUS at 01:40

## 2017-01-01 RX ADMIN — MUPIROCIN: 20 OINTMENT TOPICAL at 09:17

## 2017-01-01 RX ADMIN — POTASSIUM PHOSPHATE, MONOBASIC AND POTASSIUM PHOSPHATE, DIBASIC: 224; 236 INJECTION, SOLUTION INTRAVENOUS at 06:01

## 2017-01-01 RX ADMIN — Medication 10 ML: at 06:12

## 2017-01-01 RX ADMIN — Medication 10 ML: at 20:30

## 2017-01-01 RX ADMIN — POTASSIUM CHLORIDE 10 MEQ: 10 INJECTION, SOLUTION INTRAVENOUS at 21:55

## 2017-01-01 RX ADMIN — SODIUM CHLORIDE 14 UNITS/HR: 900 INJECTION, SOLUTION INTRAVENOUS at 00:34

## 2017-01-01 RX ADMIN — Medication 10 ML: at 05:10

## 2017-01-01 RX ADMIN — LAMOTRIGINE 200 MG: 100 TABLET ORAL at 18:08

## 2017-01-01 RX ADMIN — INSULIN LISPRO 2 UNITS: 100 INJECTION, SOLUTION INTRAVENOUS; SUBCUTANEOUS at 11:49

## 2017-01-01 RX ADMIN — NYSTATIN: 100000 OINTMENT TOPICAL at 09:48

## 2017-01-01 RX ADMIN — POTASSIUM CHLORIDE 40 MEQ: 20 SOLUTION ORAL at 11:49

## 2017-01-01 RX ADMIN — POTASSIUM CHLORIDE 10 MEQ: 10 INJECTION, SOLUTION INTRAVENOUS at 21:01

## 2017-01-01 RX ADMIN — MICONAZOLE NITRATE 200 MG: 200 SUPPOSITORY VAGINAL at 22:22

## 2017-01-01 RX ADMIN — SODIUM BICARBONATE 50 MEQ: 84 INJECTION INTRAVENOUS at 18:11

## 2017-01-01 RX ADMIN — SODIUM CHLORIDE: 900 INJECTION, SOLUTION INTRAVENOUS at 09:49

## 2017-01-01 RX ADMIN — LAMOTRIGINE 200 MG: 100 TABLET ORAL at 18:12

## 2017-01-01 RX ADMIN — SODIUM CHLORIDE 1000 ML: 900 INJECTION, SOLUTION INTRAVENOUS at 22:27

## 2017-01-01 RX ADMIN — INSULIN LISPRO 7 UNITS: 100 INJECTION, SOLUTION INTRAVENOUS; SUBCUTANEOUS at 08:41

## 2017-01-01 RX ADMIN — Medication 10 ML: at 06:35

## 2017-01-01 RX ADMIN — INSULIN HUMAN 15 UNITS: 100 INJECTION, SUSPENSION SUBCUTANEOUS at 08:58

## 2017-01-01 RX ADMIN — MUPIROCIN: 20 OINTMENT TOPICAL at 08:14

## 2017-01-01 RX ADMIN — DEXTROSE MONOHYDRATE 250 ML/HR: 5 INJECTION, SOLUTION INTRAVENOUS at 06:28

## 2017-01-01 RX ADMIN — MAGNESIUM SULFATE HEPTAHYDRATE 2 G: 40 INJECTION, SOLUTION INTRAVENOUS at 09:52

## 2017-01-01 RX ADMIN — NYSTATIN: 100000 OINTMENT TOPICAL at 22:00

## 2017-01-01 RX ADMIN — SODIUM CHLORIDE 40 MG: 9 INJECTION INTRAMUSCULAR; INTRAVENOUS; SUBCUTANEOUS at 09:14

## 2017-01-01 RX ADMIN — POTASSIUM CHLORIDE 10 MEQ: 10 INJECTION, SOLUTION INTRAVENOUS at 13:08

## 2017-01-01 RX ADMIN — POTASSIUM CHLORIDE 40 MEQ: 750 TABLET, FILM COATED, EXTENDED RELEASE ORAL at 15:37

## 2017-01-01 RX ADMIN — POTASSIUM CHLORIDE 10 MEQ: 10 INJECTION, SOLUTION INTRAVENOUS at 19:18

## 2017-01-01 RX ADMIN — SODIUM CHLORIDE 23 UNITS/HR: 900 INJECTION, SOLUTION INTRAVENOUS at 22:27

## 2017-01-01 RX ADMIN — SODIUM BICARBONATE: 84 INJECTION, SOLUTION INTRAVENOUS at 21:24

## 2017-01-01 RX ADMIN — POTASSIUM CHLORIDE 10 MEQ: 10 INJECTION, SOLUTION INTRAVENOUS at 11:50

## 2017-01-01 RX ADMIN — LAMOTRIGINE 200 MG: 100 TABLET ORAL at 21:21

## 2017-01-01 RX ADMIN — Medication 10 ML: at 05:01

## 2017-01-01 RX ADMIN — PANTOPRAZOLE SODIUM 40 MG: 40 TABLET, DELAYED RELEASE ORAL at 08:19

## 2017-01-01 RX ADMIN — HUMAN INSULIN 10 UNITS: 100 INJECTION, SUSPENSION SUBCUTANEOUS at 07:31

## 2017-01-01 RX ADMIN — HEPARIN SODIUM 5000 UNITS: 5000 INJECTION, SOLUTION INTRAVENOUS; SUBCUTANEOUS at 07:35

## 2017-01-01 RX ADMIN — INSULIN LISPRO 2 UNITS: 100 INJECTION, SOLUTION INTRAVENOUS; SUBCUTANEOUS at 21:57

## 2017-01-01 RX ADMIN — INSULIN HUMAN 15 UNITS: 100 INJECTION, SUSPENSION SUBCUTANEOUS at 10:50

## 2017-01-01 RX ADMIN — HEPARIN SODIUM 5000 UNITS: 5000 INJECTION, SOLUTION INTRAVENOUS; SUBCUTANEOUS at 05:03

## 2017-01-01 RX ADMIN — ENOXAPARIN SODIUM 30 MG: 30 INJECTION SUBCUTANEOUS at 12:37

## 2017-01-01 RX ADMIN — SODIUM CHLORIDE 40 MG: 9 INJECTION INTRAMUSCULAR; INTRAVENOUS; SUBCUTANEOUS at 17:14

## 2017-01-01 RX ADMIN — SODIUM CHLORIDE 250 ML: 900 INJECTION, SOLUTION INTRAVENOUS at 01:40

## 2017-01-01 RX ADMIN — LAMOTRIGINE 200 MG: 100 TABLET ORAL at 09:18

## 2017-01-01 RX ADMIN — POTASSIUM CHLORIDE 20 MEQ: 400 INJECTION, SOLUTION INTRAVENOUS at 02:39

## 2017-01-01 RX ADMIN — PIPERACILLIN AND TAZOBACTAM 3.38 G: 3; .375 INJECTION, POWDER, FOR SOLUTION INTRAVENOUS at 06:13

## 2017-01-01 RX ADMIN — Medication 10 ML: at 22:32

## 2017-01-01 RX ADMIN — MICONAZOLE NITRATE 1 APPLICATOR: 20 CREAM VAGINAL at 18:14

## 2017-01-01 RX ADMIN — LEVOFLOXACIN 750 MG: 5 INJECTION, SOLUTION INTRAVENOUS at 21:39

## 2017-01-01 RX ADMIN — SODIUM CHLORIDE 0.6 MCG/KG/HR: 900 INJECTION, SOLUTION INTRAVENOUS at 17:38

## 2017-01-01 RX ADMIN — POTASSIUM CHLORIDE 10 MEQ: 10 INJECTION, SOLUTION INTRAVENOUS at 22:08

## 2017-01-01 RX ADMIN — MAGNESIUM SULFATE HEPTAHYDRATE 2 G: 40 INJECTION, SOLUTION INTRAVENOUS at 05:59

## 2017-01-01 RX ADMIN — SODIUM CHLORIDE 8.3 UNITS/HR: 900 INJECTION, SOLUTION INTRAVENOUS at 16:30

## 2017-01-01 RX ADMIN — INSULIN LISPRO 2 UNITS: 100 INJECTION, SOLUTION INTRAVENOUS; SUBCUTANEOUS at 12:39

## 2017-01-01 RX ADMIN — Medication 10 ML: at 17:14

## 2017-01-01 RX ADMIN — DEXTROSE MONOHYDRATE, SODIUM CHLORIDE, AND POTASSIUM CHLORIDE 125 ML/HR: 50; 2.25; 1.49 INJECTION, SOLUTION INTRAVENOUS at 04:51

## 2017-01-01 RX ADMIN — POTASSIUM CHLORIDE 10 MEQ: 10 INJECTION, SOLUTION INTRAVENOUS at 00:31

## 2017-01-01 RX ADMIN — PROPOFOL 40 MCG/KG/MIN: 10 INJECTION, EMULSION INTRAVENOUS at 23:09

## 2017-01-01 RX ADMIN — Medication 10 ML: at 06:08

## 2017-01-01 RX ADMIN — SODIUM BICARBONATE 50 MEQ: 84 INJECTION, SOLUTION INTRAVENOUS at 12:10

## 2017-01-01 RX ADMIN — Medication 1 MG/HR: at 15:06

## 2017-01-01 RX ADMIN — INSULIN LISPRO 2 UNITS: 100 INJECTION, SOLUTION INTRAVENOUS; SUBCUTANEOUS at 11:56

## 2017-01-01 RX ADMIN — SODIUM CHLORIDE 3000 ML: 900 INJECTION, SOLUTION INTRAVENOUS at 01:20

## 2017-01-01 RX ADMIN — SODIUM CHLORIDE 75 ML/HR: 450 INJECTION, SOLUTION INTRAVENOUS at 14:16

## 2017-01-01 RX ADMIN — INSULIN LISPRO 3 UNITS: 100 INJECTION, SOLUTION INTRAVENOUS; SUBCUTANEOUS at 11:57

## 2017-01-01 RX ADMIN — HEPARIN SODIUM 5000 UNITS: 5000 INJECTION, SOLUTION INTRAVENOUS; SUBCUTANEOUS at 04:12

## 2017-01-01 RX ADMIN — HEPARIN SODIUM 5000 UNITS: 5000 INJECTION, SOLUTION INTRAVENOUS; SUBCUTANEOUS at 09:33

## 2017-01-01 RX ADMIN — NYSTATIN: 100000 OINTMENT TOPICAL at 10:10

## 2017-01-01 RX ADMIN — INSULIN HUMAN 15 UNITS: 100 INJECTION, SUSPENSION SUBCUTANEOUS at 21:49

## 2017-01-01 RX ADMIN — POTASSIUM CHLORIDE 10 MEQ: 10 INJECTION, SOLUTION INTRAVENOUS at 17:16

## 2017-01-01 RX ADMIN — PIPERACILLIN AND TAZOBACTAM 3.38 G: 3; .375 INJECTION, POWDER, FOR SOLUTION INTRAVENOUS at 22:02

## 2017-01-01 RX ADMIN — INSULIN HUMAN 10 UNITS: 100 INJECTION, SOLUTION PARENTERAL at 11:46

## 2017-01-01 RX ADMIN — DEXTROSE MONOHYDRATE 250 ML/HR: 5 INJECTION, SOLUTION INTRAVENOUS at 10:38

## 2017-01-01 RX ADMIN — LIDOCAINE HYDROCHLORIDE,EPINEPHRINE BITARTRATE 15 MG: 10; .01 INJECTION, SOLUTION INFILTRATION; PERINEURAL at 22:24

## 2017-01-01 RX ADMIN — Medication 10 ML: at 14:00

## 2017-01-01 RX ADMIN — INSULIN HUMAN 10 UNITS: 100 INJECTION, SOLUTION PARENTERAL at 01:19

## 2017-01-01 RX ADMIN — POTASSIUM CHLORIDE 10 MEQ: 10 INJECTION, SOLUTION INTRAVENOUS at 18:17

## 2017-01-01 RX ADMIN — LAMOTRIGINE 200 MG: 100 TABLET ORAL at 09:05

## 2017-01-01 RX ADMIN — INSULIN HUMAN 15 UNITS: 100 INJECTION, SUSPENSION SUBCUTANEOUS at 12:47

## 2017-01-01 RX ADMIN — INSULIN LISPRO 3 UNITS: 100 INJECTION, SOLUTION INTRAVENOUS; SUBCUTANEOUS at 12:37

## 2017-01-01 RX ADMIN — DEXTROSE MONOHYDRATE, SODIUM CHLORIDE, AND POTASSIUM CHLORIDE 125 ML/HR: 50; 2.25; 1.49 INJECTION, SOLUTION INTRAVENOUS at 19:42

## 2017-01-01 RX ADMIN — DEXTROSE MONOHYDRATE AND SODIUM CHLORIDE 150 ML/HR: 5; .9 INJECTION, SOLUTION INTRAVENOUS at 10:09

## 2017-01-01 RX ADMIN — POTASSIUM CHLORIDE 20 MEQ: 400 INJECTION, SOLUTION INTRAVENOUS at 20:18

## 2017-01-01 RX ADMIN — INSULIN LISPRO 2 UNITS: 100 INJECTION, SOLUTION INTRAVENOUS; SUBCUTANEOUS at 21:12

## 2017-01-01 RX ADMIN — HEPARIN SODIUM 5000 UNITS: 5000 INJECTION, SOLUTION INTRAVENOUS; SUBCUTANEOUS at 14:57

## 2017-01-01 RX ADMIN — CHLORHEXIDINE GLUCONATE 10 ML: 1.2 RINSE ORAL at 08:00

## 2017-01-01 RX ADMIN — MUPIROCIN: 20 OINTMENT TOPICAL at 17:38

## 2017-01-01 RX ADMIN — LAMOTRIGINE 200 MG: 100 TABLET ORAL at 17:21

## 2017-01-01 RX ADMIN — METOCLOPRAMIDE 10 MG: 5 INJECTION, SOLUTION INTRAMUSCULAR; INTRAVENOUS at 01:19

## 2017-01-01 RX ADMIN — NYSTATIN: 100000 OINTMENT TOPICAL at 16:47

## 2017-01-01 RX ADMIN — DEXTROSE MONOHYDRATE AND SODIUM CHLORIDE 125 ML/HR: 5; .225 INJECTION, SOLUTION INTRAVENOUS at 21:08

## 2017-01-01 RX ADMIN — SODIUM CHLORIDE 40 MG: 9 INJECTION INTRAMUSCULAR; INTRAVENOUS; SUBCUTANEOUS at 08:14

## 2017-01-01 RX ADMIN — SODIUM CHLORIDE 150 ML/HR: 450 INJECTION, SOLUTION INTRAVENOUS at 12:43

## 2017-01-01 RX ADMIN — SODIUM CHLORIDE 75 ML/HR: 450 INJECTION, SOLUTION INTRAVENOUS at 04:11

## 2017-01-01 RX ADMIN — SODIUM CHLORIDE 0.2 MCG/KG/HR: 900 INJECTION, SOLUTION INTRAVENOUS at 18:03

## 2017-01-01 RX ADMIN — Medication: at 06:33

## 2017-01-01 RX ADMIN — HEPARIN SODIUM 5000 UNITS: 5000 INJECTION, SOLUTION INTRAVENOUS; SUBCUTANEOUS at 20:04

## 2017-01-01 RX ADMIN — Medication: at 18:39

## 2017-01-01 RX ADMIN — MUPIROCIN: 20 OINTMENT TOPICAL at 08:42

## 2017-01-01 RX ADMIN — INSULIN LISPRO 2 UNITS: 100 INJECTION, SOLUTION INTRAVENOUS; SUBCUTANEOUS at 18:11

## 2017-01-01 RX ADMIN — DEXTROSE MONOHYDRATE, SODIUM CHLORIDE, AND POTASSIUM CHLORIDE 100 ML/HR: 50; 4.5; .745 INJECTION, SOLUTION INTRAVENOUS at 06:01

## 2017-01-01 RX ADMIN — INSULIN LISPRO 3 UNITS: 100 INJECTION, SOLUTION INTRAVENOUS; SUBCUTANEOUS at 07:15

## 2017-01-01 RX ADMIN — Medication: at 01:10

## 2017-01-01 RX ADMIN — POTASSIUM CHLORIDE 10 MEQ: 10 INJECTION, SOLUTION INTRAVENOUS at 19:55

## 2017-01-01 RX ADMIN — SODIUM CHLORIDE 500 ML: 900 INJECTION, SOLUTION INTRAVENOUS at 11:59

## 2017-01-01 RX ADMIN — VANCOMYCIN HYDROCHLORIDE 1000 MG: 1 INJECTION, POWDER, LYOPHILIZED, FOR SOLUTION INTRAVENOUS at 10:09

## 2017-01-01 RX ADMIN — WATER: 1000 INJECTION, SOLUTION INTRAVENOUS at 15:38

## 2017-01-01 RX ADMIN — Medication 10 ML: at 08:19

## 2017-01-01 RX ADMIN — INSULIN LISPRO 4 UNITS: 100 INJECTION, SOLUTION INTRAVENOUS; SUBCUTANEOUS at 21:53

## 2017-01-01 RX ADMIN — LAMOTRIGINE 200 MG: 100 TABLET ORAL at 10:22

## 2017-01-01 RX ADMIN — ETOMIDATE 20 MG: 2 INJECTION, SOLUTION INTRAVENOUS at 11:50

## 2017-01-01 RX ADMIN — POTASSIUM CHLORIDE 20 MEQ: 400 INJECTION, SOLUTION INTRAVENOUS at 02:12

## 2017-01-01 RX ADMIN — ONDANSETRON 4 MG: 2 INJECTION INTRAMUSCULAR; INTRAVENOUS at 00:16

## 2017-01-01 RX ADMIN — HEPARIN SODIUM 5000 UNITS: 5000 INJECTION, SOLUTION INTRAVENOUS; SUBCUTANEOUS at 16:43

## 2017-01-01 RX ADMIN — Medication: at 19:46

## 2017-01-01 RX ADMIN — DEXTROSE MONOHYDRATE, SODIUM CHLORIDE, AND POTASSIUM CHLORIDE 200 ML/HR: 50; 9; 1.49 INJECTION, SOLUTION INTRAVENOUS at 03:59

## 2017-01-01 RX ADMIN — PIPERACILLIN SODIUM,TAZOBACTAM SODIUM 3.38 G: 3; .375 INJECTION, POWDER, FOR SOLUTION INTRAVENOUS at 09:07

## 2017-01-01 RX ADMIN — SODIUM CHLORIDE 150 ML/HR: 900 INJECTION, SOLUTION INTRAVENOUS at 01:54

## 2017-01-01 RX ADMIN — ACETAMINOPHEN 650 MG: 325 TABLET, FILM COATED ORAL at 08:38

## 2017-01-01 RX ADMIN — POTASSIUM CHLORIDE 10 MEQ: 10 INJECTION, SOLUTION INTRAVENOUS at 23:04

## 2017-01-01 RX ADMIN — ENOXAPARIN SODIUM 30 MG: 30 INJECTION SUBCUTANEOUS at 08:00

## 2017-01-01 RX ADMIN — BACITRACIN ZINC: 500 OINTMENT TOPICAL at 16:00

## 2017-01-01 RX ADMIN — SODIUM CHLORIDE 0.9 UNITS/HR: 900 INJECTION, SOLUTION INTRAVENOUS at 06:13

## 2017-01-01 RX ADMIN — INSULIN HUMAN 15 UNITS: 100 INJECTION, SUSPENSION SUBCUTANEOUS at 08:20

## 2017-01-01 RX ADMIN — DEXTROSE MONOHYDRATE 150 ML/HR: 5 INJECTION, SOLUTION INTRAVENOUS at 03:57

## 2017-01-01 RX ADMIN — ONDANSETRON 4 MG: 2 INJECTION INTRAMUSCULAR; INTRAVENOUS at 21:40

## 2017-01-01 RX ADMIN — ONDANSETRON HYDROCHLORIDE 4 MG: 2 INJECTION, SOLUTION INTRAMUSCULAR; INTRAVENOUS at 21:40

## 2017-01-01 RX ADMIN — INSULIN HUMAN 15 UNITS: 100 INJECTION, SUSPENSION SUBCUTANEOUS at 21:58

## 2017-01-01 RX ADMIN — INSULIN LISPRO 3 UNITS: 100 INJECTION, SOLUTION INTRAVENOUS; SUBCUTANEOUS at 11:42

## 2017-01-01 RX ADMIN — PIPERACILLIN SODIUM,TAZOBACTAM SODIUM 3.38 G: 3; .375 INJECTION, POWDER, FOR SOLUTION INTRAVENOUS at 21:14

## 2017-01-01 RX ADMIN — INSULIN LISPRO 3 UNITS: 100 INJECTION, SOLUTION INTRAVENOUS; SUBCUTANEOUS at 08:20

## 2017-01-01 RX ADMIN — LAMOTRIGINE 200 MG: 100 TABLET ORAL at 08:19

## 2017-01-01 RX ADMIN — LAMOTRIGINE 200 MG: 100 TABLET ORAL at 08:58

## 2017-01-01 RX ADMIN — PIPERACILLIN AND TAZOBACTAM 3.38 G: 3; .375 INJECTION, POWDER, FOR SOLUTION INTRAVENOUS at 13:00

## 2017-01-01 RX ADMIN — CHLORHEXIDINE GLUCONATE 10 ML: 1.2 RINSE ORAL at 09:07

## 2017-01-01 RX ADMIN — INSULIN LISPRO 2 UNITS: 100 INJECTION, SOLUTION INTRAVENOUS; SUBCUTANEOUS at 08:45

## 2017-01-01 RX ADMIN — Medication 10 ML: at 21:48

## 2017-01-01 RX ADMIN — MICONAZOLE NITRATE 200 MG: 200 SUPPOSITORY VAGINAL at 02:17

## 2017-01-01 RX ADMIN — INSULIN LISPRO 2 UNITS: 100 INJECTION, SOLUTION INTRAVENOUS; SUBCUTANEOUS at 12:48

## 2017-01-01 RX ADMIN — INSULIN LISPRO 2 UNITS: 100 INJECTION, SOLUTION INTRAVENOUS; SUBCUTANEOUS at 12:12

## 2017-01-01 RX ADMIN — SODIUM CHLORIDE 75 ML/HR: 900 INJECTION, SOLUTION INTRAVENOUS at 04:10

## 2017-01-01 RX ADMIN — Medication 10 ML: at 13:10

## 2017-01-01 RX ADMIN — HUMAN INSULIN 10 UNITS: 100 INJECTION, SUSPENSION SUBCUTANEOUS at 13:28

## 2017-01-01 RX ADMIN — POTASSIUM CHLORIDE 40 MEQ: 1.5 POWDER, FOR SOLUTION ORAL at 06:53

## 2017-01-01 RX ADMIN — Medication 10 ML: at 21:25

## 2017-01-01 RX ADMIN — HUMAN INSULIN 10 UNITS: 100 INJECTION, SUSPENSION SUBCUTANEOUS at 16:43

## 2017-01-01 RX ADMIN — SODIUM BICARBONATE 50 MEQ: 84 INJECTION, SOLUTION INTRAVENOUS at 09:26

## 2017-01-01 RX ADMIN — POTASSIUM CHLORIDE 20 MEQ: 400 INJECTION, SOLUTION INTRAVENOUS at 03:41

## 2017-01-01 RX ADMIN — INSULIN HUMAN 18 UNITS: 100 INJECTION, SUSPENSION SUBCUTANEOUS at 14:17

## 2017-01-01 RX ADMIN — INSULIN LISPRO 2 UNITS: 100 INJECTION, SOLUTION INTRAVENOUS; SUBCUTANEOUS at 14:17

## 2017-01-01 RX ADMIN — LAMOTRIGINE 200 MG: 100 TABLET ORAL at 11:42

## 2017-01-01 RX ADMIN — INSULIN LISPRO 3 UNITS: 100 INJECTION, SOLUTION INTRAVENOUS; SUBCUTANEOUS at 12:07

## 2017-01-01 RX ADMIN — METOPROLOL TARTRATE 2.5 MG: 5 INJECTION INTRAVENOUS at 00:35

## 2017-01-01 RX ADMIN — HUMAN INSULIN 10 UNITS: 100 INJECTION, SUSPENSION SUBCUTANEOUS at 07:15

## 2017-01-01 RX ADMIN — MAGNESIUM SULFATE HEPTAHYDRATE 1 G: 1 INJECTION, SOLUTION INTRAVENOUS at 21:47

## 2017-01-01 RX ADMIN — Medication 10 ML: at 13:11

## 2017-01-01 RX ADMIN — ENOXAPARIN SODIUM 30 MG: 30 INJECTION SUBCUTANEOUS at 08:45

## 2017-01-01 RX ADMIN — INSULIN LISPRO 1 UNITS: 100 INJECTION, SOLUTION INTRAVENOUS; SUBCUTANEOUS at 21:58

## 2017-01-01 RX ADMIN — NYSTATIN: 100000 OINTMENT TOPICAL at 16:00

## 2017-01-01 RX ADMIN — METOPROLOL TARTRATE 2.5 MG: 5 INJECTION INTRAVENOUS at 12:08

## 2017-01-01 RX ADMIN — INSULIN HUMAN 15 UNITS: 100 INJECTION, SUSPENSION SUBCUTANEOUS at 08:10

## 2017-01-01 RX ADMIN — LAMOTRIGINE 200 MG: 100 TABLET ORAL at 08:52

## 2017-01-01 RX ADMIN — ENOXAPARIN SODIUM 30 MG: 30 INJECTION SUBCUTANEOUS at 09:05

## 2017-01-01 RX ADMIN — SODIUM CHLORIDE 40 MG: 9 INJECTION INTRAMUSCULAR; INTRAVENOUS; SUBCUTANEOUS at 08:00

## 2017-01-01 RX ADMIN — INSULIN LISPRO 3 UNITS: 100 INJECTION, SOLUTION INTRAVENOUS; SUBCUTANEOUS at 08:10

## 2017-01-01 RX ADMIN — SODIUM CHLORIDE 2000 ML: 900 INJECTION, SOLUTION INTRAVENOUS at 12:24

## 2017-01-01 RX ADMIN — HEPARIN SODIUM 5000 UNITS: 5000 INJECTION, SOLUTION INTRAVENOUS; SUBCUTANEOUS at 00:10

## 2017-01-01 RX ADMIN — INSULIN LISPRO 3 UNITS: 100 INJECTION, SOLUTION INTRAVENOUS; SUBCUTANEOUS at 17:45

## 2017-01-01 RX ADMIN — SODIUM CHLORIDE 8.3 UNITS/HR: 900 INJECTION, SOLUTION INTRAVENOUS at 11:15

## 2017-01-01 RX ADMIN — INSULIN LISPRO 3 UNITS: 100 INJECTION, SOLUTION INTRAVENOUS; SUBCUTANEOUS at 18:12

## 2017-01-01 RX ADMIN — Medication 10 ML: at 07:13

## 2017-01-01 RX ADMIN — Medication 10 ML: at 21:57

## 2017-01-01 RX ADMIN — HUMAN INSULIN 18 UNITS: 100 INJECTION, SUSPENSION SUBCUTANEOUS at 17:46

## 2017-01-01 RX ADMIN — LAMOTRIGINE 200 MG: 100 TABLET ORAL at 08:44

## 2017-02-21 PROBLEM — E87.20 LACTIC ACIDEMIA: Status: ACTIVE | Noted: 2017-01-01

## 2017-02-21 PROBLEM — R65.10 SIRS (SYSTEMIC INFLAMMATORY RESPONSE SYNDROME) (HCC): Status: ACTIVE | Noted: 2017-01-01

## 2017-02-21 PROBLEM — E87.20 ACIDOSIS, METABOLIC: Status: ACTIVE | Noted: 2017-01-01

## 2017-02-21 PROBLEM — R00.0 SINUS TACHYCARDIA: Status: ACTIVE | Noted: 2017-01-01

## 2017-02-21 PROBLEM — N17.9 ARF (ACUTE RENAL FAILURE) (HCC): Status: ACTIVE | Noted: 2017-01-01

## 2017-02-21 PROBLEM — D72.829 LEUKOCYTOSIS: Status: ACTIVE | Noted: 2017-01-01

## 2017-02-21 PROBLEM — E86.0 DEHYDRATION: Status: ACTIVE | Noted: 2017-01-01

## 2017-02-21 PROBLEM — G93.40 ACUTE ENCEPHALOPATHY: Status: ACTIVE | Noted: 2017-01-01

## 2017-02-21 NOTE — H&P
[address] [place] [address] [address] [facility]  [phone]    Hospitalist Admission Note      NAME:  Mini Bowser   :   1990   MRN:  [id number]     PCP:  Misa Zacarias MD     Date/Time:  2017 6:51 PM          Subjective:     CHIEF COMPLAINT: confusion    HISTORY OF PRESENT ILLNESS:     Ms. Moncho Hernandez is a 23 y.o.  female who presented to the Emergency Department complaining of confusion. She provides no reliable hx. No family is present. I have reveiwed the chart. I have discussed with ER staff and attending. I am informed she recently left [facility], having been admitted for DKA. Her chart suggests multiple psychiatric issues, and non-compliance with medications. She presents here with severe DKA, ARF, SIRS, acute encephalopathy. We will admit her for management. Past Medical History   Diagnosis Date    ARF (acute renal failure) ([facility] 2017    Bipolar disorder ([facility]     Contraception management      Gyn: Dr. Ila Martinez;  Nexplanon placed  and removed 7/6/15 because she wanted to conceive    Depression     Diabetes in pregnancy     Diabetes type I ([facility]      Age 7; on insulin pump    HX OTHER MEDICAL      late pnc at 25 weeks    HX OTHER MEDICAL      polyhydramnios; previous pregnancy    Unspecified epilepsy without mention of intractable epilepsy ([facility]      diabetic related        Past Surgical History   Procedure Laterality Date    Pr  delivery only  , 13     2 cesareans       Social History   Substance Use Topics    Smoking status: Current Every Day Smoker     Packs/day: 0.50     Years: 7.00     Types: Cigarettes    Smokeless tobacco: Never Used    Alcohol use No        Family History   Problem Relation Age of Onset    Hypertension Maternal Grandmother     High Cholesterol Maternal Grandmother     Hypertension Maternal Grandfather     High Cholesterol Maternal Grandfather     No Known Problems Mother     No Known Problems Father     Cancer Paternal Grandfather     Other Sister      stomach issues/gluten-lactose sensitive        Allergies   Allergen Reactions    Other Medication Rash     Tegaderm    Sulfa (Sulfonamide Antibiotics) Hives        Prior to Admission medications    Medication Sig Start Date End Date Taking? Authorizing Provider   insulin syringe-needle U-100 0.3 mL 31 gauge x 15/64\" syrg 1 Each by SubCUTAneous route five (5) times daily. Follow-up needed for refills 9/27/16   Desiree Hills MD   atorvastatin (LIPITOR) 20 mg tablet Take 1 Tab by mouth daily. Indications: HYPERLIPIDEMIA 8/3/16   Mark Baires MD   insulin lispro (HUMALOG) 100 unit/mL injection For use in pump. Max 100 units/day. (Novolog not on formulary) 6/24/16   Desiree Hills MD   lamoTRIgine (LAMICTAL XR) 200 mg tr24 ER tablet Take 2 Tabs by mouth daily. 3/11/16   Mark Baires MD   INSULIN PUMP CARTRIDGE SC by SubCUTAneous route.  Basal rate 1.0    Historical Provider   Insulin Pump Cartridge crtg Decrease basal rate to 0.8 12/4/15   Mylene Rod MD       Review of Systems: Cannot obtain  (bold if positive, if negative)    Gen:  Eyes:  ENT:  CVS:  Pulm:  GI:    :    MS:  Skin:  Psych:  Endo:    Hem:  Renal:    Neuro:        Objective:      VITALS:    Vital signs reviewed; most recent are:    Visit Vitals    /86    Pulse (!) 124    Temp 96.6 °F (35.9 °C)    Resp 20    Ht 5' 2\" (1.575 m)    Wt 60.6 kg (133 lb 9.6 oz)    SpO2 98%    BMI 24.44 kg/m2     SpO2 Readings from Last 6 Encounters:   02/21/17 98%   08/02/16 97%   12/22/15 99%   12/04/15 98%   02/12/15 98%   05/20/13 95%        No intake or output data in the 24 hours ending 02/21/17 1551     Exam:     Physical Exam:    Gen:  Thin, ill-appearing, disheveled, in mild acute distress  HEENT:  Pink conjunctivae, PERRL, hearing not intact to voice, dry mucous membranes  Neck:  Supple, without masses, thyroid non-tender  Resp:  No accessory muscle use, clear breath sounds without wheezes rales or rhonchi  Card:  No murmurs, tachycardic S1, S2 without thrills, bruits or peripheral edema  Abd:  Soft, non-tender, non-distended, normoactive bowel sounds are present, no mass  Lymph:  No cervical or inguinal adenopathy  Musc:  No cyanosis or clubbing  Skin:  No rashes or ulcers, skin turgor is reduced  Neuro:  Cranial nerves are grossly intact, general motor weakness, does not follow commands appropriately  Psych:  Somnolent. Labs:    Recent Labs      02/21/17   1631   WBC  28.8*   HGB  14.5   HCT  47.5*   PLT  436*     Recent Labs      02/21/17   1721   NA  138   K  5.4*   CL  100   CO2  <5*   GLU  849*   BUN  23*   CREA  1.59*   CA  8.2*   ALB  3.3*   TBILI  0.5   SGOT  35   ALT  29     Lab Results   Component Value Date/Time    Glucose (POC) >600 02/21/2017 04:08 PM    Glucose (POC) >600 02/21/2017 04:07 PM    Glucose (POC) >700 12/01/2015 05:28 PM     No results for input(s): PH, PCO2, PO2, HCO3, FIO2 in the last 72 hours. No results for input(s): INR in the last 72 hours. No lab exists for component: INREXT       Assessment and Plan:      DKA, type 1, not at goal / Insulin pump in place - DM diagnosed at age 7, on insulin pump since age 9. Recurrent DKA. Will likely die soon unless effective changes in compliance. For now insulin drip per protocol. Consult endocrinology. Resume pump if able. Diabetic diet and counseling when eating, NPO for now. SSI per protocol when off drip. Check A1c.    ARF (acute renal failure) / Dehydration / Acidosis, metabolic / Lactic acidemia - POA, due to DKA. Hydrate aggressively, bicarb for now. Follow labs and lytes q4hrs until stable. Anticipate hypokalemia. Acute encephalopathy / Bipolar disorder / Partial epilepsy with impairment of consciousness / Anxiety disorder / OCD (obsessive compulsive disorder) - POA, due to DKA. Drug screen negative. Check lamictal level. Consult psychiatry.   Sitter for now.    SIRS (systemic inflammatory response syndrome) / Leukocytosis / Sinus tachycardia - POA due to DKA. I see no evidence of bacterial infection. No ABx for now. Mixed hyperlipidemia - Suspect may be elevated due to DM.  Resume atorvastatin when eating    Telemetry reviewed:   normal sinus rhythm    Risk of deterioration: high      Total time spent with patient: 70 Minutes Intensive care                 Care Plan discussed with: Patient, Nursing Staff and >50% of time spent in counseling and coordination of care    Discussed:  Care Plan       ___________________________________________________    Attending Physician: Paz De León MD

## 2017-02-21 NOTE — IP AVS SNAPSHOT
[address] [facility] 
[phone] Patient: Lila Rushing MRN: [id number] :1990 You are allergic to the following Allergen Reactions Other Medication Rash Tegaderm Sulfa (Sulfonamide Antibiotics) Hives Recent Documentation Height  
  
  
  
  
  
 1.575 m Emergency Contacts Name Discharge Info Relation Home Work Mobile Maryanne Yan  Parent [1] [phone] About your hospitalization You were admitted on:  2017 You last received care in the:  [facility] CRITICAL CARE 2 You were discharged on:  2017 Unit phone number:  [phone] Why you were hospitalized Your primary diagnosis was:  Dka, Type 1, Not At Goal (Hcc) Your diagnoses also included:  Bipolar Disorder (Hcc), Insulin Pump In Place, Mixed Hyperlipidemia, Ocd (Obsessive Compulsive Disorder), Seizure Disorder (Hcc), Anxiety Disorder, Partial Epilepsy With Impairment Of Consciousness (Hcc), Arf (Acute Renal Failure) (Hcc), Dehydration, Acidosis, Metabolic, Leukocytosis, Acute Encephalopathy, Lactic Acidemia, Sinus Tachycardia, Sirs (Systemic Inflammatory Response Syndrome) (Hcc) Providers Seen During Your Hospitalizations Provider Role Specialty Primary office phone Aaliyah Maher DO Attending Provider Emergency Medicine [phone] Mandy Cortes MD Attending Provider Internal Medicine [phone] Reynold Florian MD Attending Provider Internal Medicine [phone] Your Primary Care Physician (PCP) ** None ** Follow-up Information Follow up With Details Comments Contact Info Elias Gardner MD  Please make an appmt once Medicaid is reinstated. [address] [place] [address] [address] [facility] 
[phone]  Bahman Brown the [facility] Go on 3/1/2017 Please go to Clinic to see if they can help you with supplies for insulin pump and continue using Clinic until you are notified that your Medicaid has been reinstated. [phone] [address] MD Go to Please make an appmt to see when your Medicaid is reinstated and continue seeing as primary care doctor. [address] [place] [address] [phone] Current Discharge Medication List  
  
START taking these medications Dose & Instructions Dispensing Information Comments Morning Noon Evening Bedtime  
 insulin  unit/mL injection Commonly known as:  Sheralyn Ket Your next dose is: Today, Tomorrow Other:  _________ Dose:  15 Units 15 Units by SubCUTAneous route Before breakfast and dinner. Quantity:  3 Vial  
Refills:  0 CONTINUE these medications which have NOT CHANGED Dose & Instructions Dispensing Information Comments Morning Noon Evening Bedtime  
 insulin lispro 100 unit/mL injection Commonly known as:  HUMALOG Your next dose is: Today, Tomorrow Other:  _________ For use in pump. Max 100 units/day. (Novolog not on formulary) Quantity:  9 Vial  
Refills:  0  
     
   
   
   
  
 insulin syringe-needle U-100 0.3 mL 31 gauge x 15/64\" Syrg Your next dose is: Today, Tomorrow Other:  _________ Dose:  1 Each  
1 Each by SubCUTAneous route five (5) times daily. Follow-up needed for refills Quantity:  150 Pen Needle Refills:  0 LaMICtal 200 mg tablet Generic drug:  lamoTRIgine Your next dose is: Today, Tomorrow Other:  _________ Dose:  200 mg Take 200 mg by mouth two (2) times a day. Refills:  0 STOP taking these medications Insulin Pump Cartridge Crtg INSULIN PUMP CARTRIDGE SC Where to Get Your Medications Information on where to get these meds will be given to you by the nurse or doctor. ! Ask your nurse or doctor about these medications  
  insulin  unit/mL injection Discharge Instructions None Discharge Orders None mycirQle Announcement We are excited to announce that we are making your provider's discharge notes available to you in mycirQle. You will see these notes when they are completed and signed by the physician that discharged you from your recent hospital stay. If you have any questions or concerns about any information you see in mycirQle, please call the Health Information Department where you were seen or reach out to your Primary Care Provider for more information about your plan of care. Introducing [facility]! Nevaeh Kenny introduces mycirQle patient portal. Now you can access parts of your medical record, email your doctor's office, and request medication refills online. 1. In your internet browser, go to [email or web address] 2. Click on the First Time User? Click Here link in the Sign In box. You will see the New Member Sign Up page. 3. Enter your mycirQle Access Code exactly as it appears below. You will not need to use this code after youve completed the sign-up process. If you do not sign up before the expiration date, you must request a new code. · mycirQle Access Code: [email or web address] Expires: 5/22/2017  4:04 PM 
 
4. Enter the last four digits of your Social Security Number (xxxx) and Date of Birth (mm/dd/yyyy) as indicated and click Submit. You will be taken to the next sign-up page. 5. Create a mycirQle ID. This will be your mycirQle login ID and cannot be changed, so think of one that is secure and easy to remember. 6. Create a my[employer]Qle password. You can change your password at any time. 7. Enter your Password Reset Question and Answer. This can be used at a later time if you forget your password. 8. Enter your e-mail address. You will receive e-mail notification when new information is available in [address] 9. Click Sign Up. You can now view and download portions of your medical record. 10. Click the Download Summary menu link to download a portable copy of your medical information. If you have questions, please visit the Frequently Asked Questions section of the [employer] website. Remember, [employer] is NOT to be used for urgent needs. For medical emergencies, dial 911. Now available from your iPhone and Android! General Information Please provide this summary of care documentation to your next provider. Patient Signature:  ____________________________________________________________ Date:  ____________________________________________________________  
  
Tomy Murcia Provider Signature:  ____________________________________________________________ Date:  ____________________________________________________________

## 2017-02-21 NOTE — ED TRIAGE NOTES
Patient arrives via EMS with complaints of hyperglycemia and AMS. EMS picked her up at a residence, patient speech is garbled and unable to distinguish words. BGL read HI on their monitor, and HI on our monitor. Patient is alert, confused and localizes pain. The patient is trying to take her pants off in the hallway, is clearly altered. Unable to determine how pregnant the patient is. Per EMS she was on an insulin pump and transitioned over to injections. Unable to ask the patient any questions and she does not answer appropriately. Patient changed into a gown, placed on the monitor x3, call bell in hand and side rails up.

## 2017-02-21 NOTE — IP AVS SNAPSHOT
Current Discharge Medication List  
  
Take these medications at their scheduled times Dose & Instructions Dispensing Information Comments Morning Noon Evening Bedtime  
 insulin  unit/mL injection Commonly known as:  Tiffanie Robertsonn Your next dose is: Today, Tomorrow Other:  ____________ Dose:  15 Units 15 Units by SubCUTAneous route Before breakfast and dinner. Quantity:  3 Vial  
Refills:  0  
     
   
   
   
  
 insulin syringe-needle U-100 0.3 mL 31 gauge x 15/64\" Syrg Your next dose is: Today, Tomorrow Other:  ____________ Dose:  1 Each  
1 Each by SubCUTAneous route five (5) times daily. Follow-up needed for refills Quantity:  150 Pen Needle Refills:  0 LaMICtal 200 mg tablet Generic drug:  lamoTRIgine Your next dose is: Today, Tomorrow Other:  ____________ Dose:  200 mg Take 200 mg by mouth two (2) times a day. Refills:  0 Take these medications as directed Dose & Instructions Dispensing Information Comments Morning Noon Evening Bedtime  
 insulin lispro 100 unit/mL injection Commonly known as:  HUMALOG Your next dose is: Today, Tomorrow Other:  ____________ For use in pump. Max 100 units/day. (Novolog not on formulary) Quantity:  9 Vial  
Refills:  0 Where to Get Your Medications Information about where to get these medications is not yet available ! Ask your nurse or doctor about these medications  
  insulin  unit/mL injection

## 2017-02-21 NOTE — ED PROVIDER NOTES
HPI Comments: Sole Lainez is a 23 y.o. female, pmhx significant for DM, bipolar with depression, who presents via EMS to the ED for evaluation for an altered state induced by hyperglycemia. Pt's roommate called 911 after noticing her state. Pt's speech is slurred, and she is unable to follow commands but she is oriented to herself. Her roommate states that she was seen by her PCP and states that her insulin was increased, but he is unsure of the dosage. Pt was on an insulin pump, but is now transitioning to insulin injections due to insurance changes. Pt's roommate unsure of how much insulin she's on, or how compliant she is with the tx. He notes that she has poor, unhealthy dietary habits. Her roommate notes that she went to [facility] 1 week ago and her blood sugar was greater than 700 at the time. Pt signed out of the hospital AMA. PCP: Yulissa Jack MD    Social Hx: +tobacco (0.5 ppd), -EtOH, -Illicit Drugs    History is otherwise limited secondary to the condition of the patient. The history is provided by a friend and the EMS personnel. The history is limited by the condition of the patient. No  was used. Past Medical History:   Diagnosis Date    Bipolar disorder [facility])     Contraception management      Gyn: Dr. Gore Distance;  Nexplanon placed  and removed 7/6/15 because she wanted to conceive    Depression     Diabetes in pregnancy     Diabetes type I ([facility] [address]      Age 7; on insulin pump    HX OTHER MEDICAL      late pnc at 25 weeks    HX OTHER MEDICAL      polyhydramnios; previous pregnancy    Unspecified epilepsy without mention of intractable epilepsy ([facility] [address]      diabetic related       Past Surgical History:   Procedure Laterality Date    Pr  delivery only  , 14     2 cesareans         Family History:   Problem Relation Age of Onset    Hypertension Maternal Grandmother     High Cholesterol Maternal Grandmother     Hypertension Maternal Grandfather     High Cholesterol Maternal Grandfather     No Known Problems Mother     No Known Problems Father     Cancer Paternal Grandfather     Other Sister      stomach issues/gluten-lactose sensitive       Social History     Social History    Marital status: SINGLE     Spouse name: N/A    Number of children: N/A    Years of education: N/A     Occupational History    Not on file. Social History Main Topics    Smoking status: Current Every Day Smoker     Packs/day: 0.50     Years: 7.00     Types: Cigarettes    Smokeless tobacco: Never Used    Alcohol use No    Drug use: No    Sexual activity: Yes     Partners: Male     Birth control/ protection: Pill     Other Topics Concern    Not on file     Social History Narrative         ALLERGIES: Other medication and Sulfa (sulfonamide antibiotics)    Review of Systems   Unable to perform ROS: Acuity of condition       Patient Vitals for the past 12 hrs:   Temp Pulse Resp BP SpO2   02/21/17 2300 - (!) 127 19 105/66 99 %   02/21/17 2200 97.8 °F (36.6 °C) (!) 120 19 112/69 100 %   02/21/17 2100 - (!) 126 25 113/70 99 %   02/21/17 2045 - (!) 126 22 116/77 100 %   02/21/17 2000 95.8 °F (35.4 °C) (!) 120 23 124/82 100 %   02/21/17 1945 - (!) 124 24 128/89 100 %   02/21/17 1900 - (!) 120 24 128/86 95 %   02/21/17 1815 - (!) 124 20 130/86 98 %   02/21/17 1800 - (!) 113 25 142/74 100 %   02/21/17 1715 - (!) 128 22 137/81 99 %   02/21/17 1621 96.6 °F (35.9 °C) (!) 123 26 (!) 133/94 100 %       Physical Exam   Constitutional: She appears well-developed and well-nourished. She appears lethargic. HENT:   Head: Normocephalic and atraumatic. Mouth/Throat: Mucous membranes are dry. Eyes: EOM are normal. Pupils are equal, round, and reactive to light. Neck: Normal range of motion. No JVD present. No tracheal deviation present. Cardiovascular: Normal heart sounds and intact distal pulses. An irregularly irregular rhythm present. Tachycardia present.   Exam reveals no gallop and no friction rub. No murmur heard. Pulmonary/Chest: Effort normal. No stridor. She has no wheezes. She has no rales. Tapicnic   Abdominal: Soft. Bowel sounds are normal. She exhibits no distension and no mass. There is no tenderness. There is no guarding. Palpable bladder   Musculoskeletal: Normal range of motion. She exhibits no edema or tenderness. Neurological: She appears lethargic. Moving all extremities spontaneously, slurred speech. Not following commands. Skin: Skin is warm and dry. No rash noted. Nursing note and vitals reviewed. MDM  Number of Diagnoses or Management Options  Diabetic ketoacidosis with coma associated with diabetes mellitus due to underlying condition [facility]):   Diagnosis management comments: Pt with a hx of poorly controlled diabetes, now presenting with AMS, tachypnea, tachycardia and high BS. DDx includes DKA, metabolic abnormality, dehydration, lewis, uti. Unclear whether patient is pregnant, will check upt. Will treat with ivf, insulin, replete electrolytes as needed. Admit. Amount and/or Complexity of Data Reviewed  Clinical lab tests: reviewed and ordered  Obtain history from someone other than the patient: yes (Roommate, EMS)  Review and summarize past medical records: yes  Discuss the patient with other providers: yes (Hospitalist )      ED Course       Procedures    CONSULT NOTE:   6:34 PM  Freida Cordova DO spoke with Dr. Sammi Azar,   Specialty: Hospitalist  Discussed pt's hx, disposition, and available diagnostic and imaging results. Reviewed care plans. Consultant will evaluate pt for admission.   Written by Idania Helm ED Scribe, as dictated by Freida Cordova DO.    CRITICAL CARE NOTE:    6:49 PM    IMPENDING DETERIORATION -Metabolic and Renal, CNS    ASSOCIATED RISK FACTORS - Metabolic changes, Dehydration and CNS Decompensation    MANAGEMENT- Bedside Assessment and Supervision of Care    INTERPRETATION - Blood Gases and Blood Pressure    INTERVENTIONS - hemodynamic mngmt, vascular control and Metobolic interventions    CASE REVIEW - Hospitalist and Nursing    TREATMENT RESPONSE -Improved    PERFORMED BY - Self      NOTES   :  I have spent 106 minutes of critical care time involved in lab review, consultations with specialist, family decision- making, bedside attention and documentation. During this entire length of time I was immediately available to the patient . Jacey Velasco, DO    LABORATORY TESTS:  Recent Results (from the past 12 hour(s))   GLUCOSE, POC    Collection Time: 02/21/17  4:07 PM   Result Value Ref Range    Glucose (POC) >600 (HH) 65 - 100 mg/dL    Performed by Vida Urias    GLUCOSE, POC    Collection Time: 02/21/17  4:08 PM   Result Value Ref Range    Glucose (POC) >600 (HH) 65 - 100 mg/dL    Performed by Vida Urias    CBC WITH AUTOMATED DIFF    Collection Time: 02/21/17  4:31 PM   Result Value Ref Range    WBC 28.8 (H) 3.6 - 11.0 K/uL    RBC 4.66 3.80 - 5.20 M/uL    HGB 14.5 11.5 - 16.0 g/dL    HCT 47.5 (H) 35.0 - 47.0 %    .9 (H) 80.0 - 99.0 FL    MCH 31.1 26.0 - 34.0 PG    MCHC 30.5 30.0 - 36.5 g/dL    RDW 15.0 (H) 11.5 - 14.5 %    PLATELET 976 (H) 895 - 400 K/uL    NEUTROPHILS 63 %    BAND NEUTROPHILS 2 %    LYMPHOCYTES 27 %    MONOCYTES 4 %    EOSINOPHILS 0 %    BASOPHILS 1 %    METAMYELOCYTES 1 %    MYELOCYTES 2 %    ABS. NEUTROPHILS 18.7 K/UL    ABS. LYMPHOCYTES 7.8 K/UL    ABS. MONOCYTES 1.2 K/UL    ABS. EOSINOPHILS 0.0 K/UL    ABS.  BASOPHILS 0.3 K/UL    DF MANUAL      RBC COMMENTS ANISOCYTOSIS  1+        RBC COMMENTS MACROCYTOSIS  1+       URINALYSIS W/ REFLEX CULTURE    Collection Time: 02/21/17  4:31 PM   Result Value Ref Range    Color YELLOW/STRAW      Appearance CLOUDY (A) CLEAR      Specific gravity 1.025 1.003 - 1.030      pH (UA) 5.0 5.0 - 8.0      Protein 30 (A) NEG mg/dL    Glucose >1000 (A) NEG mg/dL    Ketone >80 (A) NEG mg/dL    Bilirubin NEGATIVE  NEG Blood SMALL (A) NEG      Urobilinogen 0.2 0.2 - 1.0 EU/dL    Nitrites NEGATIVE  NEG      Leukocyte Esterase NEGATIVE  NEG      WBC 0-4 0 - 4 /hpf    RBC 5-10 0 - 5 /hpf    Epithelial cells FEW FEW /lpf    Bacteria 2+ (A) NEG /hpf    UA:UC IF INDICATED URINE CULTURE ORDERED (A) CNI     HCG URINE, QL    Collection Time: 02/21/17  4:31 PM   Result Value Ref Range    HCG urine, Ql. NEGATIVE  NEG     DRUG SCREEN, URINE    Collection Time: 02/21/17  4:31 PM   Result Value Ref Range    AMPHETAMINE NEGATIVE  NEG      BARBITURATES NEGATIVE  NEG      BENZODIAZEPINE NEGATIVE  NEG      COCAINE NEGATIVE  NEG      METHADONE NEGATIVE  NEG      OPIATES NEGATIVE  NEG      PCP(PHENCYCLIDINE) NEGATIVE  NEG      THC (TH-CANNABINOL) NEGATIVE  NEG      Drug screen comment (NOTE)    VENOUS BLOOD GAS    Collection Time: 02/21/17  4:31 PM   Result Value Ref Range    VENOUS PH 6.82 (LL) 7.32 - 7.42      VENOUS PCO2 18 (L) 41 - 51 mmHg    VENOUS PO2 77 (H) 25 - 40 mmHg    VENOUS O2 SATURATION 81 65 - 88 %    VENOUS BICARBONATE 3 (L) 23 - 28 mmol/L    VENOUS BASE DEFICIT 30.7 mmol/L    O2 METHOD ROOM AIR      Sample source VENOUS      SITE OTHER      Critical value read back Neal Duke MD    LACTIC ACID, PLASMA    Collection Time: 02/21/17  5:21 PM   Result Value Ref Range    Lactic acid 2.3 (HH) 0.4 - 2.0 MMOL/L   METABOLIC PANEL, COMPREHENSIVE    Collection Time: 02/21/17  5:21 PM   Result Value Ref Range    Sodium 138 136 - 145 mmol/L    Potassium 5.4 (H) 3.5 - 5.1 mmol/L    Chloride 100 97 - 108 mmol/L    CO2 <5 (LL) 21 - 32 mmol/L    Anion gap CANNOT BE CALCULATED 5 - 15 mmol/L    Glucose 849 (HH) 65 - 100 mg/dL    BUN 23 (H) 6 - 20 MG/DL    Creatinine 1.59 (H) 0.55 - 1.02 MG/DL    BUN/Creatinine ratio 14 12 - 20      GFR est AA 48 (L) >60 ml/min/1.73m2    GFR est non-AA 40 (L) >60 ml/min/1.73m2    Calcium 8.2 (L) 8.5 - 10.1 MG/DL    Bilirubin, total 0.5 0.2 - 1.0 MG/DL    ALT (SGPT) 29 12 - 78 U/L    AST (SGOT) 35 15 - 37 U/L Alk. phosphatase 166 (H) 45 - 117 U/L    Protein, total 7.2 6.4 - 8.2 g/dL    Albumin 3.3 (L) 3.5 - 5.0 g/dL    Globulin 3.9 2.0 - 4.0 g/dL    A-G Ratio 0.8 (L) 1.1 - 2.2     BLOOD GAS, ARTERIAL    Collection Time: 02/21/17  6:49 PM   Result Value Ref Range    pH 6.79 (LL) 7.35 - 7.45      PCO2 12 (L) 35.0 - 45.0 mmHg    PO2 139 (H) 80 - 100 mmHg    O2 SAT 96 92 - 97 %    BICARBONATE 2 (L) 22 - 26 mmol/L    BASE DEFICIT 32.3 mmol/L    O2 METHOD ROOM AIR      Sample source ARTERIAL      SITE RIGHT RADIAL      DONN'S TEST YES      Critical value read back Jovanna Campa MD        MEDICATIONS GIVEN:  Medications   insulin regular (NOVOLIN R, HUMULIN R) 100 Units in 0.9% sodium chloride 100 mL infusion (10.8 Units/hr IntraVENous New Bag 2/21/17 1816)   insulin lispro (HUMALOG) injection (not administered)   glucose chewable tablet 16 g (not administered)   glucagon (GLUCAGEN) injection 1 mg (not administered)   sodium bicarbonate (8.4%) 150 mEq in dextrose 5% 850 mL infusion (not administered)   sodium chloride 0.9 % bolus infusion 1,000 mL (1,000 mL IntraVENous New Bag 2/21/17 1811)   sodium chloride 0.9 % bolus infusion 1,000 mL (1,000 mL IntraVENous New Bag 2/21/17 1811)       IMPRESSION:  1. Diabetic ketoacidosis with coma associated with diabetes mellitus due to underlying condition [facility])        PLAN: Admit to hospitalist  Admission Note:  6:34 PM  Patient is being admitted to the hospital by Dr. Willis Fisher. The results of their tests and reasons for their admission have been discussed with them and/or available family. They convey agreement and understanding for the need to be admitted and for their admission diagnosis. Written by ALVAREZ Blount, as dictated by Jan Ro DO. Attestation: This note is prepared by Jam Sarah, acting as Scribe for Jan Ro DO.     Jan Ro DO: The scribe's documentation has been prepared under my direction and personally reviewed by me in its entirety. I confirm that the note above accurately reflects all work, treatment, procedures, and medical decision making performed by me.

## 2017-02-22 NOTE — DIABETES MGMT
DTC Progress Note    Recommendations/ Comments: Pt discussed with Chai Christine, patient's nurse. Plan to keep patient on insulin drip until tomorrow-  DTC will reassess tomorrow in rounds. Last Anion Gap: 21. NOTE: per RN, the labwork was drawn on the same arm that the dextrose was infusing, likely effecting false elevation. Chart reviewed on [facility]      A1c:   Lab Results   Component Value Date/Time    Hemoglobin A1c 14.4 02/21/2017 04:31 PM           Recent Glucose Results:   Lab Results   Component Value Date/Time     (HH) 02/22/2017 12:59 PM     (H) 02/22/2017 08:09 AM     (H) 02/22/2017 03:47 AM    GLUCPOC 288 (H) 02/22/2017 02:16 PM    GLUCPOC 248 (H) 02/22/2017 01:09 PM    GLUCPOC 554 (H) 02/22/2017 01:07 PM        Lab Results   Component Value Date/Time    Creatinine 1.09 02/22/2017 12:59 PM       Active Orders   Diet    DIET CLEAR LIQUID        PO intake: No data found. Will continue to follow as needed. Thank you.     Gemma Mack, [address] [place] [address], [address]  Office:  [phone]

## 2017-02-22 NOTE — PROGRESS NOTES
Pressure Ulcer Prevention In basket Alert Received for Jimmy < 14 (moderate risk).      Suggested Care Plan/Interventions for Nursing  1. Complete Jimmy Pressure Ulcer Risk Scale and use sub scores to identify appropriate interventions. 2. Perform Assessment: skin, changes in LOC, visual cues for pain, monitor skin under medical devices  3. Respond to Reduced Sensory Perception: changes in LOC, check visual cues for pain, float heels, suspension boots, pressure redistribution bed/mattress/chair cushion, turning and reposition approximately every 2 hours (pillows & wedges), pad between skin to skin, turn & reposition  4. Manage Moisture: absorbent under pads, internal / external urinary device, internal /  external fecal device, minimize layers, contain wound drainage, access need for specialty bed, limit adult briefs, maintain skin hydration (lotion/cream), moisture barrier, offer toileting every hour  5. Promote Activity: increase time out of bed, chair cushion, PT/OT evaluation, trapeze to reposition, pressure redistribution bed/mattress/chair  6. Address Reduced Mobility: float heels / suspension boot, HOB 30 degrees or less, pressure redistribution bed/mattress/cushion, PT / OT evaluation, turn and reposition approximately every 2 hours (pillows & wedges)  7. Promote Nutrition: document food / fluid / supplement intake, encourage/assist with meals as needed  8. Reduce Friction and Shear: transferring/repositioning devices (lift/draw sheet), lift team/ patient mobility team, feet elevated on foot rest, minimize layers, foam dressing / transparent film / skin sealants, protective barrier creams and emollients, transfer aides (board, Kaylie lift, ceiling lift, stand assist), HOB 30 degrees or less, trapeze to reposition.   Wound Care Team

## 2017-02-22 NOTE — PROGRESS NOTES
Hospitalist Progress Note    NAME: Brandyn Raymundo   :  1990   MRN:  [id number]       Interim Hospital Summary: 23 y.o. female whom presented on 2017 with      Assessment / Plan:  Diabetic Ketoacidosis, T1DM  -likely precipitated by insulin deficiency from poor compliance  -UA neg, cxr neg  -NPO  -continue insulin gtt until gap close, overlap with insulin pump, IVF  -BMP, Mg, PO4 q4h  -prn antiemetics   -endocrine following, appreciate recs  -diabetic education    Acute renal failure, expect to improve with with IVF  Lactic acidosis  -bicarb gtt  -bmp q4h    Acute encephalopathy, improving  -from DKA              Code status:Full  DVT: lovenox  Recommended Disposition: Home w/Family     Subjective:     Chief Complaint / Reason for Physician Visit  Pt awake in bed. States \"i need to take better care of myself\". No acute complaints other than \"sleepy\". Discussed with RN events overnight. Review of Systems:  Symptom Y/N Comments  Symptom Y/N Comments   Fever/Chills n   Chest Pain n    Poor Appetite n   Edema n    Cough n   Abdominal Pain n    Sputum n   Joint Pain n    SOB/PARMAR    Pruritis/Rash     Nausea/vomit    Tolerating PT/OT     Diarrhea    Tolerating Diet     Constipation    Other       Could NOT obtain due to:      Objective:     VITALS:   Last 24hrs VS reviewed since prior progress note.  Most recent are:  Patient Vitals for the past 24 hrs:   Temp Pulse Resp BP SpO2   17 1600 - (!) 114 20 117/68 99 %   17 1411 98.3 °F (36.8 °C) (!) 115 17 115/71 -   17 1316 99.2 °F (37.3 °C) - - 104/55 -   17 1200 - (!) 113 19 115/61 98 %   17 1100 - (!) 116 19 119/60 98 %   17 1000 - (!) 129 19 116/57 98 %   17 0900 97.5 °F (36.4 °C) (!) 126 19 99/49 98 %   17 0800 - (!) 123 23 120/64 98 %   17 0758 98.8 °F (37.1 °C) - - - -   17 0700 - (!) 123 23 104/61 98 %   17 0600 - (!) 125 22 113/53 98 %   17 0500 - (!) 122 22 113/52 98 % 02/22/17 0400 99.9 °F (37.7 °C) (!) 121 22 95/53 99 %   02/22/17 0300 - (!) 120 20 110/59 98 %   02/22/17 0200 - (!) 117 19 107/59 98 %   02/22/17 0100 - (!) 128 21 105/64 97 %   02/22/17 0000 97.6 °F (36.4 °C) (!) 130 19 113/68 98 %   02/21/17 2300 - (!) 127 19 105/66 99 %   02/21/17 2200 97.8 °F (36.6 °C) (!) 120 19 112/69 100 %   02/21/17 2100 - (!) 126 25 113/70 99 %   02/21/17 2045 - (!) 126 22 116/77 100 %   02/21/17 2000 95.8 °F (35.4 °C) (!) 120 23 124/82 100 %   02/21/17 1945 - (!) 124 24 128/89 100 %   02/21/17 1900 - (!) 120 24 128/86 95 %   02/21/17 1815 - (!) 124 20 130/86 98 %   02/21/17 1800 - (!) 113 25 142/74 100 %   02/21/17 1715 - (!) 128 22 137/81 99 %       Intake/Output Summary (Last 24 hours) at 02/22/17 1711  Last data filed at 02/22/17 1411   Gross per 24 hour   Intake          3047.53 ml   Output              200 ml   Net          2847.53 ml        PHYSICAL EXAM:  General: WD, WN. Alert, cooperative, no acute distress    EENT:  EOMI. Anicteric sclerae. MMM  Resp:  CTA bilaterally, no wheezing or rales. No accessory muscle use  CV:  Regular  rhythm,  No edema  GI:  Soft, Non distended, Non tender.  +Bowel sounds  Neurologic:  Alert and oriented X 3, normal speech,   Psych:   Good insight. Not anxious nor agitated  Skin:  No rashes. No jaundice    Reviewed most current lab test results and cultures  YES  Reviewed most current radiology test results   YES  Review and summation of old records today    NO  Reviewed patient's current orders and MAR    YES  PMH/SH reviewed - no change compared to H&P  ________________________________________________________________________  Care Plan discussed with:    Comments   Patient x    Family      RN x    Care Mxanager     Consultant  x                      Multidiciplinary team rounds were held today with , nursing, pharmacist and clinical coordinator.   Patient's plan of care was discussed; medications were reviewed and discharge planning was addressed. ________________________________________________________________________  Total NON critical care TIME:  45   Minutes    Total CRITICAL CARE TIME Spent:   Minutes non procedure based      Comments   >50% of visit spent in counseling and coordination of care x    ________________________________________________________________________  Ellouise Alpers, MD     Procedures: see electronic medical records for all procedures/Xrays and details which were not copied into this note but were reviewed prior to creation of Plan. LABS:  I reviewed today's most current labs and imaging studies.   Pertinent labs include:  Recent Labs      02/22/17   0347  02/21/17   1631   WBC  11.3*  28.8*   HGB  13.2  14.5   HCT  40.1  47.5*   PLT  226  436*     Recent Labs      02/22/17   1259  02/22/17   0809  02/22/17   0347  02/21/17   1721   NA  131*  155*  153*  138   K  3.2*  3.6  3.7  5.4*   CL  99  123*  120*  100   CO2  11*  12*  15*  <5*   GLU  763*  187*  137*  849*   BUN  12  15  18  23*   CREA  1.09*  1.27*  1.06*  1.59*   CA  6.7*  7.5*  7.6*  8.2*   MG  1.6  1.7   --    --    PHOS  1.8*   --   1.9*   --    ALB   --    --   2.9*  3.3*   TBILI   --    --   0.4  0.5   SGOT   --    --   22  35   ALT   --    --   20  29       Signed: Ellouise Alpers, MD

## 2017-02-22 NOTE — PROGRESS NOTES
2130: Received bedside report in the ED from Ioana. Discussed bicarb of 2 on arterial draw. Nurse believed this was a venous draw, however there is a bandage on the right radial artery. Spoke with Dr. Nora Gray and requested amps of bicarb, no orders were received. Patient projectile vomited green colored fluid x3 while receiving report. Ioana RN received orders from ED physician for zofran 4mg, administered. 2145: Arrived to CCU. Bathed. Tolerated well.   2200: Assessment completed. Patient alert to self only stating she was 26. No other words were comprehensible. Withdraws from pain in all extremities, spontaneous movement. Pupils are equal and reactive. Lungs are diminished. Bowel sounds hypoactive. Bicarb gtt and insulin gtt infusing. Setting for insulin gtt verified with [facility]. Primary Nurse Jonathan Mathew and Abdirizak RN performed a dual skin assessment on this patient Impairment noted- see wound doc flow sheet. Ecchymotic skin on all extremities and scars. Abrasions on left outer knee covered with band aid. Reddened area between gluteal folds. Small pinpoint open area on sacrum. Covered with a Mepilex. Patient has piercings on bilateral nipples. Jimmy score is 12  Unable to complete admission database at this time due to patient slurred speech and altered mental status, will continue to assess opportunity to complete, if unable will pass to day shift   0000: Reassessment complete. Patient is more alert and follow commands, however not speaking. . First BG less than 250. D5 is already in maintanence fluids. 0030: Jolly Hall, roommate called. Brief update provided. Wanted to leave his number just in case anything happened. [phone]  [phone]: Hospitalist paged  0134: Hospitalist repaged  0140: Dr. Jacinta Bill returned paged. Updated on maintanence fluids and latest potassium.  Orders received to remove potassium from fluids, making 0.9 NS @150ml/hr  0200: Patient having another episode of emesis x2. Changed gown and linens. Patient fell back asleep, laying on side. 3242: Labs drawn and sent. 5436: Insulin gtt stopped. 0400: Reassessment complete. No changes. 3799: Patient moaning. Unable to speak clearly but nod as I asked questioned. The purewick was causing her discomfort, voiding 200ml via purewick, removed. 1327: Hospitalist paged. 0515: Hospitalist repaged. 0620: Received a direct number from [facility], charge nurse to call the ED. Talked to Dr. Juanis Solomon. She stated she would place orders to d/c NS and give new fluid to run after the goodie bag is complete. She also said she would notify the oncoming shift of doctors and allow them to make any further decisions with upcoming labs. 0700: Bedside and Verbal shift change report given to [facility] (oncoming nurse) by Deangelo Reed (offgoing nurse). Report included the following information SBAR, Kardex, ED Summary and Recent Results.

## 2017-02-22 NOTE — CONSULTS
CONSULTATION REQUESTED BY: Mohsen Tierney MD    REASON FOR CONSULT: DKA in type 1 diabetic    CHIEF COMPLAINT: DKA    HISTORY OF PRESENT ILLNESS:   Billy Gutierrez is a 23 y.o. female with a PMHx as noted below who was admitted to the hospital on 2/21/2017  3:59 PM with a diagnosis of DKA, type 1, not at goal [facility]). Endocrinology was consulted for the evaluation of DKA. Per review of records, patient was previously seen by our very own Dr. Zak Meyer at the diabetes and endocrinology center back on 1/23/15 and has not followed up since then. She notes that she has been following at another clinic which I am not familiar with. Previously she had been on an insulin pump but reports she had some financial/insurance issues which resulted in her switching to multiple daily injections. Her HbA1c is 14.4%    Home regimen: She describes taking Humulin N injections at 40 units twice per day, and Humalog per sliding scale starting at 20 units + 1:30>15 correction. She seems a little bit hesitant when recalling her home regimen and so we will need to revisit this information again when she is feeling a bit better. She describes that she was suddenly nauseous with vomiting at home so she was brought to the ED. Of note, she was reportedly recently admitted to the [facility] for a similar admission 2 weeks ago. On admission here, her WBC is elevated and she is noted for a yeast infection. Additionally when asked about her adherence to her insulin, she reports that she needs to \"stop screwing up\", noting that she has not been taking her insulin as regularly as she should. Review of admission labs reveals the following:  Glucose 849  Bicarb <5 w/ severe metabolic acidosis  + Anion Gap  GFR in the 40's, usually >100 at baseline, severe dehydration  Plenty of ketones in the urine    Currently she is on the insulin drip. Was at 0.6 U/hr however has been off for the past 2 hours.  I checked to see that she is on dextrose and she is in fact on D5W at 150cc/hr. She has not had much of an appetite although there is a 1:15 carb ratio humalog order along with a correction sliding scale ordered to cover that if she does start eating. The patient admits to some underlying depression for which she has a doctor who evaluates her. She denies any abuse or other social issues at home. PAST MEDICAL/SURGICAL HISTORY:   Past Medical History:   Diagnosis Date    [facility] 2017    Bipolar disorder ([facility]     Contraception management     Gyn: Dr. Ava Dukes;  Nexplanon placed  and removed 7/6/15 because she wanted to conceive    Depression     Diabetes in pregnancy     Diabetes type I ([facility]     Age 9; on insulin pump    HX OTHER MEDICAL     late pnc at 18 weeks    HX OTHER MEDICAL     polyhydramnios; previous pregnancy    Unspecified epilepsy without mention of intractable epilepsy ([facility]     diabetic related     Past Surgical History:   Procedure Laterality Date     DELIVERY ONLY  , 14    2 cesareans       ALLERGIES:   Allergies   Allergen Reactions    Other Medication Rash     Tegaderm    Sulfa (Sulfonamide Antibiotics) Hives       MEDICATIONS ON ADMISSION:     Current Facility-Administered Medications:     dextrose (D50W) injection syrg 12.5-25 g, 12.5-25 g, IntraVENous, PRN, Ana Garcia MD    acetaminophen (TYLENOL) suppository 650 mg, 650 mg, Rectal, Q4H PRN, Ana Garcia MD    diphenhydrAMINE (BENADRYL) injection 12.5 mg, 12.5 mg, IntraVENous, Q4H PRN, Ana Garcia MD    ondansetron [facility]) injection 4 mg, 4 mg, IntraVENous, Q4H PRN, Ana Garcia MD    insulin lispro (HUMALOG) injection, , SubCUTAneous, Q6H, Ana Garcia MD, Stopped at 17 0100    dextrose 5% infusion, 150 mL/hr, IntraVENous, CONTINUOUS, Clint Cage MD, Last Rate: 150 mL/hr at 17 0955, 150 mL/hr at 17 0955    pantoprazole (PROTONIX) 40 mg in sodium chloride 0.9 % 10 mL injection, 40 mg, IntraVENous, DAILY, Ca Escobar MD, 40 mg at 02/22/17 2880    enoxaparin (LOVENOX) injection 30 mg, 30 mg, SubCUTAneous, Q24H, Ca Escobar MD, 30 mg at 02/22/17 5675    miconazole (MICOTIN) 2 % vaginal cream 1 Applicator, 1 Applicator, Vaginal, QPM, Kannan Posey MD    lamoTRIgine (LaMICtal) tablet 200 mg, 200 mg, Oral, Q12H, Ca Escobar MD, 200 mg at 02/22/17 1343    insulin regular (NOVOLIN R, HUMULIN R) 100 Units in 0.9% sodium chloride 100 mL infusion, 0-50 Units/hr, IntraVENous, TITRATE, Lin Starkey DO, Last Rate: 2.3 mL/hr at 02/22/17 1426, 2.3 Units/hr at 02/22/17 1426    insulin lispro (HUMALOG) injection, , SubCUTAneous, TIDAC, Regina Bolden DO, Stopped at 02/22/17 0730    glucose chewable tablet 16 g, 4 Tab, Oral, PRN, Lin Starkey,     glucagon (GLUCAGEN) injection 1 mg, 1 mg, IntraMUSCular, PRN, Lin Starkey,     mupirocin (Al Mattock) 2 % ointment, , Both Nostrils, BID, Vi Freire MD    SOCIAL HISTORY:   Social History     Social History    Marital status: SINGLE     Spouse name: N/A    Number of children: N/A    Years of education: N/A     Occupational History    Not on file.      Social History Main Topics    Smoking status: Current Every Day Smoker     Packs/day: 0.50     Years: 7.00     Types: Cigarettes    Smokeless tobacco: Never Used    Alcohol use No    Drug use: No    Sexual activity: Yes     Partners: Male     Birth control/ protection: Pill     Other Topics Concern    Not on file     Social History Narrative       FAMILY HISTORY:  Family History   Problem Relation Age of Onset    Hypertension Maternal Grandmother     High Cholesterol Maternal Grandmother     Hypertension Maternal Grandfather     High Cholesterol Maternal Grandfather     No Known Problems Mother     No Known Problems Father     Cancer Paternal Grandfather     Other Sister      stomach issues/gluten-lactose sensitive       REVIEW OF SYSTEMS: Complete ROS assessed and noted for that which is described above, all else are negative. Eyes: normal  ENT: normal  CVS: normal  Resp: normal  GI: Nausea/vomiting  : normal  GYN: normal  Endocrine: fatigue  Integument: normal  Musculoskeletal: normal  Neuro: normal  Psych: normal      PHYSICAL EXAMINATION:    VITAL SIGNS:  Visit Vitals    /71 (BP 1 Location: Right arm, BP Patient Position: At rest)    Pulse (!) 115    Temp 98.3 °F (36.8 °C)    Resp 17    Ht 5' 2\" (1.575 m)    Wt 133 lb 9.6 oz (60.6 kg)    SpO2 98%    BMI 24.44 kg/m2       GENERAL: NCAT, laying comfortably, NAD  EYES: EOMI, non-icteric, no proptosis  Ear/Nose/Throat: NCAT, no inflammation, no masses  LYMPH NODES: No LAD  CARDIOVASCULAR: S1 S2, RRR, No murmur, 2+ radial pulses  RESPIRATORY: CTA b/l, no wheeze/rales  GASTROINTESTINAL: NT, ND,  MUSCULOSKELETAL: Normal ROM, no atrophy  SKIN: warm, no edema/rash/ or other skin changes  NEUROLOGIC: 5/5 power all extremities, no tremors, AAOx3  PSYCHIATRIC: Normal affect, Normal insight and judgement         REVIEW OF LABORATORY AND RADIOLOGY DATA:   Labs and documentation have been reviewed as described above. ASSESSMENT AND PLAN:   Corona Moore is a 23 y.o. female with a PMHx as noted below who was admitted to the hospital on 2/21/2017  3:59 PM with a diagnosis of DKA, type 1, not at goal [facility]). Endocrinology was consulted for the evaluation of DKA in uncontrolled type 1 diabetes. Diagnoses:  Uncontrolled type 1 diabetes  Diabetic Ketoacidosis    Assessment:  DKA in uncontrolled type 1 diabetes likely 2/2 medication non-adherence and underlying infection. Plan:  Adequate rehydration  Continued insulin gtt + dextrose, must continue receiving insulin  Routine ICU monitoring of electrolytes, replete as appropriate  I would recommend continued insulin infusion even 6-8 hours after anion gap is closed.    Patient was prev admitted 2 weeks at outside hospital, and is high risk for readmission, best to assure her metabolic processes have been stable in the anabolic direction, otherwise at high risk of recurrent ketosis soon after transition. Agree with carb coverage 1:15 ratio trial, otherwise may need to tighten based on response. Best regimen to transition to will be evaluated. Thank you, will continue to follow along,    Tay lCemons.  [facility]

## 2017-02-22 NOTE — PROGRESS NOTES
2/22/2017  7:39 AM    INTENSIVIST PROGRESS NOTE:     Patient seen and evaluated   24 yo female admitted DKA  Started on IVF, bicarb, insulin drip  Resting comfortable  C/O N/V     Visit Vitals    /61    Pulse (!) 123    Temp 99.9 °F (37.7 °C)    Resp 23    Ht 5' 2\" (1.575 m)    Wt 60.6 kg (133 lb 9.6 oz)    SpO2 98%    BMI 24.44 kg/m2       General: no distress  CV: RRR  Lungs: clear    Labs reviewed    A/P:  - DKA: IVF, insulin drip  - acute renal failure: renal fx improved with IVF  - PUD/DVT prophylaxis  - NPO  - Will assist on disposition planning   Elizabeth Gilman MD

## 2017-02-22 NOTE — PROGRESS NOTES
Pharmacy Medication Reconciliation     The patient was interviewed regarding current PTA medication list, use and drug allergies. The patient was questioned regarding use of any other inhalers, topical products, over the counter medications, herbal medications, vitamin products or ophthalmic/nasal/otic medication use. Recommendations/Findings: The following amendments were made to the patient's active medication list on file at [facility]:     1) Changes: lamotrigine  mg daily to lamotrigine 200 mg IR PO twice daily. 2) Deletions: atorvastatin 20 mg daily (self-discontinuation, no reason provided)    3) Patient currently not compliant with insulin due to insurance complications.     -Clarified PTA med list with patient and [address] in [place] ([phone]). PTA medication list was corrected to the following:     Prior to Admission Medications   Prescriptions Last Dose Informant Patient Reported? Taking? INSULIN PUMP CARTRIDGE SC   Yes No   Sig: by SubCUTAneous route. Basal rate 1.0   Insulin Pump Cartridge crtg   No No   Sig: Decrease basal rate to 0.8   insulin lispro (HUMALOG) 100 unit/mL injection Not Taking at Unknown time  No No   Sig: For use in pump. Max 100 units/day. (Novolog not on formulary)   insulin syringe-needle U-100 0.3 mL 31 gauge x 15\" syrg   No No   Si Each by SubCUTAneous route five (5) times daily. Follow-up needed for refills   lamoTRIgine (LAMICTAL) 200 mg tablet 2017 at Unknown time  Yes Yes   Sig: Take 200 mg by mouth two (2) times a day.       Facility-Administered Medications: None     Thank you,  Simon Ga  PharmD Candidate 2017    Angeles Miller, PharmD

## 2017-02-22 NOTE — PROGRESS NOTES
80  Spoke with Dr. Al Morales regarding pt having white vaginal discharge and zachary area appears irritated. Orders received for yeast treatment  1310  3 rings removed from pt's fingers due to swelling. Rings placed in bag and placed in pt's purse  1630  Pt more alert and states she is hungry. Discussed with Dr. Al Morales. Diet ordered. L PIV removed due to infiltration  1850  Spoke with Dr. Jorje Freire regarding lab results. Orders received.   Pt to stay on D5W and receive PO potassium

## 2017-02-23 NOTE — PROGRESS NOTES
Bedside shift change report given to CARLITO Erickson (oncoming nurse) by STAN Juárez (offgoing nurse). Report included the following information SBAR, Kardex, Intake/Output, MAR, Accordion, Recent Results and Cardiac Rhythm NSR.

## 2017-02-23 NOTE — DIABETES MGMT
DTC Progress Note    Recommendations/ Comments: Pt discussed with rounding team and Dr. Martine Maza. Plan to transition pt off insulin gtt to NPH 15units Q12hrs and lispro 5units ac tid plus correction. DTC will f/u with pt for education. Chart reviewed on Kyrie Waite during Multidisciplinary Rounds. A1c:   Lab Results   Component Value Date/Time    Hemoglobin A1c 14.4 02/21/2017 04:31 PM           Recent Glucose Results: Lab Results   Component Value Date/Time     (H) 02/23/2017 04:12 AM     (H) 02/22/2017 10:37 PM     (H) 02/22/2017 04:58 PM    GLUCPOC 170 (H) 02/23/2017 09:17 AM    GLUCPOC 211 (H) 02/23/2017 08:13 AM    GLUCPOC 297 (H) 02/23/2017 07:00 AM        Lab Results   Component Value Date/Time    Creatinine 1.04 02/23/2017 04:12 AM       Active Orders   Diet    DIET DIABETIC CONSISTENT CARB Regular        PO intake: Patient Vitals for the past 72 hrs:   % Diet Eaten   02/22/17 2000 40 %       Will continue to follow as needed. Thank you.   Brooklynn Davis RN, [address]

## 2017-02-23 NOTE — DIABETES MGMT
DTC Consult Note    Recommendations/ Comments: Pt to return to her home regimen when discharged. See note from CCU rounds for DM management during admission. Consult received for:  [x]             Assessment of home management                []      Medication Recommendations                []             Meter/monitoring     []             Insulin instruction     []             New diagnosis     []             Outpatient education     []             Insulin pump patient     []             Insulin infusion     [x]             DKA/HHS    Chart reviewed and initial evaluation complete on [address]. Visited with pt, well known to educator from past outpatient education with her insulin pump therapy. Pt has been seeing Torsten Bal for her DM management until late summer 2016. Patient is a 23 y.o. female with known Type 1 on NPH 20 units bid and Regular insulin 1 unit:30 g and a correction scale when high. Pt shared that she lost her Medicare coverage in January and has been without her insulin pump since then. She readily admits that her control while on her pump \"was not great\" - but, also determined that she was filling reservoir with 200 units Novolog and using for 7 days - was being exposed to degrading insulin for > 4 days most set changes. Pt shared that she has accessed DM care at [facility] in [place] when she had no insurance. She shared that she has adequate testing supplies for her Contour meter and syringes. She is not consistently dosing her evening dose of NPH and will often not use her Regular insulin as she is grazing and not eating a full meal.   Pt shared that she is only testing fasting and prior to supper, she set a phone reminder while educator was visiting to help her to test midday. Reports fasting BG levels were consistently above 200 mg/dl - self concluded that this was likely due to her missed insulin doses.     Assessed and instructed patient on the following:   ·  blood sugar goals, insulin adjustments, illness management, SMBG skills, nutrition, referred to Diabetes Educator and insulin actions of her NPH/Regular versus her     Encouraged the following:   · regular blood sugar monitoring: 3-4 times daily, not missing insulin injections - especially her pm Regular and NPH. Provided patient with the following: [x]             Survival skills education materials               [x]             Insulin education materials               []             CHO counting education materials               [x]             Outpatient DTC contact number               []             Glucometer               Discussed with patient need for follow up appointment for diabetes management after discharge when insurance coverage has been determined. Strongly encouraged her to reconnect with [facility] also. Pt was also encouraged to follow through with dental services wait list that she has signed up for with Cristhian JACOBO A1c:   Lab Results   Component Value Date/Time    Hemoglobin A1c 14.4 02/21/2017 04:31 PM       Recent Glucose Results: Lab Results   Component Value Date/Time     (H) 02/23/2017 04:12 AM     (H) 02/22/2017 10:37 PM     (H) 02/22/2017 04:58 PM    GLUCPOC 185 (H) 02/23/2017 10:19 AM    GLUCPOC 170 (H) 02/23/2017 09:17 AM    GLUCPOC 211 (H) 02/23/2017 08:13 AM        Lab Results   Component Value Date/Time    Creatinine 1.04 02/23/2017 04:12 AM       Active Orders   Diet    DIET DIABETIC CONSISTENT CARB Regular        PO intake: Patient Vitals for the past 72 hrs:   % Diet Eaten   02/22/17 2000 40 %       Current hospital DM medication: gtt - due for transition and will be starting Lispro 5 units ac and correctional insulin. Will continue to follow as needed. Thank you.   Inna Watson RD CDE

## 2017-02-23 NOTE — PROGRESS NOTES
Pt is a 22 yo female admitted from home for treatment of DKA. Pt is a Type 1 diabetic, diagnosed when she was 89 years old. She has an insulin pump which was working fairly well for her but then lost her Medicaid in 2017 - re-verification documents not submitted on time. Pt was hospitalized [facility] earlier this month but left AMA. Pt went to [facility] the [facility] in [place] and was provided insulin/supplies for her pump but she ran out and did not make her next appmt in Feb. Pt admits that she does not always eat well. Pt lives with her boyfriend and 3 yo son and and is completely independent. She seems to understand her disease well but admits that even as long as she has been diabetic, she may be in denial about it. Diabetic Treatment team met w/ pt and provided education, supplies - pt has enough insulin to get through several days post dc. [facility] will see pt to see if Medicaid can be reinstated and provide Care Card application. Pt will need supplies and MD care until either Medicaid reinstated or CC is approved (can take up to 90 days for CC). Pt is willing to go to [facility] the [facility] next Wed 3/1. Care Management Interventions  PCP Verified by CM: No (Nicky Javed but no insur currently)  Mode of Transport at Discharge:  Other (see comment) (family)  Transition of Care Consult (CM Consult): Discharge Planning (Pt was assessed for possibl dc needs)  Discharge Durable Medical Equipment: No  Physical Therapy Consult: No  Occupational Therapy Consult: No  Speech Therapy Consult: No  Current Support Network: Own Home  Confirm Follow Up Transport: Self  Plan discussed with Pt/Family/Caregiver: Yes  Freedom of Choice Offered: Yes  Discharge Location  Discharge Placement: Home with outpatient services     ARIEL Xavier

## 2017-02-23 NOTE — PROGRESS NOTES
5969- Patient OOB to the chair with one person asi. Patient performed self care/bath. Linens and gown changed. 1016-NPH insulin given. Drip to be stopped @ 12:15    1135- Diabetes educator in to Ruby patient. 1215- Insulin drip and D5 infusion stopped per MD order. 1255- Patient ate 75% of lunch. 5 units lispro given before per order. 1300- Patient up ad torrey in the room. Gait steady. 1500- Case management in to see patient.

## 2017-02-23 NOTE — PROGRESS NOTES
Attended Interdisciplinary rounds in Critical Care Unit, where patient care was discussed. Visit by: Lucian Borrero. eTsfaye Brice.  Lucinda Manjarrez MA, [address]

## 2017-02-23 NOTE — PROGRESS NOTES
Chart Review    Diabetes Treatment Center recommendations reviewed. Patient discharge planning reviewed. Monitor response to NPH and lispro following transition. Note that patient remains high risk for re-admission. Patient reported that she is not interested in returning to see Dr. Mendez Egan at the endocrine clinic. She will need to be sure she follows with a provider following her discharge to avoid slipping back in to DKA. Psychiatric conditions are a strong component of her diabetes self-management and she should have f/u for this too. Feel free to contact me with any questions or concerns,    Tamar Robles.  [facility]  [address] [place] [address]

## 2017-02-23 NOTE — PROGRESS NOTES
Insulin drip remains infusing. Patient Alert and oriented X4.   0700- Report to [facility].

## 2017-02-23 NOTE — PROGRESS NOTES
Hospitalist Progress Note    NAME: Buddy Balderas   :  1990   MRN:  [id number]       Interim Hospital Summary: 23 y.o. female whom presented on 2017 with      Assessment / Plan:  Diabetic Ketoacidosis, T1DM  -likely precipitated by insulin deficiency from poor compliance  -UA neg, cxr neg  -insulin gtt off, transitioned to subq insulin with SSI, diabetic diet  -prn antiemetics   -endocrine following, appreciate recs-needs f/u outpt with Dr Teofilo Martell  -diabetic education    Acute renal failure,, improved  Lactic acidosis, resolved  -bicarb gtt turned off    Acute encephalopathy, resolved  -from DKA          Code status:Full  DVT: lovenox  Recommended Disposition: Home w/Family     Subjective:     Chief Complaint / Reason for Physician Visit  Pt awake in bed. No acute complaints. No acute complaints  Discussed with RN events overnight. Review of Systems:  Symptom Y/N Comments  Symptom Y/N Comments   Fever/Chills n   Chest Pain n    Poor Appetite n   Edema n    Cough n   Abdominal Pain n    Sputum n   Joint Pain n    SOB/PARMAR    Pruritis/Rash     Nausea/vomit    Tolerating PT/OT     Diarrhea    Tolerating Diet     Constipation    Other       Could NOT obtain due to:      Objective:     VITALS:   Last 24hrs VS reviewed since prior progress note.  Most recent are:  Patient Vitals for the past 24 hrs:   Temp Pulse Resp BP SpO2   17 1612 98.5 °F (36.9 °C) 95 - 116/67 -   17 1155 - - - - 93 %   17 1154 98.4 °F (36.9 °C) - - 100/64 94 %   17 0700 - 96 20 101/67 99 %   17 0600 - (!) 101 17 112/64 99 %   17 0500 - 94 17 117/74 100 %   17 0400 98.1 °F (36.7 °C) 89 14 109/67 98 %   17 0300 - 97 23 112/59 98 %   17 0200 - 97 19 111/63 98 %   17 0100 - 93 21 118/69 100 %   17 0000 97.9 °F (36.6 °C) 98 15 120/67 98 %   17 2300 - (!) 103 19 106/65 99 %   17 2200 - (!) 107 19 108/82 99 %   17 2100 - (!) 105 26 132/82 99 % 02/22/17 2000 97.9 °F (36.6 °C) 79 16 120/76 100 %   02/22/17 1900 - (!) 101 18 115/75 98 %       Intake/Output Summary (Last 24 hours) at 02/23/17 1709  Last data filed at 02/23/17 1651   Gross per 24 hour   Intake          3695.48 ml   Output             1575 ml   Net          2120.48 ml        PHYSICAL EXAM:  General: WD, WN. Alert, cooperative, no acute distress    EENT:  EOMI. Anicteric sclerae. MMM  Resp:  CTA bilaterally, no wheezing or rales. No accessory muscle use  CV:  Regular  rhythm,  No edema  GI:  Soft, Non distended, Non tender.  +Bowel sounds  Neurologic:  Alert and oriented X 3, normal speech,   Psych:   Good insight. Not anxious nor agitated  Skin:  No rashes. No jaundice    Reviewed most current lab test results and cultures  YES  Reviewed most current radiology test results   YES  Review and summation of old records today    NO  Reviewed patient's current orders and MAR    YES  PMH/ reviewed - no change compared to H&P  ________________________________________________________________________  Care Plan discussed with:    Comments   Patient x    Family      RN x    Care Mxanager     Consultant  x                      Multidiciplinary team rounds were held today with , nursing, pharmacist and clinical coordinator. Patient's plan of care was discussed; medications were reviewed and discharge planning was addressed. ________________________________________________________________________  Total NON critical care TIME:  35   Minutes    Total CRITICAL CARE TIME Spent:   Minutes non procedure based      Comments   >50% of visit spent in counseling and coordination of care x    ________________________________________________________________________  Gisella Ramesh MD     Procedures: see electronic medical records for all procedures/Xrays and details which were not copied into this note but were reviewed prior to creation of Plan.       LABS:  I reviewed today's most current labs and imaging studies.   Pertinent labs include:  Recent Labs      02/23/17   0412  02/22/17   0347  02/21/17   1631   WBC  6.8  11.3*  28.8*   HGB  11.9  13.2  14.5   HCT  34.8*  40.1  47.5*   PLT  181  226  436*     Recent Labs      02/23/17   0412  02/22/17   2237  02/22/17   1658  02/22/17   1259  02/22/17   0809  02/22/17   0347  02/21/17   1721   NA  144  141  144  131*  155*  153*  138   K  3.3*  3.1*  3.0*  3.2*  3.6  3.7  5.4*   CL  112*  110*  111*  99  123*  120*  100   CO2  21  22  14*  11*  12*  15*  <5*   GLU  167*  202*  232*  763*  187*  137*  849*   BUN  7  9  12  12  15  18  23*   CREA  1.04*  1.03*  1.32*  1.09*  1.27*  1.06*  1.59*   CA  8.6  8.2*  7.8*  6.7*  7.5*  7.6*  8.2*   MG   --   1.5*   --   1.6  1.7   --    --    PHOS   --   1.2*   --   1.8*   --   1.9*   --    ALB  2.6*   --    --    --    --   2.9*  3.3*   TBILI  0.4   --    --    --    --   0.4  0.5   SGOT  16   --    --    --    --   22  35   ALT  16   --    --    --    --   20  29       Signed: Layla Cage MD

## 2017-02-23 NOTE — PROGRESS NOTES
2/23/2017  7:39 AM    INTENSIVIST PROGRESS NOTE:     Patient seen and evaluated   24 yo female admitted DKA  Started on IVF, bicarb, insulin drip  Resting comfortable  C/O N/V     Visit Vitals    /67    Pulse 96    Temp 98.1 °F (36.7 °C)    Resp 20    Ht 5' 2\" (1.575 m)    Wt 60.6 kg (133 lb 9.6 oz)    SpO2 99%    BMI 24.44 kg/m2       General: no distress  CV: RRR  Lungs: clear    Labs reviewed    A/P:  - DKA: IVF, insulin drip, transition to SSI  - acute renal failure: renal fx improved with IVF  - PUD/DVT prophylaxis  - advance diet  - mobilize  - Will assist on disposition planning, transfer to floor today  Carri Sue MD

## 2017-02-23 NOTE — CONSULTS
Psychiatry Consult Note    Subjective:   Patient:  Ramiro Magdaleno Age:  23 y.o. :  1990  Date of Evaluation:  2017    Reason for Referral:  Ramiro Magdaleno was admitted for DKA, evaluation for hx of bipolar disorder, non compliance    History of Presenting Problem: pt seen in her room. She appear tired but managed to talk briefly of her mental health. She states that she has been f/u with Psychiatrist outside for bipolar disorder and takes Lamictal for her sx including her history of seizure. She has mood swings but currently stable. She denies agnes or psychosis. She denies SI/HI/AVH. She is anxious and depressed due to her current physical sx but does not think she need any change in her medication. She became irritable and did not want to answer stating i want bed change now, i don't want to talk any further\". Pt states that she plan to ct taking her med for DM.       Social History:   Social History     Social History    Marital status: SINGLE     Spouse name: N/A    Number of children: N/A    Years of education: N/A     Social History Main Topics    Smoking status: Current Every Day Smoker     Packs/day: 0.50     Years: 7.00     Types: Cigarettes    Smokeless tobacco: Never Used    Alcohol use No    Drug use: No    Sexual activity: Yes     Partners: Male     Birth control/ protection: Pill     Other Topics Concern    None     Social History Narrative       Legal: none    Family History:     Family History   Problem Relation Age of Onset    Hypertension Maternal Grandmother     High Cholesterol Maternal Grandmother     Hypertension Maternal Grandfather     High Cholesterol Maternal Grandfather     No Known Problems Mother     No Known Problems Father     Cancer Paternal Grandfather     Other Sister      stomach issues/gluten-lactose sensitive         Medical History:   Past Medical History:   Diagnosis Date    [facility]) [facility] [address] 2017    Bipolar disorder ([facility] [address]  Contraception management     Gyn: Dr. Christina Ng;  Nexplanon placed 8/13 and removed 7/6/15 because she wanted to conceive    Depression     Diabetes in pregnancy     Diabetes type I ([facility] [address]     Age 9; on insulin pump    HX OTHER MEDICAL     late pnc at 25 weeks    HX OTHER MEDICAL     polyhydramnios; previous pregnancy    Unspecified epilepsy without mention of intractable epilepsy ([facility] [address]     diabetic related       Psychiatric History: bipolar disorder per report, does not want to talk further      Substance Use History:   History   Alcohol Use No     History   Drug Use No           Objective:     Vitals/Physical Assessment:        Patient Vitals for the past 8 hrs:   BP Temp Pulse Resp SpO2   02/22/17 2000 - 97.9 °F (36.6 °C) - - -   02/22/17 1700 111/77 - (!) 110 19 100 %   02/22/17 1600 117/68 98.6 °F (37 °C) (!) 114 20 99 %   02/22/17 1500 107/71 - (!) 107 21 99 %   02/22/17 1415 115/71 - (!) 102 18 100 %   02/22/17 1411 115/71 98.3 °F (36.8 °C) (!) 115 17 -   02/22/17 1316 104/55 99.2 °F (37.3 °C) - - -   02/22/17 1300 104/55 - (!) 121 22 97 %           MENTAL STATUS EXAM:   FINDINGS WITHIN NORMAL LIMITS (WNL) UNLESS OTHERWISE STATED BELOW:    Sensorium  lethargic   Relations passive   Appearance:  age appropriate and disheveled   Motor Behavior:  within normal limits   Speech:  monotone   Thought Process: logical   Thought Content free of delusions and free of hallucinations   Suicidal ideations no plan , no intention and contracts for safety   Homicidal ideations no plan , no intention and contracts for safety   Mood:  sad   Affect:  anxious and mood-congruent   Memory recent  adequate   Memory remote:  adequate   Concentration:  adequate   Abstraction:  abstract   Insight:  fair   Reliability poor   Judgment:  fair          Pertinant Data:  Patient Vitals for the past 8 hrs:   Temp Pulse Resp BP SpO2   02/22/17 2000 97.9 °F (36.6 °C) - - - -   02/22/17 1700 - (!) 110 19 111/77 100 %   02/22/17 1600 98.6 °F (37 °C) (!) 114 20 117/68 99 %   02/22/17 1500 - (!) 107 21 107/71 99 %   02/22/17 1415 - (!) 102 18 115/71 100 %   02/22/17 1411 98.3 °F (36.8 °C) (!) 115 17 115/71 -   02/22/17 1316 99.2 °F (37.3 °C) - - 104/55 -   02/22/17 1300 - (!) 121 22 104/55 97 %       Recent Results (from the past 24 hour(s))   GLUCOSE, POC    Collection Time: 02/21/17 10:06 PM   Result Value Ref Range    Glucose (POC) 382 (H) 65 - 100 mg/dL    Performed by Emperatriz Begum , POC    Collection Time: 02/21/17 11:04 PM   Result Value Ref Range    Glucose (POC) 271 (H) 65 - 100 mg/dL    Performed by Emperatriz Begum , POC    Collection Time: 02/22/17 12:01 AM   Result Value Ref Range    Glucose (POC) 185 (H) 65 - 100 mg/dL    Performed by Emperatriz Begum , POC    Collection Time: 02/22/17  1:03 AM   Result Value Ref Range    Glucose (POC) 136 (H) 65 - 100 mg/dL    Performed by Emperatriz Herbert, POC    Collection Time: 02/22/17  1:58 AM   Result Value Ref Range    Glucose (POC) 117 (H) 65 - 100 mg/dL    Performed by Apple Melara    GLUCOSE, POC    Collection Time: 02/22/17  2:57 AM   Result Value Ref Range    Glucose (POC) 129 (H) 65 - 100 mg/dL    Performed by Apple Melara    CBC W/O DIFF    Collection Time: 02/22/17  3:47 AM   Result Value Ref Range    WBC 11.3 (H) 3.6 - 11.0 K/uL    RBC 4.28 3.80 - 5.20 M/uL    HGB 13.2 11.5 - 16.0 g/dL    HCT 40.1 35.0 - 47.0 %    MCV 93.7 80.0 - 99.0 FL    MCH 30.8 26.0 - 34.0 PG    MCHC 32.9 30.0 - 36.5 g/dL    RDW 14.6 (H) 11.5 - 14.5 %    PLATELET 724 933 - 072 K/uL   METABOLIC PANEL, COMPREHENSIVE    Collection Time: 02/22/17  3:47 AM   Result Value Ref Range    Sodium 153 (H) 136 - 145 mmol/L    Potassium 3.7 3.5 - 5.1 mmol/L    Chloride 120 (H) 97 - 108 mmol/L    CO2 15 (LL) 21 - 32 mmol/L    Anion gap 18 (H) 5 - 15 mmol/L    Glucose 137 (H) 65 - 100 mg/dL    BUN 18 6 - 20 MG/DL    Creatinine 1.06 (H) 0.55 - 1.02 MG/DL    BUN/Creatinine ratio 17 12 - 20      GFR est AA >60 >60 ml/min/1.73m2    GFR est non-AA >60 >60 ml/min/1.73m2    Calcium 7.6 (L) 8.5 - 10.1 MG/DL    Bilirubin, total 0.4 0.2 - 1.0 MG/DL    ALT (SGPT) 20 12 - 78 U/L    AST (SGOT) 22 15 - 37 U/L    Alk.  phosphatase 114 45 - 117 U/L    Protein, total 6.4 6.4 - 8.2 g/dL    Albumin 2.9 (L) 3.5 - 5.0 g/dL    Globulin 3.5 2.0 - 4.0 g/dL    A-G Ratio 0.8 (L) 1.1 - 2.2     PHOSPHORUS    Collection Time: 02/22/17  3:47 AM   Result Value Ref Range    Phosphorus 1.9 (L) 2.6 - 4.7 MG/DL   GLUCOSE, POC    Collection Time: 02/22/17  3:48 AM   Result Value Ref Range    Glucose (POC) 129 (H) 65 - 100 mg/dL    Performed by Jimenez Peguero    GLUCOSE, POC    Collection Time: 02/22/17  4:57 AM   Result Value Ref Range    Glucose (POC) 207 (H) 65 - 100 mg/dL    Performed by Emperatriz Begum 46, POC    Collection Time: 02/22/17  5:59 AM   Result Value Ref Range    Glucose (POC) 300 (H) 65 - 100 mg/dL    Performed by Hershel Rinne    GLUCOSE, POC    Collection Time: 02/22/17  7:04 AM   Result Value Ref Range    Glucose (POC) 285 (H) 65 - 100 mg/dL    Performed by Hershel Rinne    METABOLIC PANEL, BASIC    Collection Time: 02/22/17  8:09 AM   Result Value Ref Range    Sodium 155 (H) 136 - 145 mmol/L    Potassium 3.6 3.5 - 5.1 mmol/L    Chloride 123 (H) 97 - 108 mmol/L    CO2 12 (LL) 21 - 32 mmol/L    Anion gap 20 (H) 5 - 15 mmol/L    Glucose 187 (H) 65 - 100 mg/dL    BUN 15 6 - 20 MG/DL    Creatinine 1.27 (H) 0.55 - 1.02 MG/DL    BUN/Creatinine ratio 12 12 - 20      GFR est AA >60 >60 ml/min/1.73m2    GFR est non-AA 51 (L) >60 ml/min/1.73m2    Calcium 7.5 (L) 8.5 - 10.1 MG/DL   MAGNESIUM    Collection Time: 02/22/17  8:09 AM   Result Value Ref Range    Magnesium 1.7 1.6 - 2.4 mg/dL   GLUCOSE, POC    Collection Time: 02/22/17  9:47 AM   Result Value Ref Range    Glucose (POC) 115 (H) 65 - 100 mg/dL    Performed by Samm Marie, POC    Collection Time: 02/22/17 11:46 AM   Result Value Ref Range Glucose (POC) 137 (H) 65 - 100 mg/dL    Performed by Salas Miller, BASIC    Collection Time: 02/22/17 12:59 PM   Result Value Ref Range    Sodium 131 (L) 136 - 145 mmol/L    Potassium 3.2 (L) 3.5 - 5.1 mmol/L    Chloride 99 97 - 108 mmol/L    CO2 11 (LL) 21 - 32 mmol/L    Anion gap 21 (H) 5 - 15 mmol/L    Glucose 763 (HH) 65 - 100 mg/dL    BUN 12 6 - 20 MG/DL    Creatinine 1.09 (H) 0.55 - 1.02 MG/DL    BUN/Creatinine ratio 11 (L) 12 - 20      GFR est AA >60 >60 ml/min/1.73m2    GFR est non-AA >60 >60 ml/min/1.73m2    Calcium 6.7 (L) 8.5 - 10.1 MG/DL   MAGNESIUM    Collection Time: 02/22/17 12:59 PM   Result Value Ref Range    Magnesium 1.6 1.6 - 2.4 mg/dL   PHOSPHORUS    Collection Time: 02/22/17 12:59 PM   Result Value Ref Range    Phosphorus 1.8 (L) 2.6 - 4.7 MG/DL   GLUCOSE, POC    Collection Time: 02/22/17  1:07 PM   Result Value Ref Range    Glucose (POC) 554 (H) 65 - 100 mg/dL    Performed by Mando, POC    Collection Time: 02/22/17  1:09 PM   Result Value Ref Range    Glucose (POC) 248 (H) 65 - 100 mg/dL    Performed by Mando, POC    Collection Time: 02/22/17  2:16 PM   Result Value Ref Range    Glucose (POC) 288 (H) 65 - 100 mg/dL    Performed by Ivette Moreno    GLUCOSE, POC    Collection Time: 02/22/17  3:41 PM   Result Value Ref Range    Glucose (POC) 310 (H) 65 - 100 mg/dL    Performed by Ivette Moreno    GLUCOSE, POC    Collection Time: 02/22/17  4:48 PM   Result Value Ref Range    Glucose (POC) 247 (H) 65 - 100 mg/dL    Performed by Ivette Moreno    METABOLIC PANEL, BASIC    Collection Time: 02/22/17  4:58 PM   Result Value Ref Range    Sodium 144 136 - 145 mmol/L    Potassium 3.0 (L) 3.5 - 5.1 mmol/L    Chloride 111 (H) 97 - 108 mmol/L    CO2 14 (LL) 21 - 32 mmol/L    Anion gap 19 (H) 5 - 15 mmol/L    Glucose 232 (H) 65 - 100 mg/dL    BUN 12 6 - 20 MG/DL    Creatinine 1.32 (H) 0.55 - 1.02 MG/DL    BUN/Creatinine ratio 9 (L) 12 - 20      GFR est AA 59 (L) >60 ml/min/1.73m2    GFR est non-AA 49 (L) >60 ml/min/1.73m2    Calcium 7.8 (L) 8.5 - 10.1 MG/DL   GLUCOSE, POC    Collection Time: 02/22/17  6:05 PM   Result Value Ref Range    Glucose (POC) 196 (H) 65 - 100 mg/dL    Performed by Agus GRADY, POC    Collection Time: 02/22/17  7:09 PM   Result Value Ref Range    Glucose (POC) 191 (H) 65 - 100 mg/dL    Performed by Mars Oropeza, POC    Collection Time: 02/22/17  8:15 PM   Result Value Ref Range    Glucose (POC) 142 (H) 65 - 100 mg/dL    Performed by Myra Conn      No results found.           Impression:      Principal Problem:    DKA, type 1, not at goal [facility]) ()      Overview: Type 1 DM diagnosed at age 7, on insulin pump since age 9    Active Problems:    Bipolar disorder ([facility] [address] ()      Partial epilepsy with impairment of consciousness ([facility] [address] (2/12/2015)      Seizure disorder (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      Plan:       Medications:  Current Facility-Administered Medications   Medication Dose Route Frequency    dextrose (D50W) injection syrg 12.5-25 g  12.5-25 g IntraVENous PRN    acetaminophen (TYLENOL) suppository 650 mg  650 mg Rectal Q4H PRN    diphenhydrAMINE (BENADRYL) injection 12.5 mg  12.5 mg IntraVENous Q4H PRN    ondansetron (ZOFRAN) injection 4 mg  4 mg IntraVENous Q4H PRN    insulin lispro (HUMALOG) injection   SubCUTAneous Q6H    dextrose 5% infusion  150 mL/hr IntraVENous CONTINUOUS    pantoprazole (PROTONIX) 40 mg in sodium chloride 0.9 % 10 mL injection  40 mg IntraVENous DAILY    enoxaparin (LOVENOX) injection 30 mg  30 mg SubCUTAneous Q24H    miconazole (MICOTIN) 2 % vaginal cream 1 Applicator  1 Applicator Vaginal QPM    lamoTRIgine (LaMICtal) tablet 200 mg  200 mg Oral Q12H    zinc oxide-cod liver oil (DESITIN) 40 % paste   Topical PRN    insulin regular (NOVOLIN R, HUMULIN R) 100 Units in 0.9% sodium chloride 100 mL infusion  0-50 Units/hr IntraVENous TITRATE    insulin lispro (HUMALOG) injection   SubCUTAneous TIDAC    glucose chewable tablet 16 g  4 Tab Oral PRN    glucagon (GLUCAGEN) injection 1 mg  1 mg IntraMUSCular PRN    mupirocin (BACTROBAN) 2 % ointment   Both Nostrils BID        Scheduled Medications:   Current Facility-Administered Medications   Medication Dose Route Frequency    insulin lispro (HUMALOG) injection   SubCUTAneous Q6H    dextrose 5% infusion  150 mL/hr IntraVENous CONTINUOUS    pantoprazole (PROTONIX) 40 mg in sodium chloride 0.9 % 10 mL injection  40 mg IntraVENous DAILY    enoxaparin (LOVENOX) injection 30 mg  30 mg SubCUTAneous Q24H    miconazole (MICOTIN) 2 % vaginal cream 1 Applicator  1 Applicator Vaginal QPM    lamoTRIgine (LaMICtal) tablet 200 mg  200 mg Oral Q12H    insulin regular (NOVOLIN R, HUMULIN R) 100 Units in 0.9% sodium chloride 100 mL infusion  0-50 Units/hr IntraVENous TITRATE    insulin lispro (HUMALOG) injection   SubCUTAneous TIDAC    mupirocin (BACTROBAN) 2 % ointment   Both Nostrils BID            Interventions:  Pt denies SI/HI/AVH.  Plan to f/u with Psychiatrist on DC        Recommendations for Treatment/Conditions:  Ct with current Tx, will need out patient follow up    Referral To:   Case management for OP appointment        Fransico Damian MD  2/22/2017 8:21 PM

## 2017-02-24 NOTE — PROGRESS NOTES
1900- bedside report received from [address] [facility] and assumed care of pt.  2000- visitors in to see pt. Pt asked for crackers and peanut butter and another pitcher of water. 0100- pt resting with eyes closed. 0500- am labs drawn. 0700- bedside report given to oncoming nurseCarmela RN.

## 2017-02-24 NOTE — DISCHARGE SUMMARY
Discharge Summary  Name: Byron Aguilar  [id number]  YOB: 1990 (Age: 23 y.o.)   Date of Admission: 2/21/2017  Date of Discharge: 2/24/2017  Attending Physician: Gisella Ramesh MD    Discharge Diagnosis:   Diabetic Ketoacidosis, T1DM  Acute renal failure  Lactic acidosis  Acute encephalopathy    Consultants: Pulmonary/Intensive care and Nephrology    Procedures: none    Brief Admission History/Reason for Admission Per Enedelia Diamond MD:   Ms. Maria R Pedersen is a 23 y.o.  female who presented to the Emergency Department complaining of confusion. She provides no reliable hx. No family is present. I have reveiwed the chart. I have discussed with ER staff and attending. I am informed she recently left [facility], having been admitted for DKA. Her chart suggests multiple psychiatric issues, and non-compliance with medications. She presents here with severe DKA, ARF, SIRS, acute encephalopathy. We will admit her for management. Brief Hospital Course by Main Problems:   Diabetic Ketoacidosis, T1DM, likely precipitated by insulin deficiency from poor compliance. UA neg, cxr neg. Pt was on insulin gtt, biarb gtt, all weaned off. She was transitioned to NHP 15 units BID. Pt has insulin pump, however had been compliance. She was evaluated by endocrinology while inpt. Patient has issues with insurance. [facility]. She will be discharge with NPH 15 units BID, [facility]. Patient is given a prescription, along with supplies for her dm treatment. She states she has insulin at home. She will follow with [facility] on 3/[facility].       Acute renal failure/ lactic acidosis, improved with IVF and resolution of DKA     Acute encephalopathy, resolved    Bipolar disorder  -continue Lamictal.  Pt advised to f/u with psych outpt. She denies SI/HI. Discharge Exam:  Patient seen and examined by me on discharge day.   Pertinent Findings:  Visit Vitals    BP 98/63 (BP 1 Location: Right arm, BP Patient Position: At rest)    Pulse 84    Temp 98.2 °F (36.8 °C)    Resp 20    Ht 5' 2\" (1.575 m)    Wt 60.6 kg (133 lb 9.6 oz)    SpO2 98%    Breastfeeding No    BMI 24.44 kg/m2     Gen:    Not in distress  Chest: Clear lungs  CVS:   Regular rhythm. No edema  Abd:  Soft, not distended, not tender    Discharge/Recent Laboratory Results:  Recent Labs      02/24/17   0510   02/22/17   2237   NA  147*   < >  141   K  3.3*   < >  3.1*   CL  112*   < >  110*   CO2  28   < >  22   BUN  9   < >  9   GLU  77   < >  202*   CA  8.4*   < >  8.2*   PHOS  1.6*   --   1.2*   MG   --    --   1.5*    < > = values in this interval not displayed. Recent Labs      02/24/17   0510   HGB  11.6   HCT  33.6*   WBC  4.8   PLT  145*       Discharge Medications:  Current Discharge Medication List      START taking these medications    Details   insulin NPH (NOVOLIN N, HUMULIN N) 100 unit/mL injection 15 Units by SubCUTAneous route Before breakfast and dinner. Qty: 3 Vial, Refills: 0         CONTINUE these medications which have NOT CHANGED    Details   lamoTRIgine (LAMICTAL) 200 mg tablet Take 200 mg by mouth two (2) times a day. insulin syringe-needle U-100 0.3 mL 31 gauge x 15/64\" syrg 1 Each by SubCUTAneous route five (5) times daily. Follow-up needed for refills  Qty: 150 Pen Needle, Refills: 0      insulin lispro (HUMALOG) 100 unit/mL injection For use in pump. Max 100 units/day. (Novolog not on formulary)  Qty: 9 Vial, Refills: 0         STOP taking these medications       INSULIN PUMP CARTRIDGE SC Comments:   Reason for Stopping:         Insulin Pump Cartridge crtg Comments:   Reason for Stopping:               DISPOSITION:    Home with Family: x   Home with HH/PT/OT/RN:    SNF/LTC:    MAY:    OTHER:          Follow up with:   PCP : No primary care provider on file.   Follow-up Information     Follow up With Details Comments MD April  Please make an appmt once Medicaid is reinstated. [facility] Go on 3/1/2017 Please go to Clinic to see if they can help you with supplies for insulin pump and continue using Clinic until you are notified that your Medicaid has been reinstated. [address], [place] Go to Please make an appmt to see when your Medicaid is reinstated and continue seeing as primary care doctor.  [place]  [phone]            Diet: cardiac    Total time in minutes spent coordinating this discharge (includes going over instructions, follow-up, prescriptions, and preparing report for sign off to her PCP) :  35 minutes

## 2017-02-24 NOTE — PROGRESS NOTES
2278: report received from [address] [place] [address], RN  0800:  103  9382: pt c/o premenstrual cramps, request pain medication. Will alert MD on rounds. 1253: yellow cab contacted ETA 1hour  1424: yellow cab not arrived, contacted, new eta 15min. 1449: Discharge instructions reviewed with patient and family. Allowed adequate time to ask questions, all questions answered. Printed copy of AVS given to patient. All belongings gathered, IV and tele discontinued.  Transported via wheelchair by RN to ED entrance and into [facility] Cab

## 2017-02-24 NOTE — DIABETES MGMT
DTC Progress Note    Recommendations/ Comments: Pt discussed with rounding team and Dr. Melvin Maya. Plan to continue current regimen. Pt may need prandial humalog increased if BG elevated at lunch. BG up to 400s last night at HS, but noted nursing note that reports pt was given PB and crackers last night. No prandial insulin ordered for snacks at this time. BG this am 77 likely r/t correctional insulin given last night. Chart reviewed on Billy Gutierrez during Multidisciplinary Rounds. A1c:   Lab Results   Component Value Date/Time    Hemoglobin A1c 14.4 02/21/2017 04:31 PM           Recent Glucose Results:   Lab Results   Component Value Date/Time    GLU 77 02/24/2017 05:10 AM    GLUCPOC 106 (H) 02/24/2017 08:02 AM    GLUCPOC 453 (H) 02/23/2017 11:46 PM    GLUCPOC 239 (H) 02/23/2017 04:49 PM        Lab Results   Component Value Date/Time    Creatinine 0.56 02/24/2017 05:10 AM       Active Orders   Diet    DIET DIABETIC CONSISTENT CARB Regular        PO intake:   Patient Vitals for the past 72 hrs:   % Diet Eaten   02/23/17 1300 75 %   02/23/17 1024 25 %   02/22/17 2000 40 %       Will continue to follow as needed. Thank you.   Francisco J Aguilar RN, [address]

## 2017-02-24 NOTE — PROGRESS NOTES
Pt is medically stable for dc home today with family. She has enough insulin at home to last until she goes to [facility] in [place] on Wed, 3/1/17. Diabetic Treatment Team provided pt with syringes. Clinic has given pt insulin and supplies for her pump in the past.    APA will see pt this AM prior to dc to provide application to reinstate Medicaid. Pt will be responsible for submitting application with verifications - reinstatement process will take same length of time as an initial Medicaid application - up to 45 days. Pt understands that she will need to go to free clinic until Medicaid is reinstated. It was discussed with pt that she should make appmt to f/u with Dr. Jameson Figueroa from Endocrinology and her PCP, Dr. Quentin Slade once Medicaid is reinstated. Pt's boyfriend does not get off work until 3:30, so cab will be arranged to transport pt home as soon as dc can be accomplished.   Karli Friedman, MSW

## 2017-02-24 NOTE — PROGRESS NOTES
2/24/2017  7:39 AM    INTENSIVIST PROGRESS NOTE:     Patient seen and evaluated   22 yo female admitted DKA  Started on IVF, bicarb, insulin drip  Transitioned to SSI  Resting comfortable  No complaints     Visit Vitals    BP 98/63 (BP 1 Location: Right arm, BP Patient Position: At rest)    Pulse 84    Temp 98.2 °F (36.8 °C)    Resp 20    Ht 5' 2\" (1.575 m)    Wt 60.6 kg (133 lb 9.6 oz)    SpO2 98%    Breastfeeding No    BMI 24.44 kg/m2       General: no distress  CV: RRR  Lungs: clear    Labs reviewed    A/P:  - DKA: Itransitioned to SSI  - acute renal failure: renal fx normalized with IVF  - PUD/DVT prophylaxis  - advance diet  - mobilize  - Will assist on disposition planning, transfer to floor today, day 2 awaiting for a bed  Clint Cage MD

## 2017-03-11 PROBLEM — E10.10 DKA, TYPE 1 (HCC): Status: ACTIVE | Noted: 2017-01-01

## 2017-03-11 NOTE — PROGRESS NOTES
Pharmacy Automatic Renal Dosing Protocol - Antimicrobials    Indication for Antimicrobials: Sepsis   Current Regimen of Each Antimicrobial (Start Day & Day of Therapy):  Zosyn 3.375 gm IV every 8 hours (started 3/11 day 1)  Vancomycin 1500 mg x1 then 1000 mg every 18 hours (started 3/11 day 1)    Goal Vancomycin Level (if needed): 15-20  Level(s) Ordered (if needed): na  Measured / Extrapolated Vancomycin Levels (if drawn): na   / na    Significant Cultures:   3/11 Blood Pending  CAPD, Hemodialysis or Renal Replacement Therapy: na   Paralysis, amputations, malnutrition: na  Recent Labs      17   1224  17   1137   CREA  1.80*   --    BUN  30*   --    WBC   --   37.9*     Temp (24hrs), Av.2 °F (34 °C), Min:93.2 °F (34 °C), Max:93.2 °F (34 °C)    Creatinine Clearance (Creatinine Clearance (ml/min)): 37 ml/min    Impression/Plan:   - Zosyn dosed appropriately based on renal function and indication.  - Vancomycin dosed appropriately based on renal function and indication. Extrapolated trough of 19.4. It is on the higher side - I anticipate patient's renal function to improve as her baseline (one month ago) was below 0.7       Pharmacy will follow daily and adjust medications as appropriate for renal function and/or serum levels.     Thank you,  Shameka Solorio, AKILAHD     Renal Dosing Tables on [employer]

## 2017-03-11 NOTE — H&P
Hospitalist Admission Note    NAME: Hue Solorio   :  1990   MRN:  [id number]     Date/Time:  3/11/2017 2:52 PM    Patient PCP: PROVIDER UNKNOWN  ________________________________________________________________________    My assessment of this patient's clinical condition and my plan of care is as follows.     Assessment / Plan:  Sepsis, POA  -unclear source  -UDS positive for cocaine  -pt unresponsive for unclear duration, concerning for possible aspiration  -UA neg, BC obtained  -cxr neg for evidence of infiltration  -will start vancomycin and zosyn, de-escalate as appropriate    Acute renal failure  -likely secondary to pre-renal due to dehydration and DKA  -IVF  -monitor bmp    Acute encephalopathy  Unresponsiveness  Acute respiratory failure  -CT head ordered  -pt intubated on arrival for protection or airway  -versed and fentanyl gtt, wean as tolerated  -abx as above  -IVF    Diabetic Ketoacidosis in the setting of [id number]  AGMA 34  Lactic acidosis 4.3  -likely precipitated by insulin deficiency, cocaine intoxication in the setting of noncompliance with meds  -NPO  -munoz placement for strict I/Os  -BMP, Mg, PO4, lactate q4h  -aggressive IVF fluid hydration with NS; change to D5 1/2NS when blood glucose <250 x 2 checks  -will start bicarb gtt, wean as tolerated  -insulin gtt until AG resolves; then overlap with SC insulin for 3-4h to avoid re-entry into DKA  -prn antiemetics   -diabetic education when able    Bipolar disorder  -will need to continue lamictal when able to take PO intake  -likely need psych input when able    HIV/ hep panel ordered in ED due to ED physician's exposure    Code Status: Full  Surrogate Decision Maker: Maryanne Yan-parent per facebook  at [phone]    DVT Prophylaxis: heparin   GI Prophylaxis: not indicated    Baseline: independent        Subjective:   CHIEF COMPLAINT: unresponsiveness    HISTORY OF PRESENT ILLNESS:     Genovevasravanthi Louis is a 23 y.o.  female  With pmhx significant for W0RT-mh of noncompliance, bipolar disorder present to ED via EMS for unresponsiveness. Pt is intubated and sedated so hx was obtained thru chart review and friend at bedside. Per hx, pt was found to be \"normal\" last night at 11PM.  Friend found her unresponsive this AM with mottled skin so EMS called. On arrival, GCS~ 7, pt noted to have shallow respiration, not responding to 1mg narcan. At the time, BG ~ 600. Patient was subsequently intubated and started on insulin gtt. Vitals: T 93.2, P 142, /67  Labs: ,  Na 144, K 3.3, Cr 1.8, lactic acid 4.3      We were asked to admit for work up and evaluation of the above problems. Past Medical History:   Diagnosis Date    [facility] failure) [facility] 2017    Bipolar disorder ([facility]     Contraception management     Gyn: Dr. Ila Martinez;  Nexplanon placed  and removed 7/6/15 because she wanted to conceive    Depression     Diabetes in pregnancy     Diabetes type I ([facility]     Age 7; on insulin pump    HX OTHER MEDICAL     late pnc at 25 weeks    HX OTHER MEDICAL     polyhydramnios; previous pregnancy    Unspecified epilepsy without mention of intractable epilepsy ([facility]     diabetic related        Past Surgical History:   Procedure Laterality Date     DELIVERY ONLY  , 14    2 cesareans       Social History   Substance Use Topics    Smoking status: Current Every Day Smoker     Packs/day: 0.50     Years: 7.00     Types: Cigarettes    Smokeless tobacco: Never Used    Alcohol use No        Family History   Problem Relation Age of Onset    Hypertension Maternal Grandmother     High Cholesterol Maternal Grandmother     Hypertension Maternal Grandfather     High Cholesterol Maternal Grandfather     No Known Problems Mother     No Known Problems Father     Cancer Paternal Grandfather     Other Sister      stomach issues/gluten-lactose sensitive     Allergies Allergen Reactions    Other Medication Rash     Tegaderm    Sulfa (Sulfonamide Antibiotics) Hives        Prior to Admission medications    Medication Sig Start Date End Date Taking? Authorizing Provider   insulin NPH (NOVOLIN N, HUMULIN N) 100 unit/mL injection 15 Units by SubCUTAneous route Before breakfast and dinner. 2/24/17   Caryn Giles MD   lamoTRIgine (LAMICTAL) 200 mg tablet Take 200 mg by mouth two (2) times a day. Historical Provider   insulin syringe-needle U-100 0.3 mL 31 gauge x 15/64\" syrg 1 Each by SubCUTAneous route five (5) times daily. Follow-up needed for refills 9/27/16   Ludivina Rodríguez MD   insulin lispro (HUMALOG) 100 unit/mL injection For use in pump. Max 100 units/day.  (Novolog not on formulary) 6/24/16   Ludivina Rodríguez MD       REVIEW OF SYSTEMS:     I am not able to complete the review of systems because:  x The patient is intubated and sedated    The patient has altered mental status due to his acute medical problems    The patient has baseline aphasia from prior stroke(s)    The patient has baseline dementia and is not reliable historian    The patient is in acute medical distress and unable to provide information           Total of 12 systems reviewed as follows:       POSITIVE= underlined text  Negative = text not underlined  General:  fever, chills, sweats, generalized weakness, weight loss/gain,      loss of appetite   Eyes:    blurred vision, eye pain, loss of vision, double vision  ENT:    rhinorrhea, pharyngitis   Respiratory:   cough, sputum production, SOB, PARMAR, wheezing, pleuritic pain   Cardiology:   chest pain, palpitations, orthopnea, PND, edema, syncope   Gastrointestinal:  abdominal pain , N/V, diarrhea, dysphagia, constipation, bleeding   Genitourinary:  frequency, urgency, dysuria, hematuria, incontinence   Muskuloskeletal :  arthralgia, myalgia, back pain  Hematology:  easy bruising, nose or gum bleeding, lymphadenopathy   Dermatological: rash, ulceration, pruritis, color change / jaundice  Endocrine:   hot flashes or polydipsia   Neurological:  headache, dizziness, confusion, focal weakness, paresthesia,     Speech difficulties, memory loss, gait difficulty  Psychological: Feelings of anxiety, depression, agitation    Objective:   VITALS:    Visit Vitals    BP 93/52    Pulse (!) 128    Temp (!) 93.2 °F (34 °C)    Resp 21    Wt 58 kg (127 lb 13.9 oz)    SpO2 93%    BMI 23.39 kg/m2       PHYSICAL EXAM:    General:    Intubated/unresponsive  HEENT: Atraumatic, ETT in place  Neck:  Supple, symmetrical,  thyroid: non tender  Lungs:   Clear to auscultation bilaterally. No Wheezing or Rhonchi. No rales. Chest wall:  No tenderness  No Accessory muscle use. Heart:   Tachycardic,  No  murmur   No edema  Abdomen:   Soft, non-tender. Not distended. Bowel sounds normal  Extremities: No cyanosis. No clubbing      Capillary refill normal,  Radial pulse 2+  Skin:     Not pale. Not Jaundiced  No rashes   Psych:  Good insight. Not depressed. Not anxious or agitated.   Neurologic: Pt is intubated, unresponsive however response to noxious stimuli.    _______________________________________________________________________  Care Plan discussed with:    Comments   Patient     Family  x Friend at bedside   RN     Care Manager                    Consultant:      _______________________________________________________________________  Expected  Disposition:   Home with Family x   HH/PT/OT/RN    SNF/LTC    MAY    ________________________________________________________________________  TOTAL TIME:  Minutes    Critical Care Provided   79  Minutes non procedure based      Comments    x Reviewed previous records   >50% of visit spent in counseling and coordination of care  Discussion with patient and/or family and questions answered       ________________________________________________________________________  Signed: Yodit Salas MD    Procedures: see electronic medical records for all procedures/Xrays and details which were not copied into this note but were reviewed prior to creation of Plan. LAB DATA REVIEWED:    Recent Results (from the past 24 hour(s))   EKG, 12 LEAD, INITIAL    Collection Time: 03/11/17 11:27 AM   Result Value Ref Range    Ventricular Rate 104 BPM    Atrial Rate 104 BPM    P-R Interval 150 ms    QRS Duration 104 ms    Q-T Interval 362 ms    QTC Calculation (Bezet) 476 ms    Calculated P Axis 66 degrees    Calculated R Axis 64 degrees    Calculated T Axis 29 degrees    Diagnosis       Sinus tachycardia  Nonspecific ST abnormality  No previous ECGs available     GLUCOSE, POC    Collection Time: 03/11/17 11:28 AM   Result Value Ref Range    Glucose (POC) >600 () 65 - 100 mg/dL    Performed by Monse Weldon (ED tech)    CBC WITH AUTOMATED DIFF    Collection Time: 03/11/17 11:37 AM   Result Value Ref Range    WBC 37.9 (H) 3.6 - 11.0 K/uL    RBC 4.12 3.80 - 5.20 M/uL    HGB 13.1 11.5 - 16.0 g/dL    HCT 43.3 35.0 - 47.0 %    .1 (H) 80.0 - 99.0 FL    MCH 31.8 26.0 - 34.0 PG    MCHC 30.3 30.0 - 36.5 g/dL    RDW 14.6 (H) 11.5 - 14.5 %    PLATELET 394 095 - 555 K/uL    NEUTROPHILS 68 %    BAND NEUTROPHILS 5 %    LYMPHOCYTES 25 %    MONOCYTES 2 %    EOSINOPHILS 0 %    BASOPHILS 0 %    ABS. NEUTROPHILS 27.6 K/UL    ABS. LYMPHOCYTES 9.5 K/UL    ABS. MONOCYTES 0.8 K/UL    ABS. EOSINOPHILS 0.0 K/UL    ABS.  BASOPHILS 0.0 K/UL    RBC COMMENTS MACROCYTOSIS  1+        WBC COMMENTS FEW SMUDGE CELLS SEEN    POC TROPONIN-I    Collection Time: 03/11/17 11:37 AM   Result Value Ref Range    Troponin-I (POC) <0.04 0.00 - 0.08 ng/mL   NUCLEATED RBC    Collection Time: 03/11/17 11:37 AM   Result Value Ref Range    NRBC 0.0 0  WBC    ABSOLUTE NRBC 0.00 0.00 - 0.01 K/uL   VENOUS BLOOD GAS    Collection Time: 03/11/17 12:01 PM   Result Value Ref Range    VENOUS PH 6.67 (LL) 7.32 - 7.42      VENOUS PCO2 18 (L) 41 - 51 mmHg    VENOUS PO2 101 (H) 25 - 40 mmHg    VENOUS O2 SATURATION 86 65 - 88 %    VENOUS BICARBONATE 2 (L) 23 - 28 mmol/L    VENOUS BASE DEFICIT 34.9 mmol/L    O2 METHOD VENTILATOR      FIO2 40 %    MODE A/C      Tidal volume 400      SET RATE 16      EPAP/CPAP/PEEP 5.0      Sample source VENOUS      SITE OTHER      Critical value read back Susan Le MD    URINALYSIS W/ REFLEX CULTURE    Collection Time: 03/11/17 12:14 PM   Result Value Ref Range    Color YELLOW/STRAW      Appearance TURBID (A) CLEAR      Specific gravity 1.024 1.003 - 1.030      pH (UA) 5.0 5.0 - 8.0      Protein 30 (A) NEG mg/dL    Glucose >1000 (A) NEG mg/dL    Ketone 80 (A) NEG mg/dL    Bilirubin NEGATIVE  NEG      Blood SMALL (A) NEG      Urobilinogen 0.2 0.2 - 1.0 EU/dL    Nitrites NEGATIVE  NEG      Leukocyte Esterase NEGATIVE  NEG      WBC 0-4 0 - 4 /hpf    RBC 5-10 0 - 5 /hpf    Epithelial cells FEW FEW /lpf    Bacteria NEGATIVE  NEG /hpf    UA:UC IF INDICATED CULTURE NOT INDICATED BY UA RESULT CNI      Mucus TRACE (A) NEG /lpf    Amorphous Crystals FEW (A) NEG     DRUG SCREEN, URINE    Collection Time: 03/11/17 12:14 PM   Result Value Ref Range    AMPHETAMINE NEGATIVE  NEG      BARBITURATES NEGATIVE  NEG      BENZODIAZEPINE NEGATIVE  NEG      COCAINE POSITIVE (A) NEG      METHADONE NEGATIVE  NEG      OPIATES NEGATIVE  NEG      PCP(PHENCYCLIDINE) NEGATIVE  NEG      THC (TH-CANNABINOL) NEGATIVE  NEG      Drug screen comment (NOTE)    METABOLIC PANEL, COMPREHENSIVE    Collection Time: 03/11/17 12:24 PM   Result Value Ref Range    Sodium 142 136 - 145 mmol/L    Potassium 3.8 3.5 - 5.1 mmol/L    Chloride 101 97 - 108 mmol/L    CO2 7 (LL) 21 - 32 mmol/L    Anion gap 34 (H) 5 - 15 mmol/L    Glucose 964 (HH) 65 - 100 mg/dL    BUN 30 (H) 6 - 20 MG/DL    Creatinine 1.80 (H) 0.55 - 1.02 MG/DL    BUN/Creatinine ratio 17 12 - 20      GFR est AA 42 (L) >60 ml/min/1.73m2    GFR est non-AA 34 (L) >60 ml/min/1.73m2    Calcium 7.1 (L) 8.5 - 10.1 MG/DL    Bilirubin, total 0.4 0.2 - 1.0 MG/DL    ALT (SGPT) 21 12 - 78 U/L AST (SGOT) 33 15 - 37 U/L    Alk.  phosphatase 150 (H) 45 - 117 U/L    Protein, total 5.2 (L) 6.4 - 8.2 g/dL    Albumin 2.3 (L) 3.5 - 5.0 g/dL    Globulin 2.9 2.0 - 4.0 g/dL    A-G Ratio 0.8 (L) 1.1 - 2.2     HCG URINE, QL    Collection Time: 03/11/17 12:35 PM   Result Value Ref Range    HCG urine, Ql. NEGATIVE  NEG     GLUCOSE, POC    Collection Time: 03/11/17 12:42 PM   Result Value Ref Range    Glucose (POC) >600 (HH) 65 - 100 mg/dL    Performed by Rayburn Nyhan    LACTIC ACID, PLASMA    Collection Time: 03/11/17 12:45 PM   Result Value Ref Range    Lactic acid 4.3 (HH) 0.4 - 2.0 MMOL/L   BLOOD GAS, ARTERIAL    Collection Time: 03/11/17  1:40 PM   Result Value Ref Range    pH 6.92 (LL) 7.35 - 7.45      PCO2 17 (L) 35.0 - 45.0 mmHg    PO2 188 (H) 80 - 100 mmHg    O2 SAT 98 (H) 92 - 97 %    BICARBONATE 3 (L) 22 - 26 mmol/L    BASE DEFICIT 28.0 mmol/L    O2 METHOD VENTILATOR      FIO2 40 %    MODE A/C      Tidal volume 400      SET RATE 16      SPONTANEOUS RATE 8.0      EPAP/CPAP/PEEP 5.0      Sample source ARTERIAL      SITE LEFT RADIAL      DONN'S TEST YES      Critical value read back Martintawny Mckeon MD    GLUCOSE, POC    Collection Time: 03/11/17  1:50 PM   Result Value Ref Range    Glucose (POC) >600 (HH) 65 - 100 mg/dL    Performed by Radha Angelo    GLUCOSE, POC    Collection Time: 03/11/17  2:03 PM   Result Value Ref Range    Glucose (POC) >600 (HH) 65 - 100 mg/dL    Performed by Radha Angelo

## 2017-03-11 NOTE — ED NOTES
Pt arrives via EMS from home. Found by someone in her home, unresponsive, shallow respiration. EMS states blood glucose reading HIGH. No response to 1 mg of narcan. Known hx of DKA and was recently admitted to ED 2/21/17 for DKA. GCS of 7. Pt does respond to deep painful stimuli. Pt's bra and shirt cut and removed due to acute need to assess patient. Multiple ED staff at bedside at this time.  Respiratory therapy at bedside

## 2017-03-11 NOTE — ED NOTES
Central line placed. RT paged for ABG. Radiology called for portable xray.    Pt. Remains adequately sedated on 20 mcg/kg/hr

## 2017-03-11 NOTE — PROGRESS NOTES
1600: pt received from ED, multiple rings and earrings in a lab bag placed in  with pt label applied. Primary Nurse Keyana Aguillon RN and Rubén Franco RN performed a dual skin assessment on this patient No impairment noted  Jimmy score is 16  1615: pt combative when attempting to put in bite block due to biting ETT tube. 1630: pt requiring 4 people to hold in bed  1649: Dr. Ki Olson notified of lab results lactic acid 3.5 glucose 635. New orders received  1495: restraints applied to pt, pt reaching for ETT  1658: versed gtt came up with pt, wasted 80ml with Rubén Franco RN  2417: Dr. Genet Vela notified of pt lab results, no new orders received. 1909: Bedside shift change report given to Ronaldo Smith (oncoming nurse) by [facility], RN (offgoing nurse). Report included the following information SBAR, Kardex, MAR, Accordion and Recent Results.

## 2017-03-11 NOTE — ED NOTES
Pt. Completed 3 liters of IV fluids and started on insulin drip per protocol. Dr. Yfn Lugo at bedside for central line insertion.   Pt. Placed on Bare hugger for hypothermia (Temp 93 degrees)

## 2017-03-11 NOTE — ED NOTES
Further history obtained from friend, Kanchan Tate. Apparently patient was last seen at 0100 \"tired\", but responsive on 3/11/17. Patient was then found by Kanchan Tate around 21 [phone] this morning \"not responding\". Upon arrival patient had severe Kussmaul respirations and was mottled. Unknown compliance with medications.  Unknown drug/ETOH use

## 2017-03-11 NOTE — IP AVS SNAPSHOT
[address] [facility] 
[phone] Patient: Hue Solorio MRN: [id number] :1990 You are allergic to the following Allergen Reactions Other Medication Rash Tegaderm Sulfa (Sulfonamide Antibiotics) Hives Recent Documentation Weight Breastfeeding? BMI OB Status Smoking Status 58 kg No 23.39 kg/m2 Having regular periods Current Every Day Smoker Unresulted Labs Order Current Status CULTURE, BLOOD Preliminary result CULTURE, BLOOD Preliminary result Emergency Contacts  (Rel.) Home Phone Work Phone Mobile Phone Maryanne Yan (Parent) [phone] -- -- About your hospitalization You were admitted on:  2017 You last received care in the:  [address] You were discharged on:  March 15, 2017 Why you were hospitalized Your primary diagnosis was:  Not on File Your diagnoses also included:  Dka, Type 1 (Hcc) Providers Seen During Your Hospitalizations Provider Role Specialty Primary office phone Colton Fernandes MD Attending Provider Emergency Medicine [phone] Dorcas Borrero MD Attending Provider Internal Medicine [phone]  
 Gloria Olivas MD Attending Provider Hospitalist [phone] Your Primary Care Physician (PCP) Primary Care Physician Office Phone Office Fax UNKNOWN, PROVIDER ** None ** ** None ** Follow-up Information Follow up With Details Comments Contact Info [facility] Schedule an appointment as soon as possible for a visit in 1 week check to see how Diabetes is doing Leann Phillips MD Schedule an appointment as soon as possible for a visit in 3 weeks  [facility] [address] [facility] 
[phone] Current Discharge Medication List  
  
 CONTINUE these medications which have CHANGED Dose & Instructions Dispensing Information Comments Morning Noon Evening Bedtime HumaLOG 100 unit/mL injection Generic drug:  insulin lispro What changed:  Another medication with the same name was removed. Continue taking this medication, and follow the directions you see here. Your last dose was: Your next dose is:    
   
   
 by SubCUTAneous route. 1 unit per 30 carbs Refills:  0  
     
   
   
   
  
 lamoTRIgine 200 mg tablet Commonly known as: LaMICtal  
What changed:  when to take this Your last dose was: Your next dose is:    
   
   
 Dose:  200 mg Take 1 Tab by mouth two (2) times a day. Quantity:  60 Tab Refills:  3 CONTINUE these medications which have NOT CHANGED Dose & Instructions Dispensing Information Comments Morning Noon Evening Bedtime  
 insulin  unit/mL injection Commonly known as:  Ira Aleman Your last dose was: Your next dose is:    
   
   
 Dose:  15 Units 15 Units by SubCUTAneous route Before breakfast and dinner. Quantity:  3 Vial  
Refills:  2  
     
   
   
   
  
 insulin syringe-needle U-100 0.3 mL 31 gauge x 15/64\" Syrg Your last dose was: Your next dose is:    
   
   
 Dose:  1 Each  
1 Each by SubCUTAneous route five (5) times daily. Follow-up needed for refills Quantity:  150 Pen Needle Refills:  0 Where to Get Your Medications Information on where to get these meds will be given to you by the nurse or doctor. ! Ask your nurse or doctor about these medications  
  insulin  unit/mL injection  
 lamoTRIgine 200 mg tablet Discharge Instructions Patient Discharge Instructions Arland Blizzard / [id number] : 1990 Admitted 3/11/2017 Discharged: 3/15/2017 DISCHARGE DIAGNOSIS:  
 
 DKA, type 1 ([facility] [address] (3/11/2017) What to do at [facility] 1. Recommended diet: Diabetic Diet 2. Recommended activity: Activity as tolerated 3. If you experience any of the following symptoms then please call your primary care physician or return to the emergency room if you cannot get hold of your doctor: 
 Fevers > 100.5, chills Nausea or vomiting, persistent diarrhea > 24 hours Blood in stool or black stools Chest pain or SOB Follow-up Information Follow up With Details Comments Contact Info [facility] Schedule an appointment as soon as possible for a visit in 1 week check to see how Diabetes is doing Marcelino Jain MD Schedule an appointment as soon as possible for a visit in 3 weeks  [address] 
[phone] Please take insulin as directed, do not stop or miss doses Information obtained by : 
I understand that if any problems occur once I am at home I am to contact my physician. I understand and acknowledge receipt of the instructions indicated above. Physician's or R.N.'s Signature                                                                  Date/Time Patient or Representative Signature                                                          Date/Time Discharge Orders None [facility] Announcement We are excited to announce that we are making your provider's discharge notes available to you in [employer]3 membrane. You will see these notes when they are completed and signed by the physician that discharged you from your recent hospital stay.   If you have any questions or concerns about any information you see in [employer], please call the Health Information Department where you were seen or reach out to your Primary Care Provider for more information about your plan of care. Introducing [facility]! Stephanie Barr introduces [employer] patient portal. Now you can access parts of your medical record, email your doctor's office, and request medication refills online. 1. In your internet browser, go to [email or web address] 2. Click on the First Time User? Click Here link in the Sign In box. You will see the New Member Sign Up page. 3. Enter your [employer] Access Code exactly as it appears below. You will not need to use this code after youve completed the sign-up process. If you do not sign up before the expiration date, you must request a new code. · [employer] Access Code: [email or web address] Expires: 5/22/2017  5:04 PM 
 
4. Enter the last four digits of your Social Security Number (xxxx) and Date of Birth (mm/dd/yyyy) as indicated and click Submit. You will be taken to the next sign-up page. 5. Create a [employer] ID. This will be your [employer] login ID and cannot be changed, so think of one that is secure and easy to remember. 6. Create a [employer] password. You can change your password at any time. 7. Enter your Password Reset Question and Answer. This can be used at a later time if you forget your password. 8. Enter your e-mail address. You will receive e-mail notification when new information is available in [address] 9. Click Sign Up. You can now view and download portions of your medical record. 10. Click the Download Summary menu link to download a portable copy of your medical information. If you have questions, please visit the Frequently Asked Questions section of the [employer] website. Remember, [employer] is NOT to be used for urgent needs. For medical emergencies, dial 911. Now available from your iPhone and Android! General Information Please provide this summary of care documentation to your next provider. Patient Signature:  ____________________________________________________________ Date:  ____________________________________________________________  
  
Veronica Hiawatha Provider Signature:  ____________________________________________________________ Date:  ____________________________________________________________

## 2017-03-11 NOTE — ED NOTES
During portable xray patient awoke and started to grab for tube and open eyes spontaneously.  Patient titrated up to 25 mcg/kg/hr to maintain sedation

## 2017-03-11 NOTE — ED PROVIDER NOTES
HPI Comments: Shane Spaulding is a 23 y.o. female with PMHx significant for Type 1 diabetes who presents via EMS to [id number] [facility] ED unresponsive. Per EMS Pt was found 35-35 minutes ago. Pt is a known diabetic and her sugar on route was greater than 600. EMS was unable to get a pulse ox. Pt has a recent admission for DKA. PCP: PROVIDER UNKNOWN    Social Hx: + tobacco (0.5 ppd), -EtOH (-), - illicit drugs    History is otherwise limited due to pt being unresponsive. The history is provided by the EMS personnel. No  was used. Past Medical History:   Diagnosis Date    ARF (acute renal failure) ([facility] 2017    Bipolar disorder ([facility]     Contraception management     Gyn: Dr. Lavern Rojas; Nexplanon placed  and removed 7/6/15 because she wanted to conceive    Depression     Diabetes in pregnancy     Diabetes type I ([facility]     Age 7; on insulin pump    HX OTHER MEDICAL     late pnc at 25 weeks    HX OTHER MEDICAL     polyhydramnios; previous pregnancy    Unspecified epilepsy without mention of intractable epilepsy ([facility]     diabetic related       Past Surgical History:   Procedure Laterality Date     DELIVERY ONLY  , 13    2 cesareans         Family History:   Problem Relation Age of Onset    Hypertension Maternal Grandmother     High Cholesterol Maternal Grandmother     Hypertension Maternal Grandfather     High Cholesterol Maternal Grandfather     No Known Problems Mother     No Known Problems Father     Cancer Paternal Grandfather     Other Sister      stomach issues/gluten-lactose sensitive       Social History     Social History    Marital status: SINGLE     Spouse name: N/A    Number of children: N/A    Years of education: N/A     Occupational History    Not on file.      Social History Main Topics    Smoking status: Current Every Day Smoker     Packs/day: 0.50     Years: 7.00     Types: Cigarettes    Smokeless tobacco: Never Used    Alcohol use No    Drug use: No    Sexual activity: Yes     Partners: Male     Birth control/ protection: Pill     Other Topics Concern    Not on file     Social History Narrative         ALLERGIES: Other medication and Sulfa (sulfonamide antibiotics)    Review of Systems   Unable to perform ROS: Patient unresponsive       Patient Vitals for the past 12 hrs:   Temp Pulse Resp BP SpO2   03/11/17 1359 - (!) 133 22 111/63 96 %   03/11/17 1330 - (!) 113 20 109/64 97 %   03/11/17 1315 - (!) 112 21 117/59 97 %   03/11/17 1300 - (!) 102 20 114/61 98 %   03/11/17 1250 - (!) 107 (!) 31 110/56 98 %   03/11/17 1245 - (!) 110 20 111/57 99 %   03/11/17 1235 - (!) 113 21 123/69 98 %   03/11/17 1210 - 99 24 126/80 98 %   03/11/17 1205 - 100 25 134/74 98 %   03/11/17 1200 - 98 24 129/82 98 %   03/11/17 1155 - 97 25 130/85 98 %   03/11/17 1150 - 93 24 (!) 137/91 99 %   03/11/17 1145 - 88 24 121/78 100 %   03/11/17 1140 - - 22 - (!) 10 %   03/11/17 1136 - (!) 107 (!) 31 117/75 100 %   03/11/17 1125 (!) 93.2 °F (34 °C) (!) 102 8 112/67 96 %            Physical Exam   Constitutional: She appears well-developed and well-nourished. HENT:   Head: Normocephalic and atraumatic. Dry mucous membranes. Eyes: Conjunctivae are normal.   Pupils sluggish to react. Neck: Normal range of motion. Neck supple. Cardiovascular: Normal rate and regular rhythm. Pulmonary/Chest: Effort normal and breath sounds normal. No respiratory distress. Abdominal: Soft. She exhibits no distension. There is no tenderness. Musculoskeletal: She exhibits no edema or deformity. Neurological: She is unresponsive. Not mobilizing for pain. Not spontaneously opening her eyes. No response to verbal or physical stimulus. Skin: Skin is warm and dry. Nursing note and vitals reviewed.        MDM  Number of Diagnoses or Management Options  Diabetic ketoacidosis with coma associated with type 1 diabetes mellitus [facility]):   Diagnosis management comments: Patient presenting unresponsive for unknown down time. DDx: DKA most likely given hx and sugarx over 600, medication toxicity, infection, anemia, electrolyte/metabolic anomoly, hypercapnea, stroke/bleed/mass, dehydration, illicit drug intoxication. Will obtain labwork, UA, EKG and imaging. Pt's GCS was 6 on arrival with kussmaul respirations so intubated. Chem 8 showed nl K, low bicarb and high glucose. Fluids and insulin 10U given. Amount and/or Complexity of Data Reviewed  Clinical lab tests: ordered and reviewed  Tests in the radiology section of CPT®: ordered and reviewed  Tests in the medicine section of CPT®: ordered and reviewed  Obtain history from someone other than the patient: yes (EMS)  Review and summarize past medical records: yes  Independent visualization of images, tracings, or specimens: yes    Critical Care  Total time providing critical care: 30-74 minutes    ED Course       Procedures    EKG interpretation: (Preliminary)  11:27 AM  Rhythm: sinus tachycardia; and irregular. Rate (approx.): 104; Axis: normal; AL interval: normal; QRS interval: normal ; ST/T wave: non-specific ST abnormality. Written by Anais Daley ED Scribe as dictated by Curt Roche M.D    Procedure Note - Orotracheal Intubation:   11:40 AM  Performed by: Curt Roche M.D   Indication for procedure: Pt is unresponsive  RSI performed. The patient was sedated with 20 of etomidate and 60 of succinylcholine and orotracheally intubated with a 7.5 cuffed Czech endotracheal tube using a [place] scope with direct visualization. Tube was advanced to 21 cm at the lip. ETT location confirmed by bilateral, symmetric breath sounds, good end-tidal CO2 detector color change  and no breath sounds over stomach. Number of attempts: 1  Complications: none  An endotracheal tube introducer (bougie) was used to assist tube insertion. The procedure took 1-15 minutes, and pt tolerated well.   Written by Anais Daely ED Scribe, as dictated by Staci Phillips M.D. CRITICAL CARE NOTE :    12:22 PM      IMPENDING DETERIORATION -Airway, Respiratory, Cardiovascular and Metabolic    ASSOCIATED RISK FACTORS - Hypotension, Shock, Metabolic changes and Dehydration    MANAGEMENT- Bedside Assessment and Supervision of Care    INTERPRETATION -  Xrays, CT Scan, Blood Gases, ECG, Blood Pressure and Cardiac Output Measures     INTERVENTIONS - hemodynamic mngmt, vent mngmt and Metobolic interventions    CASE REVIEW - Hospitalist and Nursing    TREATMENT RESPONSE -Stable    PERFORMED BY - Self        NOTES   :      I have spent 50 minutes of critical care time involved in lab review, consultations with specialist, family decision- making, bedside attention and documentation. During this entire length of time I was immediately available to the patient . Staci Phillips M.D        Procedure Note - Central Line Placement:   1:22 PM  Performed by: Dr. Davied Holstein    Immediately prior to the procedure, the patient was reevaluated and found suitable for the planned procedure and any planned medications. Immediately prior to the procedure a time out was called to verify the correct patient, procedure, equipment, staff, and marking as appropriate. Area was cleansed with Chlorprep and not anesthetized. Prepped and draped in sterile fashion. Landmarks identified. 18 gauge needle with triple lumen catheter was inserted into pt's Right, Internal Jugular Vein with ultrasound guidance. Line sutured in place; sterile dressing applied. Position: Trendelenburg  Number of attempts: 1  Estimated blood loss: minimal  The procedure took 1-15 minutes, and pt tolerated well. Written by Simone Ponce ED Scribe, as dictated by Dr. Davied Holstein. CONSULT NOTE:   1:32 PM  Staci Phillips M.D spoke with Dr. Moraima Pearce,   Specialty: Hospitalist  Discussed pt's hx, disposition, and available diagnostic and imaging results. Reviewed care plans.  Consultant will evaluate pt for admission. He asked for an order of CT head, pregnancy, and he will add vancomycin. Written by Ramón Hi, ED Scribe, as dictated by Jami Hodgson M.D. PROGRESS NOTE:  2:10 PM  Pt on propofol drip however BP slowly trending down advised nurse to switch to versed infusion instead. Written by Ramón Hi, ED Scribe, as dictated by Jami Hodgson M.D.    LABORATORY TESTS:  Recent Results (from the past 12 hour(s))   EKG, 12 LEAD, INITIAL    Collection Time: 03/11/17 11:27 AM   Result Value Ref Range    Ventricular Rate 104 BPM    Atrial Rate 104 BPM    P-R Interval 150 ms    QRS Duration 104 ms    Q-T Interval 362 ms    QTC Calculation (Bezet) 476 ms    Calculated P Axis 66 degrees    Calculated R Axis 64 degrees    Calculated T Axis 29 degrees    Diagnosis       Sinus tachycardia  Nonspecific ST abnormality  No previous ECGs available     GLUCOSE, POC    Collection Time: 03/11/17 11:28 AM   Result Value Ref Range    Glucose (POC) >600 () 65 - 100 mg/dL    Performed by Monse Weldon (ED tech)    CBC WITH AUTOMATED DIFF    Collection Time: 03/11/17 11:37 AM   Result Value Ref Range    WBC 37.9 (H) 3.6 - 11.0 K/uL    RBC 4.12 3.80 - 5.20 M/uL    HGB 13.1 11.5 - 16.0 g/dL    HCT 43.3 35.0 - 47.0 %    .1 (H) 80.0 - 99.0 FL    MCH 31.8 26.0 - 34.0 PG    MCHC 30.3 30.0 - 36.5 g/dL    RDW 14.6 (H) 11.5 - 14.5 %    PLATELET 929 002 - 870 K/uL    NEUTROPHILS 68 %    BAND NEUTROPHILS 5 %    LYMPHOCYTES 25 %    MONOCYTES 2 %    EOSINOPHILS 0 %    BASOPHILS 0 %    ABS. NEUTROPHILS 27.6 K/UL    ABS. LYMPHOCYTES 9.5 K/UL    ABS. MONOCYTES 0.8 K/UL    ABS. EOSINOPHILS 0.0 K/UL    ABS.  BASOPHILS 0.0 K/UL    RBC COMMENTS MACROCYTOSIS  1+        WBC COMMENTS FEW SMUDGE CELLS SEEN    POC TROPONIN-I    Collection Time: 03/11/17 11:37 AM   Result Value Ref Range    Troponin-I (POC) <0.04 0.00 - 0.08 ng/mL   NUCLEATED RBC    Collection Time: 03/11/17 11:37 AM   Result Value Ref Range NRBC 0.0 0  WBC    ABSOLUTE NRBC 0.00 0.00 - 0.01 K/uL   VENOUS BLOOD GAS    Collection Time: 03/11/17 12:01 PM   Result Value Ref Range    VENOUS PH 6.67 (LL) 7.32 - 7.42      VENOUS PCO2 18 (L) 41 - 51 mmHg    VENOUS PO2 101 (H) 25 - 40 mmHg    VENOUS O2 SATURATION 86 65 - 88 %    VENOUS BICARBONATE 2 (L) 23 - 28 mmol/L    VENOUS BASE DEFICIT 34.9 mmol/L    O2 METHOD VENTILATOR      FIO2 40 %    MODE A/C      Tidal volume 400      SET RATE 16      EPAP/CPAP/PEEP 5.0      Sample source VENOUS      SITE OTHER      Critical value read back Buddy Cooler MD    URINALYSIS W/ REFLEX CULTURE    Collection Time: 03/11/17 12:14 PM   Result Value Ref Range    Color YELLOW/STRAW      Appearance TURBID (A) CLEAR      Specific gravity 1.024 1.003 - 1.030      pH (UA) 5.0 5.0 - 8.0      Protein 30 (A) NEG mg/dL    Glucose >1000 (A) NEG mg/dL    Ketone 80 (A) NEG mg/dL    Bilirubin NEGATIVE  NEG      Blood SMALL (A) NEG      Urobilinogen 0.2 0.2 - 1.0 EU/dL    Nitrites NEGATIVE  NEG      Leukocyte Esterase NEGATIVE  NEG      WBC 0-4 0 - 4 /hpf    RBC 5-10 0 - 5 /hpf    Epithelial cells FEW FEW /lpf    Bacteria NEGATIVE  NEG /hpf    UA:UC IF INDICATED CULTURE NOT INDICATED BY UA RESULT CNI      Mucus TRACE (A) NEG /lpf    Amorphous Crystals FEW (A) NEG     DRUG SCREEN, URINE    Collection Time: 03/11/17 12:14 PM   Result Value Ref Range    AMPHETAMINE NEGATIVE  NEG      BARBITURATES NEGATIVE  NEG      BENZODIAZEPINE NEGATIVE  NEG      COCAINE POSITIVE (A) NEG      METHADONE NEGATIVE  NEG      OPIATES NEGATIVE  NEG      PCP(PHENCYCLIDINE) NEGATIVE  NEG      THC (TH-CANNABINOL) NEGATIVE  NEG      Drug screen comment (NOTE)    METABOLIC PANEL, COMPREHENSIVE    Collection Time: 03/11/17 12:24 PM   Result Value Ref Range    Sodium 142 136 - 145 mmol/L    Potassium 3.8 3.5 - 5.1 mmol/L    Chloride 101 97 - 108 mmol/L    CO2 7 (LL) 21 - 32 mmol/L    Anion gap 34 (H) 5 - 15 mmol/L    Glucose 964 (HH) 65 - 100 mg/dL    BUN 30 (H) 6 - 20 MG/DL    Creatinine 1.80 (H) 0.55 - 1.02 MG/DL    BUN/Creatinine ratio 17 12 - 20      GFR est AA 42 (L) >60 ml/min/1.73m2    GFR est non-AA 34 (L) >60 ml/min/1.73m2    Calcium 7.1 (L) 8.5 - 10.1 MG/DL    Bilirubin, total 0.4 0.2 - 1.0 MG/DL    ALT (SGPT) 21 12 - 78 U/L    AST (SGOT) 33 15 - 37 U/L    Alk. phosphatase 150 (H) 45 - 117 U/L    Protein, total 5.2 (L) 6.4 - 8.2 g/dL    Albumin 2.3 (L) 3.5 - 5.0 g/dL    Globulin 2.9 2.0 - 4.0 g/dL    A-G Ratio 0.8 (L) 1.1 - 2.2     HCG URINE, QL    Collection Time: 03/11/17 12:35 PM   Result Value Ref Range    HCG urine, Ql. NEGATIVE  NEG     GLUCOSE, POC    Collection Time: 03/11/17 12:42 PM   Result Value Ref Range    Glucose (POC) >600 (HH) 65 - 100 mg/dL    Performed by Mague Le    LACTIC ACID, PLASMA    Collection Time: 03/11/17 12:45 PM   Result Value Ref Range    Lactic acid 4.3 (HH) 0.4 - 2.0 MMOL/L   BLOOD GAS, ARTERIAL    Collection Time: 03/11/17  1:40 PM   Result Value Ref Range    pH 6.92 (LL) 7.35 - 7.45      PCO2 17 (L) 35.0 - 45.0 mmHg    PO2 188 (H) 80 - 100 mmHg    O2 SAT 98 (H) 92 - 97 %    BICARBONATE 3 (L) 22 - 26 mmol/L    BASE DEFICIT 28.0 mmol/L    O2 METHOD VENTILATOR      FIO2 40 %    MODE A/C      Tidal volume 400      SET RATE 16      SPONTANEOUS RATE 8.0      EPAP/CPAP/PEEP 5.0      Sample source ARTERIAL      SITE LEFT RADIAL      DONN'S TEST YES      Critical value read back Nan Lesches MD        IMAGING RESULTS:    CT Results  (Last 48 hours)    None        PFT Results  (Last 48 hours)    None        Echo Results  (Last 48 hours)    None        CXR Results  (Last 48 hours)               03/11/17 1214  XR CHEST PORT Final result    Impression:  IMPRESSION: Endotracheal tube terminates at thoracic inlet. Narrative:  EXAM:  XR CHEST PORT       INDICATION:  s/p intubation       COMPARISON:  12/2/2015       FINDINGS: A portable AP radiograph of the chest was obtained at 1204 hours   hours.  There is interval placement of an endotracheal tube terminating at the   thoracic inlet. Minimal bibasilar linear assessment of atelectasis is shown in   the interval with otherwise clear lungs. There is no pneumothorax or pleural   effusion. The cardiac and mediastinal contours and pulmonary vascularity are   normal.  No hilar enlargement is shown. No substantial osseous abnormality is   shown. .                VENOUS DOPPLER results  (Last 48 hours)    None            MEDICATIONS GIVEN:  Medications   insulin regular (NOVOLIN R, HUMULIN R) 100 unit/mL injection (not administered)   sodium chloride (NS) flush 5-10 mL (not administered)   propofol (DIPRIVAN) infusion (25 mcg/kg/min × 58 kg IntraVENous New Bag 3/11/17 1358)   insulin regular (NOVOLIN R, HUMULIN R) 100 Units in 0.9% sodium chloride 100 mL infusion (10.8 Units/hr IntraVENous New Bag 3/11/17 1248)   glucose chewable tablet 16 g (not administered)   dextrose (D50W) injection syrg 12.5-25 g (not administered)   glucagon (GLUCAGEN) injection 1 mg (not administered)   piperacillin-tazobactam (ZOSYN) 3.375 g in 0.9% sodium chloride (MBP/ADV) 100 mL (not administered)   sodium chloride 0.9 % bolus infusion 2,000 mL (0 mL IntraVENous IV Completed 3/11/17 1239)   succinylcholine (ANECTINE) injection 60 mg (60 mg IntraVENous Given 3/11/17 1150)   etomidate (AMIDATE) 2 mg/mL injection 20 mg (20 mg IntraVENous Given 3/11/17 1150)   insulin regular (NOVOLIN R, HUMULIN R) injection 10 Units (10 Units IntraVENous Given 3/11/17 1146)   sodium bicarbonate 8.4 % (1 mEq/mL) injection 100 mEq (100 mEq IntraVENous Given 3/11/17 1223)   sodium chloride 0.9 % bolus infusion 1,000 mL (1,000 mL IntraVENous New Bag 3/11/17 1239)       IMPRESSION:  1. Diabetic ketoacidosis with coma associated with type 1 diabetes mellitus ([facility]        PLAN:  1. Admit    ADMIT NOTE:  2:00 PM  Patient is being admitted to the hospital by Dr. Laura Massey.   The results of their tests and reasons for their admission have been discussed with them and/or available family. They convey agreement and understanding for the need to be admitted and for their admission diagnosis. Consultation has been made with the inpatient physician specialist for hospitalization. This note is prepared by Abdon Bartholomew acting as Scribe for Jessica Bobby M.D. Jessica oBbby M.D: The scribe's documentation has been prepared under my direction and personally reviewed by me in its entirety. I confirm that the note above accurately reflects all work, treatment, procedures, and medical decision making performed by me.

## 2017-03-11 NOTE — CONSULTS
PULMONARY ASSOCIATES [facility] INTENSIVIST Consult Service Note  Pulmonary, Critical Care, and Sleep Medicine    Name: Byron Aguilar MRN: [id number]   : 1990 Hospital: [facility]   Date: 3/11/2017   Hospital Day: 1       Subjective/Interval History:   I have reviewed the flowsheet and previous days notes. Seen earlier today on rounds. Reviewed the evaluation and will assist in implementing plan as outlined by Dr. Dillon Isidro and team        IMPRESSION:   · Acute respiratory failure on vent- intubated on arrival for protection or airway  · Diabetes type I ([address] insulin pump; diagnosed age 7  · Stress gastritis- mild coffee ground emesis  · Acute renal failure-likely secondary to pre-renal due to dehydration and DKA  · Acute encephalopathy- toxic, drug induced or toxic?  UnresponsivenessAcute respiratory failure  · Diabetic Ketoacidosis in the setting of [id number]  · AGMA 34  · Lactic acidosis 4.3  · Sepsis, POA-unclear source  · UDS positive for cocaine  · Bipolar disorder continue lamictal when able to take PO intake-likely need psych input when able; certainly is not helped by the cocaine  · Pt is critically ill and at high risk of end organ dysfunction and failure  · CC time with pt eop  30   Minutes      Code Status: Full  Surrogate Decision Maker: Bucky Yan per facebook  at [phone]   DVT Prophylaxis: heparin   GI Prophylaxis: not indicated   Baseline: independent      RECOMMENDATIONS/PLAN:   · CCU  · Vent and intubation;  protection or airway  · fentanyl gtt, wean as tolerated  · Add precedex  · Restrain for safety  ·  vancomycin and zosyn, de-escalate as appropriate  · munoz placement for strict I/Os  · BMP, Mg, PO4, lactate q4h  · aggressive IVF fluid hydration with NS; change to D5 1/2NS when blood glucose <250 x 2 checks  · bicarb gtt, wean as tolerated  · insulin gtt until AG resolves; then overlap with SC insulin for 3-4h to avoid re-entry into DKA  · prn antiemetics   · diabetic education when able  · DVT, SUP prophylaxis  · Will be available to assist in medical management while in the CCU pending disposition     Code: Full Code      PCP:                                    Belinda Chappell MD       Subjective/Initial History:   I have reviewed the flowsheet and previous days notes. The patient is unable to give any meaningful history or review of systems because the patient is: Intubated on vent     Asked to see this 23 y.o. WF DM type 1 since age 7, current smoker on vent for Dr. Schneider Console in regards to acute respiratory failure, DKA and acidosis now in the CCU intubated, sedated on vent. Pt also has bipolar disorder and presentedto ED via EMS for unresponsiveness. Pt is intubated and sedated so hx was obtained thru chart review and friend at bedside. Per hx, pt was found to be \"normal\" last night at 11PM.  Friend found her unresponsive this AM with mottled skin so EMS called. On arrival, GCS~ 7, pt noted to have shallow respiration, not responding to 1mg narcan. At the time, BG ~ 600. Patient was subsequently intubated and started on insulin gtt. Vitals: T 93.2, P 142, /67 Labs: ,  Na 144, K 3.3, Cr 1.8, lactic acid 4.3    Pt was in ED [facility] last month for DKA after leaving [facility], having been admitted there for DKA. Her chart suggests multiple psychiatric issues, and non-compliance with medications. She presents here with severe DKA, ARF, SIRS, acute encephalopathy. Pt unresponsive for unclear duration, concerning for possible aspiration. -UA neg, BC obtained. Initial cxr neg for evidence of infiltration. PMH:  has a past medical history of ARF (acute renal failure) ([facility] (2/21/2017); Bipolar disorder ([facility] Contraception management; Depression; Diabetes in pregnancy; Diabetes type I ([facility] OTHER MEDICAL; OTHER MEDICAL; and Unspecified epilepsy without mention of intractable epilepsy ([facility]  She also has no past medical history of Abnormal Pap smear; Acquired hypothyroidism; Anemia NEC; Complication of anesthesia; Heart abnormalities; Infertility; Postpartum depression; Rhesus isoimmunization unspecified as to episode of care in pregnancy; Sickle-cell disease, unspecified; Trauma; Unspecified breast disorder; or Unspecified diseases of blood and blood-forming organs. PSH:   has a past surgical history that includes  delivery only (, 14). FHX: family history includes Cancer in her paternal grandfather; High Cholesterol in her maternal grandfather and maternal grandmother; Hypertension in her maternal grandfather and maternal grandmother; No Known Problems in her father and mother; Other in her sister. SHX:  reports that she has been smoking Cigarettes. She has a 3.50 pack-year smoking history. She has never used smokeless tobacco. She reports that she does not drink alcohol or use illicit drugs. ROS:Review of systems not obtained due to patient factors.     MAR reviewed and pertinent medications noted or modified as needed    Allergies   Allergen Reactions    Other Medication Rash     Tegaderm    Sulfa (Sulfonamide Antibiotics) Hives        MEDS:   Current Facility-Administered Medications   Medication    insulin regular (NOVOLIN R, HUMULIN R) 100 unit/mL injection    sodium chloride (NS) flush 5-10 mL    insulin regular (NOVOLIN R, HUMULIN R) 100 Units in 0.9% sodium chloride 100 mL infusion    glucose chewable tablet 16 g    dextrose (D50W) injection syrg 12.5-25 g    glucagon (GLUCAGEN) injection 1 mg    midazolam in normal saline (VERSED) 1 mg/mL infusion    sodium bicarbonate (8.4%) 150 mEq in sterile water 1,000 mL infusion    vancomycin (VANCOCIN) 1500 mg in  ml infusion    chlorhexidine (PERIDEX) 0.12 % mouthwash 10 mL    sodium chloride (NS) flush 5-10 mL    sodium chloride (NS) flush 5-10 mL    insulin regular (NOVOLIN R, HUMULIN R) 100 Units in 0.9% sodium chloride 100 mL infusion    insulin lispro (HUMALOG) injection    glucose chewable tablet 16 g    dextrose (D50W) injection syrg 12.5-25 g    glucagon (GLUCAGEN) injection 1 mg    acetaminophen (TYLENOL) tablet 650 mg    ondansetron (ZOFRAN) injection 4 mg    bisacodyl (DULCOLAX) tablet 5 mg    fentaNYL (PF) 900 mcg/30 ml infusion soln    0.9% sodium chloride infusion    dextrose 5 % - 0.2% NaCl infusion    piperacillin-tazobactam (ZOSYN) 3.375 g in 0.9% sodium chloride (MBP/ADV) 100 mL    [START ON 3/12/2017] piperacillin-tazobactam (ZOSYN) 3.375 g in 0.9% sodium chloride (MBP/ADV) 100 mL    [START ON 3/12/2017] vancomycin (VANCOCIN) 1,000 mg in 0.9% sodium chloride (MBP/ADV) 250 mL    dextrose 5 % - 0.45% NaCl infusion    sodium bicarbonate 8.4 % (1 mEq/mL) injection 100 mEq    pantoprazole (PROTONIX) 40 mg in sodium chloride 0.9 % 10 mL injection    [START ON 3/12/2017] enoxaparin (LOVENOX) injection 40 mg    metoprolol (LOPRESSOR) injection 2.5 mg    dexmedetomidine (PRECEDEX) 400 mcg in 0.9% sodium chloride 100 mL infusion          Telemetry:    sinus tach      Objective:     Vital Signs: Intake/Output: Intake/Output:   Visit Vitals    /50 (BP 1 Location: Right arm, BP Patient Position: At rest)    Pulse (!) 129    Temp 97.9 °F (36.6 °C)    Resp 20    Wt 58 kg (127 lb 13.9 oz)    SpO2 99%    BMI 23.39 kg/m2      O2 Device: Ventilator     Temp (24hrs), Av.6 °F (35.3 °C), Min:93.2 °F (34 °C), Max:97.9 °F (36.6 °C)     Intake/Output Summary (Last 24 hours) at 17 1642  Last data filed at 17 1600   Gross per 24 hour   Intake           376.94 ml   Output                0 ml   Net           376.94 ml    Last shift:       07 -  1900  In: 376.9 [I.V.:376.9]  Out: -   Last 3 shifts:           Hemodynamics:   PAP:   CO:     Wedge:   CI:     CVP:    SVR:       PVR:         Ventilator Settings:      Mode Rate TV Press PEEP FiO2 PIP Min.  Vent   Assist control    400 ml    5 cm H20 40 %              Physical Exam:    General: young lean WF on vent severely ill, uncooperative and combative   HEENT: intubated. Lip ring   Neck, Lymph: No abnormally enlarged lymph nodes. Chest: normal   Lungs: rhonchi   Heart: Regular rate and rhythm, tachycardic   Abdomen: soft, non-tender, without masses or organomegaly   : munoz   Extremity: negative, cyanosis, clubbing;     Neuro: lethargic, uncooperative   Psych: sedated but cannot assess, still restless   Skin: PallorSkin color, texture, turgor normal. No rashes or lesions;   Pulses: Bilateral, Radial, 2+ Normal upper and lower extremity pulses   Capillary refill: abnormal:  warm, painted nails;      Data:             Labs:    Recent Labs      03/11/17   1137   WBC  37.9*   HGB  13.1   PLT  354     Recent Labs      03/11/17   1245  03/11/17   1224   NA   --   142   K   --   3.8   CL   --   101   CO2   --   7*   GLU   --   964*   BUN   --   30*   CREA   --   1.80*   CA   --   7.1*   LAC  4.3*   --    ALB   --   2.3*   SGOT   --   33   ALT   --   21     Recent Labs      03/11/17   1340  03/11/17   1201   PH  6.92*   --    PCO2  17*   --    PO2  188*   --    HCO3  3*   --    FIO2  40  40     Recent Labs      03/11/17   1340  03/11/17   1201   PH  6.92*   --    PCO2  17*   --    PO2  188*   --    HCO3  3*   --    FIO2  40  40     No results for input(s): CPK, CKNDX, TROIQ in the last 72 hours. No lab exists for component: CPKMB    Imaging:  I have personally reviewed the patients radiographs and have reviewed the reports:          Results from Hospital Encounter encounter on 03/11/17   XR CHEST PORT   Narrative EXAM:  XR CHEST PORT    INDICATION:  central line placement    COMPARISON:  3/11/2017    FINDINGS: A portable AP radiograph of the chest was obtained at 1354 hours.   Endotracheal tube position is unchanged terminating at the thoracic inlet, there  is interval placement of a right IJ line terminating in the right atrium, which  can be pulled back 4 cm. There is no pneumothorax or pleural effusion. Ill-defined bibasilar patchy assessment of atelectasis is again shown, slightly  increased in the right. Cardiac, mediastinal and hilar contours remain normal.          Impression IMPRESSION: Right IJ line terminates in right atrium. Likely be pulled back 4  cm. Results from [place] encounter on 03/11/17   CT HEAD WO CONT   Narrative EXAM:  CT HEAD WO CONT    INDICATION:   Arrival via EMS from home. Found at home unresponsive with shallow  respiration and high blood glucose reading level. Recently admitted 2/21/2017  for diabetic ketoacidosis. COMPARISON: CT September 24, 2008    TECHNIQUE: Unenhanced CT of the head was performed using 5 mm images. Brain and  bone windows were generated. CT dose reduction was achieved through use of a  standardized protocol tailored for this examination and automatic exposure  control for dose modulation. FINDINGS:  The ventricles and sulci are normal in size, shape and configuration and  midline. There is no significant white matter disease. There is no intracranial  hemorrhage, extra-axial collection, mass, mass effect or midline shift. The  basilar cisterns are open. No acute infarct is identified. The bone windows  demonstrate no abnormalities. The visualized portions of the paranasal sinuses  and mastoid air cells are clear. Impression IMPRESSION: No acute findings               My assessment/plan was discussed with:  nursing    respiratory therapy Dr. rodríguez      High complexity decision making was performed during the evaluation of this patient at high risk for decompensation with multiple organ involvement. Complex decision making was performed which includes reviewing the patient's past medical records, current laboratory results, medications, ECG and actual Xray films, immediately available at the bedside to the patient      Total critical care time spent rendering care exclusive of procedures: 30 minutes    Jolanta Euceda MD

## 2017-03-11 NOTE — ED NOTES
Pt. Became combative and was reaching for ET tube. Patient titrated up to 45 mcg/kg/min of propofol and given 2mg versed Bolus.  Dr. Faye Cons also notified of hypotension; sedation medication being changed to versed drip to maintain adequate MAP

## 2017-03-12 NOTE — PROGRESS NOTES
PULMONARY ASSOCIATES [facility] INTENSIVIST Consult Service Note  Pulmonary, Critical Care, and Sleep Medicine    Name: Ludivina Chavira MRN: [id number]   : 1990 Hospital: [address]   Date: 3/12/2017   Hospital Day: 2       Subjective/Interval History:   I have reviewed the flowsheet and previous days notes. Seen earlier today on rounds. Reviewed the evaluation and will assist in implementing plan as outlined by Dr. Long Ruvalcaba and team    3/12 BP low this AM. Still on vent. Off bicarb      IMPRESSION:   · Acute respiratory failure on vent- intubated on arrival for protection or airway  · hypotension  · Diabetes type I (HCC) insulin pump for awhile; diagnosed age 7  · Stress gastritis- mild coffee ground emesis  · Hypernatremia  · Acute renal failure-likely secondary to pre-renal due to dehydration and DKA  · Acute encephalopathy- toxic, drug induced or toxic?  Unresponsiveness  · Diabetic Ketoacidosis in the setting of [id number]  · AGMA 34  · leukocytosis  · Lactic acidosis 4.3  · Sepsis, POA-unclear source  · UDS positive for cocaine  · Bipolar disorder continue lamictal when able to take PO intake-likely need psych input when able; certainly is not helped by the cocaine  · Pt is critically ill and at high risk of end organ dysfunction and failure  · CC time with pt eop  30   Minutes      Code Status: Full  Surrogate Decision Maker: Maryanne Yan-parent per facebook  at [phone]   DVT Prophylaxis: heparin   GI Prophylaxis: not indicated   Baseline: independent      RECOMMENDATIONS/PLAN:   · CCU  · CVP monitoring, fluids prn  · Vent and intubation;  protection or airway  · fentanyl gtt, wean as tolerated  · wean precedex  · Restrain for safety  · vancomycin and zosyn, de-escalate as appropriate  · munoz placement for strict I/Os  · insulin gtt for now, will ask DTC to help transition based on baseline needs  · prn antiemetics   · diabetic education when able  · DVT, SUP prophylaxis  · Will be available to assist in medical management while in the CCU pending disposition     Code: Full Code      PCP:                                    Lore Holland MD       Subjective/Initial History:   I have reviewed the flowsheet and previous days notes. The patient is unable to give any meaningful history or review of systems because the patient is: Intubated on vent     Asked to see this 23 y.o. WF DM type 1 since age 7, current smoker on vent for Dr. Maria De Jesus Younger in regards to acute respiratory failure, DKA and acidosis now in the CCU intubated, sedated on vent. Pt also has bipolar disorder and presentedto ED via EMS for unresponsiveness. Pt is intubated and sedated so hx was obtained thru chart review and friend at bedside. Per hx, pt was found to be \"normal\" last night at 11PM.  Friend found her unresponsive this AM with mottled skin so EMS called. On arrival, GCS~ 7, pt noted to have shallow respiration, not responding to 1mg narcan. At the time, BG ~ 600. Patient was subsequently intubated and started on insulin gtt. Vitals: T 93.2, P 142, /67 Labs: ,  Na 144, K 3.3, Cr 1.8, lactic acid 4.3    Pt was in ED [facility] last month for DKA after leaving [facility], having been admitted there for DKA. Her chart suggests multiple psychiatric issues, and non-compliance with medications. She presents here with severe DKA, ARF, SIRS, acute encephalopathy. Pt unresponsive for unclear duration, concerning for possible aspiration. -UA neg, BC obtained. Initial cxr neg for evidence of infiltration. PMH:  has a past medical history of ARF (acute renal failure) ([facility] (2/21/2017); Bipolar disorder ([facility] Contraception management; Depression; Diabetes in pregnancy; Diabetes type I ([address] OTHER MEDICAL; OTHER MEDICAL; and Unspecified epilepsy without mention of intractable epilepsy ([address] She also has no past medical history of Abnormal Pap smear; Acquired hypothyroidism;  Anemia NEC; Complication of anesthesia; Heart abnormalities; Infertility; Postpartum depression; Rhesus isoimmunization unspecified as to episode of care in pregnancy; Sickle-cell disease, unspecified; Trauma; Unspecified breast disorder; or Unspecified diseases of blood and blood-forming organs. PSH:   has a past surgical history that includes  delivery only (, 14). FHX: family history includes Cancer in her paternal grandfather; High Cholesterol in her maternal grandfather and maternal grandmother; Hypertension in her maternal grandfather and maternal grandmother; No Known Problems in her father and mother; Other in her sister. SHX:  reports that she has been smoking Cigarettes. She has a 3.50 pack-year smoking history. She has never used smokeless tobacco. She reports that she does not drink alcohol or use illicit drugs. ROS:Review of systems not obtained due to patient factors.     MAR reviewed and pertinent medications noted or modified as needed    Allergies   Allergen Reactions    Other Medication Rash     Tegaderm    Sulfa (Sulfonamide Antibiotics) Hives        MEDS:   Current Facility-Administered Medications   Medication    sodium chloride (NS) flush 5-10 mL    insulin regular (NOVOLIN R, HUMULIN R) 100 Units in 0.9% sodium chloride 100 mL infusion    glucose chewable tablet 16 g    glucagon (GLUCAGEN) injection 1 mg    midazolam in normal saline (VERSED) 1 mg/mL infusion    chlorhexidine (PERIDEX) 0.12 % mouthwash 10 mL    sodium chloride (NS) flush 5-10 mL    dextrose (D50W) injection syrg 12.5-25 g    acetaminophen (TYLENOL) tablet 650 mg    ondansetron (ZOFRAN) injection 4 mg    bisacodyl (DULCOLAX) tablet 5 mg    fentaNYL (PF) 900 mcg/30 ml infusion soln    0.9% sodium chloride infusion    dextrose 5 % - 0.2% NaCl infusion    piperacillin-tazobactam (ZOSYN) 3.375 g in 0.9% sodium chloride (MBP/ADV) 100 mL    vancomycin (VANCOCIN) 1,000 mg in 0.9% sodium chloride (MBP/ADV) 250 mL    dextrose 5 % - 0.45% NaCl infusion    pantoprazole (PROTONIX) 40 mg in sodium chloride 0.9 % 10 mL injection    enoxaparin (LOVENOX) injection 30 mg    metoprolol (LOPRESSOR) injection 2.5 mg    dexmedetomidine (PRECEDEX) 400 mcg in 0.9% sodium chloride 100 mL infusion    propofol (DIPRIVAN) infusion          Telemetry:    sinus tach      Objective:     Vital Signs: Intake/Output: Intake/Output:   Visit Vitals    BP (!) 85/40 (BP 1 Location: Right arm, BP Patient Position: At rest)    Pulse 90    Temp 98.2 °F (36.8 °C)    Resp 16    Wt 58 kg (127 lb 13.9 oz)    SpO2 100%    BMI 23.39 kg/m2      O2 Device: Endotracheal tube     Temp (24hrs), Av.9 °F (36.6 °C), Min:93.2 °F (34 °C), Max:100.2 °F (37.9 °C)     Intake/Output Summary (Last 24 hours) at 17 1112  Last data filed at 17 1100   Gross per 24 hour   Intake          5352.98 ml   Output             2330 ml   Net          3022.98 ml    Last shift:       0701 -  1900  In: 903.3 [I.V.:873.3]  Out: 185 [Urine:135]  Last 3 shifts: 03/10 190 -  0700  In: 4449.7 [I.V.:4349.7]  Out: 2145 [Urine:1495]         Hemodynamics:   PAP:   CO:     Wedge:   CI:     CVP:    SVR:       PVR:         Ventilator Settings:      Mode Rate TV Press PEEP FiO2 PIP Min. Vent   Assist control    400 ml 0 cm H2O  5 cm H20 40 %  19 cm H2O  6.7 l/min        Physical Exam:    General: young lean WF on vent severely ill, sedated   HEENT: intubated. Lip ring   Neck, Lymph: No abnormally enlarged lymph nodes.    Chest: normal   Lungs: rhonchi   Heart: Regular rate and rhythm, tachycardic   Abdomen: soft, non-tender, without masses or organomegaly   : munoz   Extremity: negative, cyanosis, clubbing;     Neuro: lethargic,    Psych: sedated but cannot assess, still restless   Skin: PallorSkin color, texture, turgor normal. No rashes or lesions;   Pulses: Bilateral, Radial, 2+ Normal upper and lower extremity pulses   Capillary refill: abnormal:  warm, painted nails;      Data:             Labs:    Recent Labs      03/11/17   1137   WBC  37.9*   HGB  13.1   PLT  354     Recent Labs      03/12/17   0422  03/12/17   0018  03/11/17   1953  03/11/17   1558   03/11/17   1245  03/11/17   1224   NA  148*  151*  151*  145   --    --   142   K  3.4*  3.4*  2.9*  3.2*   --    --   3.8   CL  111*  113*  113*  109*   --    --   101   CO2  27  28  26  8*   --    --   7*   GLU  164*  133*  209*  635*   --    --   964*   BUN  24*  24*  26*  29*   --    --   30*   CREA  1.76*  1.70*  1.87*  1.84*   --    --   1.80*   CA  7.2*  6.8*  7.1*  6.8*   --    --   7.1*   MG  1.9  2.0  1.5*  1.7   < >   --    --    PHOS   --    --    --   2.5*   --    --    --    LAC  1.8   --    --   3.5*   --   4.3*   --    ALB   --    --    --    --    --    --   2.3*   SGOT   --    --    --    --    --    --   33   ALT   --    --    --    --    --    --   21    < > = values in this interval not displayed. Recent Labs      03/12/17   0646  03/11/17 2008 03/11/17   1340   PH  7.37  7.43  6.92*   PCO2  49*  39  17*   PO2  160*  180*  188*   HCO3  27*  25  3*   FIO2  40  40  40     Recent Labs      03/12/17   0646  03/11/17 2008 03/11/17   1340   PH  7.37  7.43  6.92*   PCO2  49*  39  17*   PO2  160*  180*  188*   HCO3  27*  25  3*   FIO2  40  40  40     No results for input(s): CPK, CKNDX, TROIQ in the last 72 hours. No lab exists for component: CPKMB    Imaging:  I have personally reviewed the patients radiographs and have reviewed the reports:          Results from Hospital Encounter encounter on 03/11/17   XR CHEST PORT   Narrative EXAM:  XR CHEST PORT. INDICATION: Acute respiratory failure. COMPARISON: 3/11/2017. FINDINGS:   A portable AP radiograph of the chest was obtained at 0508 hours. Lines and tubes: The patient is on a cardiac monitor. The endotracheal tube and  right jugular catheter with tip over the right atrium are unchanged.  There is a  new nasogastric tube coursing through the esophagus. Lungs: There is mild atelectasis at the lung bases. The lungs are otherwise  clear. Pleura: There is no pneumothorax or pleural effusion. Mediastinum: The cardiac and mediastinal contours and pulmonary vascularity are  normal.  Bones and soft tissues: The bones and soft tissues are grossly within normal  limits. Impression IMPRESSION: New nasogastric tube. No significant change. Results from [place] encounter on 03/11/17   CT HEAD WO CONT   Narrative EXAM:  CT HEAD WO CONT    INDICATION:   Arrival via EMS from home. Found at home unresponsive with shallow  respiration and high blood glucose reading level. Recently admitted 2/21/2017  for diabetic ketoacidosis. COMPARISON: CT September 24, 2008    TECHNIQUE: Unenhanced CT of the head was performed using 5 mm images. Brain and  bone windows were generated. CT dose reduction was achieved through use of a  standardized protocol tailored for this examination and automatic exposure  control for dose modulation. FINDINGS:  The ventricles and sulci are normal in size, shape and configuration and  midline. There is no significant white matter disease. There is no intracranial  hemorrhage, extra-axial collection, mass, mass effect or midline shift. The  basilar cisterns are open. No acute infarct is identified. The bone windows  demonstrate no abnormalities. The visualized portions of the paranasal sinuses  and mastoid air cells are clear. Impression IMPRESSION: No acute findings               My assessment/plan was discussed with:  nursing    respiratory therapy Dr. rodríguez      High complexity decision making was performed during the evaluation of this patient at high risk for decompensation with multiple organ involvement. Complex decision making was performed which includes reviewing the patient's past medical records, current laboratory results, medications, ECG and actual Xray films, immediately available at the bedside to the patient      Total critical care time spent rendering care exclusive of procedures: 30 minutes    Luan Almaraz MD

## 2017-03-12 NOTE — PROGRESS NOTES
0710: Report received from [facility], [address] [place] [address].  Pt able to follow commands. 0809: propofol stopped for sbp in 77/39. Precedex paused at this time. Pt able to follow commands  0812: pt waking up 85/39 continue to hold propofol and precedex  0817: pt drowsy rass -1 to -2 pt able to follow commands. bp now 93/49,  0819: pt restless and pulling at restraints,  precedex restarted at . 2mcg/kg/hr. 8655: tylenol given for temp 100.2   0845: precedex increase to 0.03 for agitation. 8609: bp 82/49 Dr. Earnestine Dakin notified, new orders received, fentanyl decreased to 25mcg/hr. 7096: pt bp rise to 90/47  1149: CVP measured at 8, Dr. Earnestine Dakin aware, new orders received. 1200: pt bp 79/43, Dr. Earnestine Dakin aware, continue bolus and monitor bp response.   1300: bp 78/46, Dr Earnestine Dakin notified, new orders received. 1315: pt bp 86/48  1331: Baldomero gtt initiated at 10mcg/min. bp 95/54  1424: pt becoming very agitated, pulling at restraints, start precedex at 0.2   1427: agitation escalating despite talking to pt trying to calm her down  1435: pt continue to pull at restraints, biting ETT tube, increase precedex to 0.6mcg/kg/hr and increase fentanyl to 50 mcg/hr. bp 117/74  1530: increase baldomero to 30 mcg/min for bp 74/43  1534: increase baldomero to 50mcg/min for bp 82/50 CVP remains 8  1536: pt bp 94/55  1545: bg 128, no change to insulin gtt, remain at 0.7units/hr. 1715: labs send per order, troponin, mg, and BMP   1839: Dr. Darryn Reynoso paged for lab results  1848: Dr. Darryn Reynoso return call, new orders received.

## 2017-03-12 NOTE — PROGRESS NOTES
03/12/17 0647   ABCDE Bundle   SBT Safety Screen Passed Yes   SBT Trial Passed No   SBT Trial Reason for Failure (Patient was getting tired and requested to go back on)     Patient nodded she was getting tired of breathing and wanted the ventilator back the way it was.   HR was going to 140's

## 2017-03-12 NOTE — PROGRESS NOTES
1953 BMP drawn as ordered. 2000 Four runs of Potassium 10 meq/100 ml ordered to be given over 60 minutes each for the next 4 hours. First run hung. 2101 Second run of Potassium hung to infuse over one hour. 2109 NS infusion stopped and D5%-0.2% NaCl started at 125 ml/hr as ordered for FSBS < 250 for 2 consecutive hours   2135 Dr. Allie Manuel called and updated on blood Sodium, Magnesium and CO2 levels. Orders received to stop Bicarb gtt and Give Magnesium 1 gram IV over one hour. 2141 Sodium bicarbonate gtt stopped at this time. 2147 Magnesium sulfate 1 gram IV hung to infuse over one hour for Magnesium level of 1.5.  2208 Third run of Potassium hung to infuse over one hour. 2304 Fourth run of Potassium hung to infuse over one hour. 2309 Propofol reduced to 40 mcg/kg/min  0018 BMP drawn as ordered. 0200 The beginning of Daylight Saving Time occurred at 0200 hrs. Documentation of patient care and medications administered is done with respect to the time change. 0408 Propofol reduced to 30 mcg/kg/min  0451 Fentanyl reduced to 50 mcg/hr. 9473 Propofol stopped for SAT.  0625 Passed SAT. SBT started Spont. Mode on ventilator. Started to become agitated , trying to sit up, biting on ETT. Encouraged to take slow deep breaths. Started to calm down quickly with encouragement. 4826 Resp. Rate slowing down. When asked if she was getting tired she nodded \"yes\". Explained that the sedation would be restarted and the ventilator would be put back they way it was. Nodded \"yes\" again. Propofol restarted at 15 mcg/kg/min.     0700 Bedside and Verbal shift change report given to [facility] by Natalie Limon. Report included the following information Kardex, Intake/Output, MAR and Recent Results.

## 2017-03-12 NOTE — PROGRESS NOTES
Hospitalist Progress Note    NAME: Buddy Balderas   :  1990   MRN:  [id number]   Hospital day:  1      Hospitalist: Vivek Gunter MD       Assessment / Plan:  Acute respiratory failure, acute encephalopathy  -intubated in ED on arrival due to inability to protect airway, unresponsiveness  -likely in setting of significant metabolic abnormalities, including acidosis pH 6.9 initially  -management per intensivist  -precedex, fentanyl    Sepsis  Hypotension  Cocaine use  -hypothermic, tachycardic, WBC 37.9 initially  -now hypotensive requiring pressors  -unclear source, UA negative, cxr clear  -continue broad antibiotics  -will order echo and troponin given hypotension and cocaine on drug screen    DKA, DM1, lactic acidosis  -now anion gap is closed, glucose improved  -per RN - intensivest wants to keep insulin gtt on until extubated, cont D5 in fluids  -when extubated will transition to lantus  -will stop H6H metabolic panels as gap is closed change to q12h    YVROSE  -Cr 1.76 today, likely prerena  -continue fluids    Bipolar disorder  -lamictal when able to take PO  -will consult psych when more stable        Code status: Full  Prophylaxis: Lovenox  Recommended Disposition: Home w/Family     Subjective:     Chief Complaint: unresponsive  Intubated but able to nod yes or no to questions, denies any pain        Review of Systems:  Symptom Y/N Comments  Symptom Y/N Comments   Fever/Chills    Chest Pain     Poor Appetite    Edema     Cough    Abdominal Pain     Sputum    Joint Pain     SOB/PARMAR    Pruritis/Rash     Nausea/vomit    Tolerating PT/OT     Diarrhea    Tolerating Diet     Constipation    Other       Could NOT obtain due to: Intubated sedated     Objective:     VITALS:   Last 24hrs VS reviewed since prior progress note.  Most recent are:  Patient Vitals for the past 24 hrs:   Temp Pulse Resp BP SpO2   17 1615 - 71 16 106/68 100 %   17 1604 - 76 16 - 100 %   17 1600 - 78 16 104/65 100 %   03/12/17 1545 - 81 16 96/59 100 %   03/12/17 1539 - - - 94/55 -   03/12/17 1535 - - - (!) 82/50 -   03/12/17 1530 - 83 16 (!) 74/43 100 %   03/12/17 1500 98.5 °F (36.9 °C) 90 16 97/59 100 %   03/12/17 1445 - 89 16 105/67 100 %   03/12/17 1400 - 85 16 98/69 100 %   03/12/17 1345 - 83 16 96/65 -   03/12/17 1338 - 86 16 94/55 -   03/12/17 1330 - 88 16 95/54 -   03/12/17 1326 - 89 - (!) 86/48 -   03/12/17 1315 - 86 16 (!) 86/48 100 %   03/12/17 1300 - 85 16 (!) 78/46 100 %   03/12/17 1245 - 86 16 (!) 82/46 100 %   03/12/17 1213 - 87 16 (!) 81/44 100 %   03/12/17 1202 - 88 16 (!) 81/39 100 %   03/12/17 1201 - 86 16 (!) 79/41 100 %   03/12/17 1155 - 84 16 - 100 %   03/12/17 1100 98.2 °F (36.8 °C) 90 16 (!) 85/40 100 %   03/12/17 1000 - (!) 108 16 108/68 100 %   03/12/17 0947 - (!) 105 16 90/47 99 %   03/12/17 0934 - (!) 112 17 (!) 86/47 100 %   03/12/17 0900 - (!) 107 19 (!) 87/43 99 %   03/12/17 0731 - (!) 116 16 (!) 82/39 99 %   03/12/17 0700 100.2 °F (37.9 °C) (!) 125 16 93/50 99 %   03/12/17 0600 - (!) 108 16 95/53 99 %   03/12/17 0500 - (!) 104 16 93/48 99 %   03/12/17 0400 99 °F (37.2 °C) (!) 106 16 98/61 100 %   03/12/17 0335 - (!) 102 16 - 100 %   03/12/17 0300 - (!) 107 16 90/51 100 %   03/12/17 0100 - (!) 108 13 105/79 95 %   03/12/17 0000 98.3 °F (36.8 °C) (!) 112 16 92/51 99 %   03/11/17 2338 - (!) 113 16 - 99 %   03/11/17 2300 - (!) 117 16 90/53 99 %   03/11/17 2200 - (!) 118 16 93/55 99 %   03/11/17 2100 - (!) 122 16 98/53 99 %   03/11/17 2000 98.7 °F (37.1 °C) (!) 122 16 106/52 99 %   03/11/17 1956 - (!) 123 16 - 99 %   03/11/17 1900 - (!) 123 16 106/51 99 %   03/11/17 1800 - (!) 130 19 114/67 98 %   03/11/17 1745 - (!) 120 16 104/59 99 %   03/11/17 1700 - (!) 133 15 109/46 99 %       Intake/Output Summary (Last 24 hours) at 03/12/17 1629  Last data filed at 03/12/17 1600   Gross per 24 hour   Intake          7100.65 ml   Output             2603 ml   Net          4497.65 ml        PHYSICAL EXAM:  General: Intubated but responds   EENT:  EOMI. Anicteric sclerae. Mucous membranes moist  Resp:  CTA bilaterally, no wheezing or rales. No accessory muscle use  CV:  Regular rhythm, no edema  GI:  Soft, non distended, non tender. +Bowel sounds  Neurologic:  Sedated, but calm, nodding yes and no to questions, following commands  Psych:   Good insight, not anxious nor agitated  Skin:  No rashes, no jaundice  ________________________________________________________________________  Care Plan discussed with:    Comments   Patient x    Family      RN x    Care Manager     Consultant                        Multidiciplinary team rounds were held today with , nursing, pharmacist and clinical coordinator. Patient's plan of care was discussed; medications were reviewed and discharge planning was addressed. ________________________________________________________________________  Total NON critical care TIME:  25   Minutes  ________________________________________________________________________  Vivek Gunter MD     Procedures: see electronic medical records for all procedures/Xrays and details which were not copied into this note but were reviewed prior to creation of Plan. LABS:  I reviewed today's most current labs and imaging studies.   Pertinent labs include:  Recent Labs      03/11/17   1137   WBC  37.9*   HGB  13.1   HCT  43.3   PLT  354     Recent Labs      03/12/17   0422  03/12/17   0018  03/11/17   1953  03/11/17   1558   03/11/17   1224   NA  148*  151*  151*  145   --   142   K  3.4*  3.4*  2.9*  3.2*   --   3.8   CL  111*  113*  113*  109*   --   101   CO2  27  28  26  8*   --   7*   GLU  164*  133*  209*  635*   --   964*   BUN  24*  24*  26*  29*   --   30*   CREA  1.76*  1.70*  1.87*  1.84*   --   1.80*   CA  7.2*  6.8*  7.1*  6.8*   --   7.1*   MG  1.9  2.0  1.5*  1.7   < >   --    PHOS   --    --    --   2.5*   --    --    ALB   --    --    --    --    --   2.3*   TBILI   -- --    --    --    --   0.4   SGOT   --    --    --    --    --   33   ALT   --    --    --    --    --   21    < > = values in this interval not displayed.        Signed: Brandi Ramirez MD

## 2017-03-13 NOTE — ACP (ADVANCE CARE PLANNING)
Responded to request to assist patient with completion of an Advance Medical Directive. Patient would like her roommate, Sherlyn Lee, to be her medical power of . She then listed a friend, Enrike Hickey, as her successor agent. I asked her specifically about her mother, and she said she would like her mother to be contacted if something ever happened to her, but did not wish for her mother to be her medical power of . I am documenting this in my note. AMD completed with patient and witness by NANCY Mejia. Copy placed in patient's medical chart for scanning and original and two copies given to patient for her medical records. Patient did not have phone numbers for her friends for me to list on the Advance Medical Directive. I informed patient that should she desire to make changes to her advance Directive that she would need to notify Cade Johnson. Pastoral care will continue to follow as needed for spiritual/emotional needs. Please page [phone] as needed. Visit by: Elda Brunson. Mariel Yeh.  Reina Vargas MA, [facility]    Lead  Profession Development & Advancement

## 2017-03-13 NOTE — PROGRESS NOTES
[facility] INTENSIVIST Consult Service Note  Pulmonary, Critical Care, and Sleep Medicine    Name: Corona Moore MRN: [id number]   : 1990 Hospital: [facility]   Date: 3/13/2017   Hospital Day: 3       Subjective/Interval History:   I have reviewed the flowsheet and previous days notes. Seen earlier today on rounds. Reviewed the evaluation and will assist in implementing plan as outlined by Dr. Ced Nolan and team    3/12 BP low this AM. Still on vent. Off bicarb  3/13 gfap closed. Awke, passed SAT, SBT      IMPRESSION:   · Acute respiratory failure on vent- intubated on arrival for protection or airway  · Hypotension on IV vamsi  · Diabetes type I (HCC) insulin pump for awhile; diagnosed age 7  · Stress gastritis- mild coffee ground emesis  · Hypernatremia  · Acute renal failure-likely secondary to pre-renal due to dehydration and DKA  · Acute encephalopathy- toxic, drug induced or toxic?  Unresponsiveness  · Diabetic Ketoacidosis in the setting of [id number]  · AGMA 34  · leukocytosis  · Lactic acidosis 4.3  · Sepsis, POA-unclear source  · UDS positive for cocaine  · Bipolar disorder continue lamictal when able to take PO intake-likely need psych input when able; certainly is not helped by the cocaine    Code Status: Full  Surrogate Decision Maker: Maryanne Anna-parent per facebook  at [phone]   DVT Prophylaxis: heparin   GI Prophylaxis: not indicated   Baseline: independent      RECOMMENDATIONS/PLAN:   · CCU  · Extubate  · Advance diet  · Restart basal insulin  · CVP monitoring, fluids prn  · wean precedex  · Wean vamsi  · vancomycin and zosyn, de-escalate as appropriate  · munoz placement for strict I/Os  · prn antiemetics   · diabetic education when able  · DVT, SUP prophylaxis  · Will be available to assist in medical management while in the CCU pending disposition     Code: Full Code      PCP:                                    Rosina Wolfe MD Subjective/Initial History:   I have reviewed the flowsheet and previous days notes. The patient is unable to give any meaningful history or review of systems because the patient is: Intubated on vent     Asked to see this 23 y.o. WF DM type 1 since age 7, current smoker on vent for Dr. Arina Cruz in regards to acute respiratory failure, DKA and acidosis now in the CCU intubated, sedated on vent. Pt also has bipolar disorder and presentedto ED via EMS for unresponsiveness. Pt is intubated and sedated so hx was obtained thru chart review and friend at bedside. Per hx, pt was found to be \"normal\" last night at 11PM.  Friend found her unresponsive this AM with mottled skin so EMS called. On arrival, GCS~ 7, pt noted to have shallow respiration, not responding to 1mg narcan. At the time, BG ~ 600. Patient was subsequently intubated and started on insulin gtt. Vitals: T 93.2, P 142, /67 Labs: ,  Na 144, K 3.3, Cr 1.8, lactic acid 4.3    Pt was in ED [facility] last month for DKA after leaving [facility], having been admitted there for DKA. Her chart suggests multiple psychiatric issues, and non-compliance with medications. She presents here with severe DKA, ARF, SIRS, acute encephalopathy. Pt unresponsive for unclear duration, concerning for possible aspiration. -UA neg, BC obtained. Initial cxr neg for evidence of infiltration. PMH:  has a past medical history of ARF (acute renal failure) ([facility] [address] (2/21/2017); Bipolar disorder ([facility] [address] Contraception management; Depression; Diabetes in pregnancy; Diabetes type I ([address] OTHER MEDICAL; OTHER MEDICAL; and Unspecified epilepsy without mention of intractable epilepsy ([address] She also has no past medical history of Abnormal Pap smear; Acquired hypothyroidism; Anemia NEC; Complication of anesthesia; Heart abnormalities; Infertility; Postpartum depression; Rhesus isoimmunization unspecified as to episode of care in pregnancy;  Sickle-cell disease, unspecified; Trauma; Unspecified breast disorder; or Unspecified diseases of blood and blood-forming organs. PSH:   has a past surgical history that includes  delivery only (, 13). FHX: family history includes Cancer in her paternal grandfather; High Cholesterol in her maternal grandfather and maternal grandmother; Hypertension in her maternal grandfather and maternal grandmother; No Known Problems in her father and mother; Other in her sister. SHX:  reports that she has been smoking Cigarettes. She has a 3.50 pack-year smoking history. She has never used smokeless tobacco. She reports that she does not drink alcohol or use illicit drugs. ROS:Review of systems not obtained due to patient factors.     MAR reviewed and pertinent medications noted or modified as needed    Allergies   Allergen Reactions    Other Medication Rash     Tegaderm    Sulfa (Sulfonamide Antibiotics) Hives        MEDS:   Current Facility-Administered Medications   Medication    mupirocin (BACTROBAN) 2 % ointment    glucose chewable tablet 16 g    dextrose (D50W) injection syrg 12.5-25 g    glucagon (GLUCAGEN) injection 1 mg    insulin lispro (HUMALOG) injection 3 Units    insulin lispro (HUMALOG) injection    insulin NPH (NOVOLIN N, HUMULIN N) injection 15 Units    lamoTRIgine (LaMICtal) tablet 200 mg    PHENYLephrine (NEOSYNEPHRINE) 30,000 mcg in 0.9% sodium chloride 250 mL infusion    sodium chloride (NS) flush 5-10 mL    insulin regular (NOVOLIN R, HUMULIN R) 100 Units in 0.9% sodium chloride 100 mL infusion    chlorhexidine (PERIDEX) 0.12 % mouthwash 10 mL    sodium chloride (NS) flush 5-10 mL    dextrose (D50W) injection syrg 12.5-25 g    acetaminophen (TYLENOL) tablet 650 mg    ondansetron (ZOFRAN) injection 4 mg    bisacodyl (DULCOLAX) tablet 5 mg    piperacillin-tazobactam (ZOSYN) 3.375 g in 0.9% sodium chloride (MBP/ADV) 100 mL    dextrose 5 % - 0.45% NaCl infusion    pantoprazole (PROTONIX) 40 mg in sodium chloride 0.9 % 10 mL injection    enoxaparin (LOVENOX) injection 30 mg    metoprolol (LOPRESSOR) injection 2.5 mg          Telemetry:    sinus tach      Objective:     Vital Signs: Intake/Output: Intake/Output:   Visit Vitals    BP 99/59    Pulse 74    Temp 98.4 °F (36.9 °C)    Resp 13    Wt 58 kg (127 lb 13.9 oz)    SpO2 100%    BMI 23.39 kg/m2      O2 Device: Endotracheal tube, Ventilator     Temp (24hrs), Av.7 °F (37.1 °C), Min:98.4 °F (36.9 °C), Max:99.3 °F (37.4 °C)     Intake/Output Summary (Last 24 hours) at 17 1126  Last data filed at 17 1055   Gross per 24 hour   Intake          5439.18 ml   Output             1493 ml   Net          3946.18 ml    Last shift:       0701 -  1900  In: -   Out: 350 [Urine:350]  Last 3 shifts:  190 -  0700  In: 9022.1 [I.V.:8657.1]  Out: 2248 [Urine:1648]         Hemodynamics:   PAP:   CO:     Wedge:   CI:     CVP:  CVP (mmHg): 13 mmHg (17 0000) SVR:       PVR:         Ventilator Settings:      Mode Rate TV Press PEEP FiO2 PIP Min. Vent   CPAP    400 ml 5 cm H2O  5 cm H20 40 %  11 cm H2O  4.52 l/min        Physical Exam:    General: young lean WF on vent  ill, awake   HEENT: intubated. Lip ring   Neck, Lymph: No abnormally enlarged lymph nodes.    Chest: normal   Lungs: rhonchi   Heart: Regular rate and rhythm, tachycardic   Abdomen: soft, non-tender, without masses or organomegaly   : munoz   Extremity: negative, cyanosis, clubbing;     Neuro: lethargic,    Psych: sedated but cannot assess, still restless   Skin: PallorSkin color, texture, turgor normal. No rashes or lesions;   Pulses: Bilateral, Radial, 2+ Normal upper and lower extremity pulses   Capillary refill: abnormal:  warm, painted nails;      Data:             Labs:    Recent Labs      17   0350  17   1137   WBC  10.9  37.9*   HGB  10.8*  13.1   PLT  157  354     Recent Labs      17   0350  17   1717  17 0422   03/11/17   1558  03/11/17   1245  03/11/17   1224   NA  147*  148*  148*   < >  145   --   142   K  3.5  3.0*  3.4*   < >  3.2*   --   3.8   CL  115*  113*  111*   < >  109*   --   101   CO2  23  25  27   < >  8*   --   7*   GLU  182*  158*  164*   < >  635*   --   964*   BUN  14  19  24*   < >  29*   --   30*   CREA  1.37*  1.64*  1.76*   < >  1.84*   --   1.80*   CA  7.3*  7.3*  7.2*   < >  6.8*   --   7.1*   MG  1.9  1.9  1.9   < >  1.7   --    --    PHOS   --    --    --    --   2.5*   --    --    LAC   --    --   1.8   --   3.5*  4.3*   --    ALB   --    --    --    --    --    --   2.3*   SGOT   --    --    --    --    --    --   33   ALT   --    --    --    --    --    --   21    < > = values in this interval not displayed. Recent Labs      03/12/17   0646  03/11/17 2008 03/11/17   1340   PH  7.37  7.43  6.92*   PCO2  49*  39  17*   PO2  160*  180*  188*   HCO3  27*  25  3*   FIO2  40  40  40     Recent Labs      03/12/17   0646  03/11/17 2008 03/11/17   1340   PH  7.37  7.43  6.92*   PCO2  49*  39  17*   PO2  160*  180*  188*   HCO3  27*  25  3*   FIO2  40  40  40     Recent Labs      03/13/17   0350  03/12/17   1717   TROIQ  <0.04  0.11*       Imaging:  I have personally reviewed the patients radiographs and have reviewed the reports:          Results from Hospital Encounter encounter on 03/11/17   XR CHEST PORT   Narrative EXAM:  XR CHEST PORT    INDICATION:  Tube/line placement    COMPARISON:  Chest x-ray 3/12/2017. FINDINGS: A portable AP radiograph of the chest was obtained at 0:45 hours. The  patient is on a cardiac monitor. The endotracheal tube projects over the  tracheal lucency with the tip approximately 1 cm above the kiko. Enteric tube  traverses expected course to below the diaphragm into the left upper quadrant. Central venous catheter projects in stable and expected course for right  internal jugular vein approach with the tip projecting over the right atrium.   The lungs are low volume with focal vascular crowding and bibasilar atelectasis  but otherwise appear clear. The cardiac and mediastinal contours and pulmonary  vascularity are normal.  The chest wall structures and visualized upper abdomen  appear stable with no acute interval change. Impression IMPRESSION: Tubes and lines as above with endotracheal tube tip about 1 cm above  the kiko. Results from [place] encounter on 03/11/17   CT HEAD WO CONT   Narrative EXAM:  CT HEAD WO CONT    INDICATION:   Arrival via EMS from home. Found at home unresponsive with shallow  respiration and high blood glucose reading level. Recently admitted 2/21/2017  for diabetic ketoacidosis. COMPARISON: CT September 24, 2008    TECHNIQUE: Unenhanced CT of the head was performed using 5 mm images. Brain and  bone windows were generated. CT dose reduction was achieved through use of a  standardized protocol tailored for this examination and automatic exposure  control for dose modulation. FINDINGS:  The ventricles and sulci are normal in size, shape and configuration and  midline. There is no significant white matter disease. There is no intracranial  hemorrhage, extra-axial collection, mass, mass effect or midline shift. The  basilar cisterns are open. No acute infarct is identified. The bone windows  demonstrate no abnormalities. The visualized portions of the paranasal sinuses  and mastoid air cells are clear.          Impression IMPRESSION: No acute findings               My assessment/plan was discussed with:  nursing    respiratory therapy Dr. rodríguez      Complex decision making was performed which includes reviewing the patient's past medical records, current laboratory results, medications, ECG and actual Xray films, immediately available at the bedside to the patient      Vito Rutledge MD

## 2017-03-13 NOTE — PROGRESS NOTES
Spiritual Care Assessment/Progress Notes    Gifty Carcamo [id number]  [id number]    1990  23 y.o.  female    Patient Telephone Number: [phone] (home)   Quaker Affiliation: No Adventist   Language: English   Extended Emergency Contact Information  Primary Emergency Contact: Maryanne Yan  Home Phone: [phone]  Relation: Parent   Patient Active Problem List    Diagnosis Date Noted    DKA, type 1 ([facility] 03/11/2017    [facility] 02/21/2017    Dehydration 02/21/2017    Acidosis, metabolic 31/28/5357    Leukocytosis 02/21/2017    Acute encephalopathy 02/21/2017    Lactic acidemia 02/21/2017    Sinus tachycardia 02/21/2017    SIRS (systemic inflammatory response syndrome) ([facility] 02/21/2017    Mixed hyperlipidemia 08/03/2016    Insulin pump in place 08/03/2016    Seizure disorder ([facility] 12/25/2015    Anxiety disorder 12/25/2015    OCD (obsessive compulsive disorder) 12/25/2015    Partial epilepsy with impairment of consciousness ([facility] 02/12/2015    DKA, type 1, not at goal [facility])     Bipolar disorder ([facility]         Date: 3/13/2017       Level of Quaker/Spiritual Activity:  []         Involved in swetha tradition/spiritual practice    []         Not involved in swetha tradition/spiritual practice  []         Spiritually oriented    [x]         Claims no spiritual orientation    []         seeking spiritual identity  []         Feels alienated from Holiness practice/tradition  []         Feels angry about Holiness practice/tradition  []         Spirituality/Holiness tradition a resource for coping at this time.   []         Not able to assess due to medical condition    Services Provided Today:  []         crisis intervention    []         reading Scriptures  []         spiritual assessment    []         prayer  []         empathic listening/emotional support  []         rites and rituals (cite in comments)  []         life review     []         Holiness support  [] theological development   []         advocacy  []         ethical dialog     []         blessing  []         bereavement support    []         support to family  []         anticipatory grief support   [x]         help with AMD  []         spiritual guidance    []         meditation      Spiritual Care Needs  [x]         Emotional Support  []         Spiritual/Zoroastrianism Care  []         Loss/Adjustment  []         Advocacy/Referral                /Ethics  []         No needs expressed at               this time  []         Other: (note in               comments)  [address]  []         Follow up visits with               pt/family  []         Provide materials  []         Schedule sacraments  []         Contact Community               Clergy  [x]         Follow up as needed  []         Other: (note in               comments)     Comments:   Responded to request to assist patient with completion of an Advance Medical Directive. Patient would like her roommate, Kristen Oneil, to be her medical power of . She then listed a friend, Eli Kingsley, as her successor agent. I asked her specifically about her mother, and she said she would like her mother to be contacted if something ever happened to her, but did not wish for her mother to be her medical power of . I am documenting this in my note. AMD completed with patient and witness by RN Jt Gallegos. Copy placed in patient's medical chart for scanning and original and two copies given to patient for her medical records. Patient did not have phone numbers for her friends for me to list on the Advance Medical Directive. I informed patient that should she desire to make changes to her advance Directive that she would need to notify [facility]. Pastoral care will continue to follow as needed for spiritual/emotional needs. Please page [phone] as needed. Visit by: Israel Osuna.  Reina Vargas MA, [facility]    Lead  Profession Development & Advancement

## 2017-03-13 NOTE — DIABETES MGMT
DTC Progress Note    Recommendations/ Comments: Pt discussed with rounding team and Dr. Pallavi Flores. Plan to transition from insulin gtt to home regimen of NPH 15units Q12hrs and humalog 3units ac tid plus high sensitivity correction. Discontinue insulin gtt and dextrose IVF at least 1hr after administration of NPH. Pt is currently on NPH 15units Q12hrs and regular insulin 1unit:30gm CHO ration plus correction at home. Pt is well known to the DTC and very knowledgeable regarding her DM. Pt has been seen numerous times in the past in the outpatient DTC office when she was on her insulin pump. She was last seen in the hospital on 2/23/17- please see note for details. She is followed by Dr. Hallie Sabillon as an outpt. Chart reviewed on Ludivina Chavira during Multidisciplinary Rounds. A1c:   Lab Results   Component Value Date/Time    Hemoglobin A1c 14.9 03/11/2017 12:15 PM           Recent Glucose Results:   Lab Results   Component Value Date/Time     (H) 03/13/2017 03:50 AM     (H) 03/12/2017 05:17 PM    GLUCPOC 281 (H) 03/13/2017 09:43 AM    GLUCPOC 312 (H) 03/13/2017 08:46 AM    GLUCPOC 196 (H) 03/13/2017 07:31 AM        Lab Results   Component Value Date/Time    Creatinine 1.37 03/13/2017 03:50 AM       Active Orders   Diet    DIET NPO        PO intake: No data found. Will continue to follow as needed. Thank you.   SHIRA Ibarra, RN, [address]

## 2017-03-13 NOTE — PROGRESS NOTES
Pharmacy Medication Reconciliation     The patient was interviewed regarding current PTA medication list, use and drug allergies. The patient was questioned regarding use of any other inhalers, topical products, over the counter medications, herbal medications, vitamin products or ophthalmic/nasal/otic medication use. Recommendations/Findings: The following amendments were made to the patient's active medication list on file at [facility]:     1) Changes:   - Changed insulin lispro (Humalog) from use in insulin pump to 1 unit per 30 carbohydrates   - Decreased lamotrigine from 200 mg twice daily to 200 mg once daily per patient report. However, she states that she currently cannot afford this medication and has not taken it in several weeks. 2) Patient also states that she is suppose to take atorvastatin 20 mg once daily but cannot afford this medication either and has not taken it in several months.     -Clarified PTA med list with patient. PTA medication list was corrected to the following:     Prior to Admission Medications   Prescriptions Last Dose Informant Patient Reported? Taking?   insulin NPH (NOVOLIN N, HUMULIN N) 100 unit/mL injection   No Yes   Sig: 15 Units by SubCUTAneous route Before breakfast and dinner. insulin lispro (HUMALOG) 100 unit/mL injection   Yes Yes   Sig: by SubCUTAneous route. 1 unit per 30 carbs   insulin syringe-needle U-100 0.3 mL 31 gauge x 15/64\" syrg   No No   Si Each by SubCUTAneous route five (5) times daily. Follow-up needed for refills   lamoTRIgine (LAMICTAL) 200 mg tablet   Yes No   Sig: Take 200 mg by mouth daily.       Facility-Administered Medications: None     Thank you,  Roel Sung  PharmD Candidate [id number]

## 2017-03-13 NOTE — PROGRESS NOTES
Attended Interdisciplinary rounds in Critical Care Unit, where patient care was discussed. Visit by: Oriana Torres. Mina Arana.  Yosvany Owens MA, [address]

## 2017-03-13 NOTE — PROGRESS NOTES
03/13/17 0657   ABCDE Bundle   SBT Safety Screen Passed Yes   SBT Trial Passed Yes   Weaning Parameters   Spontaneous Breathing Trial Complete Yes   Resp Rate Observed 10   Ve 4.5   VT 1084   RSBI 15     Patient will be extubated to 3lpm nc

## 2017-03-13 NOTE — PROGRESS NOTES
Hospitalist Progress Note    NAME: Gifty Carcamo   :  1990   MRN:  [id number]   Hospital day:  2      Hospitalist: Brandi Ramirez MD       Assessment / Plan:  Acute respiratory failure, acute encephalopathy  -intubated in ED on arrival due to inability to protect airway, unresponsiveness  -likely in setting of significant metabolic abnormalities, including acidosis pH 6.9 initially  -resolving, extubated today  -order to remove munoz    Sepsis  Hypotension  Cocaine use  -hypothermic, tachycardic, WBC 37.9 initially trending down now to 10.9  -unclear source, UA negative, cxr clear  -continue broad antibiotics, consider d/c if cultures remain negative  -troponin okay, and echo okay     DKA, DM1, lactic acidosis  -now anion gap is closed, glucose improved  -switched to NPH insulin    YVROSE  -Cr 1.3 today, likely prerenal, overall improving  -continue fluids    Bipolar disorder  -lamictal  -consider psych consult      Code status: Full  Prophylaxis: Lovenox  Recommended Disposition: Home w/Family     Subjective:     Chief Complaint: unresponsive  Extubated, feeling better, denies any pain says she has been compliant with her insulin at home    Review of Systems:  Symptom Y/N Comments  Symptom Y/N Comments   Fever/Chills    Chest Pain     Poor Appetite    Edema     Cough    Abdominal Pain     Sputum    Joint Pain     SOB/PARMAR    Pruritis/Rash     Nausea/vomit    Tolerating PT/OT     Diarrhea    Tolerating Diet     Constipation    Other       Could NOT obtain due to: Intubated sedated     Objective:     VITALS:   Last 24hrs VS reviewed since prior progress note.  Most recent are:  Patient Vitals for the past 24 hrs:   Temp Pulse Resp BP SpO2   17 1700 - (!) 104 17 108/65 100 %   17 1630 98.4 °F (36.9 °C) 98 14 91/58 96 %   17 1600 - 98 18 (!) 84/61 100 %   17 1500 - 97 17 95/52 100 %   17 1430 - 94 16 91/52 -   17 1400 - 90 17 90/57 100 %   17 1300 - 87 17 93/57 98 % 03/13/17 1200 97.7 °F (36.5 °C) 100 15 102/59 100 %   03/13/17 1100 - 80 14 98/63 99 %   03/13/17 1000 - 74 13 99/59 99 %   03/13/17 0900 - 78 21 102/60 100 %   03/13/17 0800 98.4 °F (36.9 °C) 70 12 105/65 100 %   03/13/17 0706 - 92 12 - 99 %   03/13/17 0700 - 78 11 - 98 %   03/13/17 0657 - 68 10 - 100 %   03/13/17 0600 - (!) 57 16 105/70 100 %   03/13/17 0500 - 67 17 101/66 100 %   03/13/17 0421 - 69 16 - 100 %   03/13/17 0400 98.7 °F (37.1 °C) 72 18 96/58 100 %   03/13/17 0300 - 63 16 99/68 100 %   03/13/17 0200 - 66 16 103/73 100 %   03/13/17 0100 - 73 16 95/63 100 %   03/13/17 0000 99.3 °F (37.4 °C) 67 16 106/72 100 %   03/12/17 2326 - 68 16 - 100 %   03/12/17 2300 - 73 16 101/68 100 %   03/12/17 2200 - 75 16 101/66 100 %   03/12/17 2100 - 77 16 105/66 100 %   03/12/17 2018 - 77 16 - 100 %   03/12/17 2000 98.7 °F (37.1 °C) 76 16 103/64 100 %   03/12/17 1900 - 73 16 106/70 100 %   03/12/17 1845 - 74 16 107/69 100 %   03/12/17 1815 - 75 16 108/69 100 %       Intake/Output Summary (Last 24 hours) at 03/13/17 1802  Last data filed at 03/13/17 1708   Gross per 24 hour   Intake          4244.14 ml   Output             1405 ml   Net          2839.14 ml        PHYSICAL EXAM:  General: Alert oriented, now extubated  EENT:  EOMI. Anicteric sclerae. Mucous membranes moist  Resp:  CTA bilaterally, no wheezing or rales. No accessory muscle use  CV:  Regular rhythm, no edema  GI:  Soft, non distended, non tender. +Bowel sounds  Neurologic:  Alert, speech normal, face symmetric following commands  Psych:   Good insight, not anxious nor agitated  Skin:  No rashes, no jaundice  ________________________________________________________________________  Care Plan discussed with:    Comments   Patient x    Family      RN     Care Manager     Consultant                        Multidiciplinary team rounds were held today with , nursing, pharmacist and clinical coordinator.   Patient's plan of care was discussed; medications were reviewed and discharge planning was addressed. ________________________________________________________________________  Total NON critical care TIME:  25   Minutes  ________________________________________________________________________  Vivek Gunter MD     Procedures: see electronic medical records for all procedures/Xrays and details which were not copied into this note but were reviewed prior to creation of Plan. LABS:  I reviewed today's most current labs and imaging studies. Pertinent labs include:  Recent Labs      03/13/17   0350  03/11/17   1137   WBC  10.9  37.9*   HGB  10.8*  13.1   HCT  31.3*  43.3   PLT  157  354     Recent Labs      03/13/17   1657  03/13/17   0350  03/12/17   1717   03/11/17   1558  03/11/17   1224   NA  145  147*  148*   < >  145  142   K  3.6  3.5  3.0*   < >  3.2*  3.8   CL  113*  115*  113*   < >  109*  101   CO2  25  23  25   < >  8*  7*   GLU  258*  182*  158*   < >  635*  964*   BUN  16  14  19   < >  29*  30*   CREA  1.07*  1.37*  1.64*   < >  1.84*  1.80*   CA  7.7*  7.3*  7.3*   < >  6.8*  7.1*   MG  1.8  1.9  1.9   < >  1.7   --    PHOS   --    --    --    --   2.5*   --    ALB   --    --    --    --    --   2.3*   TBILI   --    --    --    --    --   0.4   SGOT   --    --    --    --    --   33   ALT   --    --    --    --    --   21    < > = values in this interval not displayed.        Signed: Vivek Gunter MD

## 2017-03-13 NOTE — PROGRESS NOTES
0700  Bedside and verbal report from Manuela Gallegos RN.  0800  Assessment completed; patient with flat affect; voice hoarse. On insulin drip per glucostabilizer protocol. Weaning vamsi. 0900  Taking a few ice chips without difficulty; Dr. Ana M Guerrero at bedside. 1000  Resting quietly in the bed; vitals stable. 1100  NPH insulin given per order. Will continue insulin drip x 1 more hour per YESENIA Smith. Echo at bedside. 1200  Insulin off; continue to wean vamsi. [address] fair. No further changes noted at this time. 1400   at bedside with patient. 1500  Patient on phone, tearful at times. 1600  Reassessment completed. Friend now at bedside. 1708  Sampson removed per patient request. (She is extubated, alert, oriented). 1800  Took diet fair. Vitals stable. 1900 Bedside and Verbal shift change report given to Aureliano Vogt RN (oncoming nurse) by Deanne Le RN  (offgoing nurse). Report included the following information SBAR, Kardex, Intake/Output, MAR, Accordion, Recent Results, Med Rec Status, Cardiac Rhythm ST and Alarm Parameters .

## 2017-03-13 NOTE — PROGRESS NOTES
1900 Resting comfortably. Precedex gtt at 0.6 mcg/kg/min, Baldomero gtt at 50 mcg/min, Fentanyl at 50 mcg/min, D5-0.2% NS at 125 ml/hr. And Insulin gtt. 1937 Potassium 10 meq / 100 ml IV every hour X 3 doses ordered. Started at this time. Maintenance IV changed to D5- 0.2% NS with KCL 20 meq at 125/hr. 0013 Precedex gtt reduced by half to 0.3 mcg/kg/min.  0105 Tylenol 2 tablets given down NGT due to elevated temperature. [address] reduced to 0.2 mcg/kg/min due to heart rate in 60's. 0400 Alert. Writing notes. Asked \"Where am I\". Wrote, \"I can breathe but it hurts\". Updated on the events that brought her to CCU. Explained the process of extubation. Nodded her head in understanding. 0531 Insulin gtt stopped per glucostabilizer FSBS 116  8456 Precedex stopped due to heart rate of 57.  0632 Insulin gtt remains off. FSBS 138  0600 Alert. Writing notes. SAT passed. 3293 SBT began.  0706 Extubated without problem. Nasal Cannula @ 3 l/min started. NG tube removed at this time also.

## 2017-03-14 NOTE — PROGRESS NOTES
Problem: Diabetes Self-Management  Goal: *Developing strategies to promote health/change behavior  Outcome: Progressing Towards Goal  D/W patient events leading up to admission and non-compliance. Patient reports medication access is really an issue.

## 2017-03-14 NOTE — PROGRESS NOTES
[facility]OC[facility] INTENSIVIST Consult Service Note  Pulmonary, Critical Care, and Sleep Medicine    Name: Gifty Carcamo MRN: [id number]   : 1990 Hospital: [address]   Date: 3/14/2017   Hospital Day: 4       Subjective/Interval History:   I have reviewed the flowsheet and previous days notes. Seen earlier today on rounds. Reviewed the evaluation and will assist in implementing plan as outlined by Dr. Bety Holm and team    3/12 BP low this AM. Still on vent. Off bicarb  3/13 gfap closed. Awke, passed SAT, SBT. Extubated  3/14 wants to go home. No fever. Does not get UTI      IMPRESSION:   · Acute respiratory failure- extubated  · Hypotension better  · Diabetes type I (HCC) insulin pump for awhile; diagnosed age 7  · Stress gastritis- mild coffee ground emesis  · Hypernatremia  · Acute renal failure-likely secondary to pre-renal due to dehydration and DKA  · Acute encephalopathy- toxic, drug induced or toxic? · Diabetic Ketoacidosis in the setting of [id number]  · UDS positive for cocaine  · Bipolar disorder continue lamictal when able to take PO intake-likely need psych input when able; certainly is not helped by the cocaine    Code Status: Full  Surrogate Decision Maker: Dione Perez per facebook  at [phone]   DVT Prophylaxis: heparin   GI Prophylaxis: not indicated   Baseline: independent      RECOMMENDATIONS/PLAN:   · Transfer to medical floor  · D/c lines- she bounced back in less than two weeks- need to assess risk for return. UDS and cocaine is an issue as well as compliance  · Stop abx  · D/c munoz   · diabetic education  · DVT, SUP prophylaxis  · Will be available to assist in medical management while in the CCU pending disposition     Code: Full Code      PCP:                                    Mike Monroe MD       Subjective/Initial History:   I have reviewed the flowsheet and previous days notes.   The patient is unable to give any meaningful history or review of systems because the patient is: Intubated on vent     Asked to see this 23 y.o. WF DM type 1 since age 7, current smoker on vent for Dr. Eh López in regards to acute respiratory failure, DKA and acidosis now in the CCU intubated, sedated on vent. Pt also has bipolar disorder and presentedto ED via EMS for unresponsiveness. Pt is intubated and sedated so hx was obtained thru chart review and friend at bedside. Per hx, pt was found to be \"normal\" last night at 11PM.  Friend found her unresponsive this AM with mottled skin so EMS called. On arrival, GCS~ 7, pt noted to have shallow respiration, not responding to 1mg narcan. At the time, BG ~ 600. Patient was subsequently intubated and started on insulin gtt. Vitals: T 93.2, P 142, /67 Labs: ,  Na 144, K 3.3, Cr 1.8, lactic acid 4.3    Pt was in ED [facility] last month for DKA after leaving [facility], having been admitted there for DKA. Her chart suggests multiple psychiatric issues, and non-compliance with medications. She presents here with severe DKA, ARF, SIRS, acute encephalopathy. Pt unresponsive for unclear duration, concerning for possible aspiration. -UA neg, BC obtained. Initial cxr neg for evidence of infiltration. PMH:  has a past medical history of ARF (acute renal failure) ([facility] (2/21/2017); Bipolar disorder ([facility] Contraception management; Depression; Diabetes in pregnancy; Diabetes type I ([facility] OTHER MEDICAL; OTHER MEDICAL; and Unspecified epilepsy without mention of intractable epilepsy ([facility] She also has no past medical history of Abnormal Pap smear; Acquired hypothyroidism; Anemia NEC; Complication of anesthesia; Heart abnormalities; Infertility; Postpartum depression; Rhesus isoimmunization unspecified as to episode of care in pregnancy; Sickle-cell disease, unspecified; Trauma; Unspecified breast disorder; or Unspecified diseases of blood and blood-forming organs.     PSH:   has a past surgical history that includes  delivery only (, 13). FHX: family history includes Cancer in her paternal grandfather; High Cholesterol in her maternal grandfather and maternal grandmother; Hypertension in her maternal grandfather and maternal grandmother; No Known Problems in her father and mother; Other in her sister. SHX:  reports that she has been smoking Cigarettes. She has a 3.50 pack-year smoking history. She has never used smokeless tobacco. She reports that she does not drink alcohol or use illicit drugs. ROS:Review of systems not obtained due to patient factors.     MAR reviewed and pertinent medications noted or modified as needed    Allergies   Allergen Reactions    Other Medication Rash     Tegaderm    Sulfa (Sulfonamide Antibiotics) Hives        MEDS:   Current Facility-Administered Medications   Medication    mupirocin (BACTROBAN) 2 % ointment    glucose chewable tablet 16 g    dextrose (D50W) injection syrg 12.5-25 g    glucagon (GLUCAGEN) injection 1 mg    insulin lispro (HUMALOG) injection 3 Units    insulin lispro (HUMALOG) injection    insulin NPH (NOVOLIN N, HUMULIN N) injection 15 Units    lamoTRIgine (LaMICtal) tablet 200 mg    PHENYLephrine (NEOSYNEPHRINE) 30,000 mcg in 0.9% sodium chloride 250 mL infusion    sodium chloride (NS) flush 5-10 mL    sodium chloride (NS) flush 5-10 mL    dextrose (D50W) injection syrg 12.5-25 g    acetaminophen (TYLENOL) tablet 650 mg    ondansetron (ZOFRAN) injection 4 mg    bisacodyl (DULCOLAX) tablet 5 mg    dextrose 5 % - 0.45% NaCl infusion    pantoprazole (PROTONIX) 40 mg in sodium chloride 0.9 % 10 mL injection    enoxaparin (LOVENOX) injection 30 mg    metoprolol (LOPRESSOR) injection 2.5 mg          Telemetry:    sinus tach      Objective:     Vital Signs: Intake/Output: Intake/Output:   Visit Vitals    /66    Pulse (!) 102    Temp 98.1 °F (36.7 °C)    Resp 12    Wt 58 kg (127 lb 13.9 oz)    SpO2 99%    Breastfeeding No    BMI 23.39 kg/m2      O2 Device: Room air  O2 Flow Rate (L/min): 3 l/min  Temp (24hrs), Av.1 °F (36.7 °C), Min:97.7 °F (36.5 °C), Max:98.4 °F (36.9 °C)     Intake/Output Summary (Last 24 hours) at 17 0908  Last data filed at 17 1942   Gross per 24 hour   Intake          2193.81 ml   Output             1580 ml   Net           613.81 ml    Last shift:         Last 3 shifts:  1901 -  0700  In: 5401.2 [P.O.:1540; I.V.:3676.2]  Out: 2423 [Urine:2255]         Hemodynamics:   PAP:   CO:     Wedge:   CI:     CVP:  CVP (mmHg): 13 mmHg (17 0000) SVR:       PVR:         Ventilator Settings:      Mode Rate TV Press PEEP FiO2 PIP Min. Vent   CPAP    400 ml 5 cm H2O  5 cm H20 40 %  11 cm H2O  4.52 l/min        Physical Exam:    General: young lean WF , awake   HEENT:  Lip ring   Neck, Lymph: No abnormally enlarged lymph nodes.    Chest: normal   Lungs: clear   Heart: Regular rate and rhythm, tachycardic   Abdomen: soft, non-tender, without masses or organomegaly   : munoz   Extremity: negative, cyanosis, clubbing;     Neuro: awake   Psych: calm   Skin: PallorSkin color, texture, turgor normal. No rashes or lesions;   Pulses: Bilateral, Radial, 2+ Normal upper and lower extremity pulses   Capillary refill: abnormal:  warm, painted toe nails;      Data:             Labs:    Recent Labs      17   0350  17   1137   WBC  10.9  37.9*   HGB  10.8*  13.1   PLT  157  354     Recent Labs      17   0426  17   1657  17   0350   17   0422   17   1558  17   1245  17   1224   NA  146*  145  147*   < >  148*   < >  145   --   142   K  3.3*  3.6  3.5   < >  3.4*   < >  3.2*   --   3.8   CL  114*  113*  115*   < >  111*   < >  109*   --   101   CO2  23  25  23   < >  27   < >  8*   --   7*   GLU  206*  258*  182*   < >  164*   < >  635*   --   964*   BUN  15  16  14   < >  24*   < >  29*   --   30*   CREA  1.04*  1.07*  1.37*   < >  1.76*   < >  1.84*   --   1.80*   CA  8.0*  7.7*  7.3*   < >  7.2*   < >  6.8*   --   7.1*   MG  1.8  1.8  1.9   < >  1.9   < >  1.7   --    --    PHOS   --    --    --    --    --    --   2.5*   --    --    LAC   --    --    --    --   1.8   --   3.5*  4.3*   --    ALB   --    --    --    --    --    --    --    --   2.3*   SGOT   --    --    --    --    --    --    --    --   33   ALT   --    --    --    --    --    --    --    --   21    < > = values in this interval not displayed. Recent Labs      03/12/17   0646  03/11/17 2008 03/11/17   1340   PH  7.37  7.43  6.92*   PCO2  49*  39  17*   PO2  160*  180*  188*   HCO3  27*  25  3*   FIO2  40  40  40     Recent Labs      03/12/17   0646  03/11/17 2008 03/11/17   1340   PH  7.37  7.43  6.92*   PCO2  49*  39  17*   PO2  160*  180*  188*   HCO3  27*  25  3*   FIO2  40  40  40     Recent Labs      03/14/17   0426  03/13/17   1657  03/13/17   0350   TROIQ  <0.04  <0.04  <0.04       Imaging:  I have personally reviewed the patients radiographs and have reviewed the reports:          Results from Hospital Encounter encounter on 03/11/17   XR CHEST PORT   Narrative EXAM:  XR CHEST PORT    INDICATION:  Tube/line placement    COMPARISON:  Chest x-ray 3/12/2017. FINDINGS: A portable AP radiograph of the chest was obtained at 0:45 hours. The  patient is on a cardiac monitor. The endotracheal tube projects over the  tracheal lucency with the tip approximately 1 cm above the kiko. Enteric tube  traverses expected course to below the diaphragm into the left upper quadrant. Central venous catheter projects in stable and expected course for right  internal jugular vein approach with the tip projecting over the right atrium. The lungs are low volume with focal vascular crowding and bibasilar atelectasis  but otherwise appear clear.   The cardiac and mediastinal contours and pulmonary  vascularity are normal.  The chest wall structures and visualized upper abdomen  appear stable with no acute interval change. Impression IMPRESSION: Tubes and lines as above with endotracheal tube tip about 1 cm above  the kiko. Results from [place] encounter on 03/11/17   CT HEAD WO CONT   Narrative EXAM:  CT HEAD WO CONT    INDICATION:   Arrival via EMS from home. Found at home unresponsive with shallow  respiration and high blood glucose reading level. Recently admitted 2/21/2017  for diabetic ketoacidosis. COMPARISON: CT September 24, 2008    TECHNIQUE: Unenhanced CT of the head was performed using 5 mm images. Brain and  bone windows were generated. CT dose reduction was achieved through use of a  standardized protocol tailored for this examination and automatic exposure  control for dose modulation. FINDINGS:  The ventricles and sulci are normal in size, shape and configuration and  midline. There is no significant white matter disease. There is no intracranial  hemorrhage, extra-axial collection, mass, mass effect or midline shift. The  basilar cisterns are open. No acute infarct is identified. The bone windows  demonstrate no abnormalities. The visualized portions of the paranasal sinuses  and mastoid air cells are clear.          Impression IMPRESSION: No acute findings               My assessment/plan was discussed with:  nursing    respiratory therapy Dr. rodríguez      Complex decision making was performed which includes reviewing the patient's past medical records, current laboratory results, medications, ECG and actual Xray films, immediately available at the bedside to the patient      Farhan Yang MD

## 2017-03-14 NOTE — PROGRESS NOTES
Hospitalist Progress Note    NAME: Caitlin Jiang   :  1990   MRN:  [id number]   Hospital day:  3      Hospitalist: Gerardo Johnston MD       Assessment / Plan:  Acute respiratory failure POA now extubated on room air  Acute encephalopathy POA  Intubated in ED on arrival due to inability to protect airway, unresponsiveness  Likely in setting of significant metabolic abnormalities, including acidosis pH 6.9 initially  Transferred to floor  Plan D/C in Am if remains stable    SIRS POA due to DKA, resolved, no clear infection  Hypotension  Cocaine use  Hypothermic, tachycardic, WBC 37.9 initially trending down now to 10.9  Unclear source, UA negative, cxr clear  BC negative  No HA and MS back to normal  Troponin okay, and echo okay     DKA in type 1 DM  Lactic acidosis  Anion gap closed, off insulin gtt  Switched to NPH insulin    YVROSE  Cr 1.3 today, likely prerenal, overall improving  S/P IVF    Bipolar disorder  lamictal  Seems calm      Code status: Full  Prophylaxis: Lovenox  Recommended Disposition: Home w/Family     Subjective:     Chief Complaint: unresponsive  Alert, no complaints, talking and oriented  No HA or CP or abdominal pain  Admits to me to being no compliant with her insulin  Transferred out to floor    Review of Systems:  Symptom Y/N Comments  Symptom Y/N Comments   Fever/Chills n   Chest Pain n    Poor Appetite    Edema     Cough n   Abdominal Pain n    Sputum    Joint Pain     SOB/PARMAR n   Pruritis/Rash     Nausea/vomit n   Tolerating PT/OT     Diarrhea n   Tolerating Diet yes    Constipation    Other       Could NOT obtain due to:      Objective:     VITALS:   Last 24hrs VS reviewed since prior progress note.  Most recent are:  Patient Vitals for the past 24 hrs:   Temp Pulse Resp BP SpO2   17 1530 98.3 °F (36.8 °C) 90 18 102/63 100 %   17 1245 97.4 °F (36.3 °C) (!) 102 18 94/66 95 %   17 1216 - - - 107/61 -   17 1212 98.4 °F (36.9 °C) - - - -   17 1000 - (!) 109 18 - 95 %   03/14/17 0900 - (!) 102 12 106/66 -   03/14/17 0800 - 97 8 100/63 -   03/14/17 0730 98.1 °F (36.7 °C) - - 102/65 -   03/14/17 0700 - 96 19 92/67 99 %   03/14/17 0600 - 96 21 99/66 -   03/14/17 0500 - 93 20 103/64 -   03/14/17 0400 98.3 °F (36.8 °C) 99 14 94/57 96 %   03/14/17 0300 - 97 15 108/83 96 %   03/14/17 0205 - 95 - 104/61 (!) 89 %   03/14/17 0100 - (!) 101 19 105/68 (!) 78 %   03/14/17 0000 98 °F (36.7 °C) (!) 107 18 103/71 95 %   03/13/17 2300 - (!) 113 20 108/76 100 %   03/13/17 2200 - (!) 118 16 99/73 97 %   03/13/17 2100 - (!) 109 12 112/56 99 %   03/13/17 2000 98.2 °F (36.8 °C) (!) 107 19 99/87 96 %   03/13/17 1900 - (!) 105 15 110/68 100 %   03/13/17 1800 - 89 20 100/61 100 %       Intake/Output Summary (Last 24 hours) at 03/14/17 1703  Last data filed at 03/14/17 1211   Gross per 24 hour   Intake             1700 ml   Output             1680 ml   Net               20 ml        PHYSICAL EXAM:  General: Alert oriented,no distress  EENT:  EOMI. Anicteric sclerae. Mucous membranes moist  Resp:  CTA bilaterally, no wheezing or rales. No accessory muscle use  CV:  Regular rhythm, no edema  GI:  Soft, non distended, non tender. +Bowel sounds  Neurologic:  Alert, oriented x 3 speech normal, face symmetric following commands  Psych:   Good insight, not anxious nor agitated  Skin:  No rashes, no jaundice  ________________________________________________________________________  Care Plan discussed with:    Comments   Patient x    Family      RN     Care Manager     Consultant                        Multidiciplinary team rounds were held today with , nursing, pharmacist and clinical coordinator. Patient's plan of care was discussed; medications were reviewed and discharge planning was addressed.      ________________________________________________________________________  Total NON critical care TIME:  25 Minutes  ________________________________________________________________________  Niranjan Moctezuma MD     Procedures: see electronic medical records for all procedures/Xrays and details which were not copied into this note but were reviewed prior to creation of Plan. LABS:  I reviewed today's most current labs and imaging studies.   Pertinent labs include:  Recent Labs      03/13/17   0350   WBC  10.9   HGB  10.8*   HCT  31.3*   PLT  157     Recent Labs      03/14/17   0426  03/13/17   1657  03/13/17   0350   NA  146*  145  147*   K  3.3*  3.6  3.5   CL  114*  113*  115*   CO2  23  25  23   GLU  206*  258*  182*   BUN  15  16  14   CREA  1.04*  1.07*  1.37*   CA  8.0*  7.7*  7.3*   MG  1.8  1.8  1.9       Signed: Niranjan Moctezuma MD

## 2017-03-14 NOTE — PROGRESS NOTES
[facility]    2000-pt assessed, boyfriend at bedside, pt ambulated to bathroom and voided, up to chair.      2200- washed up at sink, back to bed    0000- pt assessed still awake stated she's not tired    0400- pt didn't sleep at all, remained awake, c/o of hunger, suger free ananth given    0715- report given to Tc Horvath

## 2017-03-14 NOTE — PROGRESS NOTES
Primary Nurse Tomy Ramirez RN and NANCY Paredes performed a dual skin assessment on this patient No impairment noted, except small abrasions to bilateral legs and left hand.    Jimmy score is 20

## 2017-03-14 NOTE — PROGRESS NOTES
Attended Interdisciplinary rounds in Critical Care Unit, where patient care was discussed. Visit by: Elda Brunson. Mariel Yeh.  Renan Moore MA, [address]

## 2017-03-14 NOTE — PROGRESS NOTES
1825 Patient refused oral potassium pills at this time. Stated her throat felt sore and that she would prefer to take later. Offered to call physician to have changed to powder formulation; however, patient declines. States that she would like to try the pills later. Will inform oncoming nurse.

## 2017-03-14 NOTE — PROGRESS NOTES
Oncology Nursing Communication Tool      6:39 PM  3/14/2017     Bedside shift change report given to Ninoska Posey RN (incoming nurse) by María Amaro RN (outgoing nurse) on Arland Blizzard a 23 y.o. female who was admitted on 3/11/2017 11:23 AM. Report included the following information SBAR, Kardex, Procedure Summary, Intake/Output, MAR, Accordion, Recent Results and Med Rec Status. Significant changes during shift: Low blood sugar ac lunch. Potential discharge tomorrow      Issues for physician to address: None            Code Status: Full Code     Infections: No current active infections     Allergies:  Other medication and Sulfa (sulfonamide antibiotics)     Current diet: DIET DIABETIC CONSISTENT CARB Regular; 2 GM NA (House Low NA)       Pain Meds [] yes [x] no   Bowel Movement [] yes [x] no   Last Bowel Movement (date) 03/13            Vital Signs:   Patient Vitals for the past 12 hrs:   Temp Pulse Resp BP SpO2   03/14/17 1245 97.4 °F (36.3 °C) (!) 102 18 94/66 95 %   03/14/17 1216 - - - 107/61 -   03/14/17 1212 98.4 °F (36.9 °C) - - - -   03/14/17 1000 - (!) 109 18 - 95 %   03/14/17 0900 - (!) 102 12 106/66 -   03/14/17 0800 - 97 8 100/63 -   03/14/17 0730 98.1 °F (36.7 °C) - - 102/65 -   03/14/17 0700 - 96 19 92/67 99 %   03/14/17 0600 - 96 21 99/66 -   03/14/17 0500 - 93 20 103/64 -   03/14/17 0400 98.3 °F (36.8 °C) 99 14 94/57 96 %      Intake & Output:     Intake/Output Summary (Last 24 hours) at 03/14/17 1532  Last data filed at 03/14/17 1211   Gross per 24 hour   Intake             1800 ml   Output             1680 ml   Net              120 ml      Laboratory Results:     Recent Results (from the past 12 hour(s))   METABOLIC PANEL, BASIC    Collection Time: 03/14/17  4:26 AM   Result Value Ref Range    Sodium 146 (H) 136 - 145 mmol/L    Potassium 3.3 (L) 3.5 - 5.1 mmol/L    Chloride 114 (H) 97 - 108 mmol/L    CO2 23 21 - 32 mmol/L    Anion gap 9 5 - 15 mmol/L    Glucose 206 (H) 65 - 100 mg/dL    BUN 15 6 - 20 MG/DL    Creatinine 1.04 (H) 0.55 - 1.02 MG/DL    BUN/Creatinine ratio 14 12 - 20      GFR est AA >60 >60 ml/min/1.73m2    GFR est non-AA >60 >60 ml/min/1.73m2    Calcium 8.0 (L) 8.5 - 10.1 MG/DL   MAGNESIUM    Collection Time: 03/14/17  4:26 AM   Result Value Ref Range    Magnesium 1.8 1.6 - 2.4 mg/dL   TROPONIN I    Collection Time: 03/14/17  4:26 AM   Result Value Ref Range    Troponin-I, Qt. <0.04 <0.05 ng/mL   GLUCOSE, POC    Collection Time: 03/14/17  7:49 AM   Result Value Ref Range    Glucose (POC) 165 (H) 65 - 100 mg/dL    Performed by Judith Wang    GLUCOSE, POC    Collection Time: 03/14/17 11:50 AM   Result Value Ref Range    Glucose (POC) 92 65 - 100 mg/dL    Performed by Hany Valverde    GLUCOSE, POC    Collection Time: 03/14/17 12:44 PM   Result Value Ref Range    Glucose (POC) 69 65 - 100 mg/dL    Performed by Jose Angel Fountain    GLUCOSE, POC    Collection Time: 03/14/17  1:05 PM   Result Value Ref Range    Glucose (POC) 69 65 - 100 mg/dL    Performed by Ajay Hunter, POC    Collection Time: 03/14/17  1:43 PM   Result Value Ref Range    Glucose (POC) 113 (H) 65 - 100 mg/dL    Performed by Jose Angel Fountain               Opportunity for questions and clarifications were given to the incoming nurse. Patient's bed is in low position, side rails x2, door open PRN, call bell within reach and patient not in distress.       Chitra Covarrubias, RN

## 2017-03-15 NOTE — PROGRESS NOTES
Discharge instructions, prescriptions, and follow up reviewed with pt, understanding verbalized. IV removes with cath tip intact, no s/sx of infection or infiltration. Patient awaiting her room mate to pick her up.

## 2017-03-15 NOTE — PROGRESS NOTES
Pt. For discharge today to friend's home. Her son, who is almost 3 yrs. Old, is with her parents. She is unemployed and uninsured. She  has  Type I DM, since age of 7. She came into the hospital  Via the ER, unresponsive with Sepsis, lactic acidosis, DKA, and acute respiratory failure. BG was 964. She was intubated and placed on insulin drip. She was positive for cocaine, also. Pt. states she goes to the [facility] in [facility], and gets her insulin. She states she does have some trouble affording the Lamictal, but thinks there may be a generic. I called the [facility] and there is a generic for $14.14. I called Dr. Martha Palacio and he will print the Rx. and sign. Pt. will  the med. when she is discharged home. --ANA Rowan--[phone]    Care Management Interventions  PCP Verified by CM: No (goes to [facility])  Mode of Transport at Discharge: Other (see comment) (family)  Transition of Care Consult (CM Consult): Discharge Planning  MyChart Signup: No  Discharge Durable Medical Equipment: No  Health Maintenance Reviewed: Yes  Physical Therapy Consult: No  Occupational Therapy Consult: No  Speech Therapy Consult: No  Current Support Network:  Other (lives with friend)  Confirm Follow Up Transport: Family  Plan discussed with Pt/Family/Caregiver: Yes  Freedom of Choice Offered: Yes  Discharge Location  Discharge Placement: Home with family assistance

## 2017-03-15 NOTE — PROGRESS NOTES
1940: Bedside and Verbal shift change report given to [facility] (oncoming nurse) by Jodi Weinstein (offgoing nurse). Report included the following information SBAR. Visitor at bedside. Encouraged patient to call if she has needs. 2241: pst and lav drawn.

## 2017-03-15 NOTE — DISCHARGE SUMMARY
Hospitalist Discharge Note    NAME: Stephane Hughes   :  1990   MRN:  [id number]   Hospital day:  4      Hospitalist: Rosario Braxton MD     Admit date: 3/11/2017    Discharge date: 03/15/17    PCP: PROVIDER UNKNOWN    Discharge Diagnoses:    DKA in type 1 DM POA admit , HCO3 7 with AG 34, pH 6.92    Acute respiratory failure POA required intubation in ED    Acute encephalopathy POA    SIRS POA due to DKA, resolved, no clear infection    Hypotension POA    Cocaine use POA    Lactic acidosis POA 4.3    Acute kidney injury POA admit BUN/creat 30/1.80    Bipolar disorder    Code status: Full      Discharge Medications:  Current Discharge Medication List      CONTINUE these medications which have CHANGED    Details   lamoTRIgine (LAMICTAL) 200 mg tablet Take 1 Tab by mouth two (2) times a day. Qty: 60 Tab, Refills: 3      insulin NPH (NOVOLIN N, HUMULIN N) 100 unit/mL injection 15 Units by SubCUTAneous route Before breakfast and dinner. Qty: 3 Vial, Refills: 2         CONTINUE these medications which have NOT CHANGED    Details   insulin lispro (HUMALOG) 100 unit/mL injection by SubCUTAneous route. 1 unit per 30 carbs      insulin syringe-needle U-100 0.3 mL 31 gauge x 15/64\" syrg 1 Each by SubCUTAneous route five (5) times daily.  Follow-up needed for refills  Qty: 150 Pen Needle, Refills: 0             Follow-up Information     Follow up With Details Comments Contact Info    [facility] Schedule an appointment as soon as possible for a visit in 1 week check to see how Diabetes is doing     Kassandra Colvin MD Schedule an appointment as soon as possible for a visit in 3 weeks  [address]  [phone]            Time spent on discharge:   I spent greater than 30 minutes on discharge, seeing and examining the patient, reconciling home meds and new meds, coordinating care with case management, doing the discharge papers and the D/C summary    Discharge disposition: home    Discharge Condition: Stable    Summary of admission H+P(copied from Dr Jaspreet Caruso Note):     CHIEF COMPLAINT: unresponsiveness     HISTORY OF PRESENT ILLNESS:   Leda Anna is a 23 y.o.  female With pmhx significant for A4BX-fz of noncompliance, bipolar disorder present to ED via EMS for unresponsiveness. Pt is intubated and sedated so hx was obtained thru chart review and friend at bedside. Per hx, pt was found to be \"normal\" last night at 11PM. Friend found her unresponsive this AM with mottled skin so EMS called. On arrival, GCS~ 7, pt noted to have shallow respiration, not responding to 1mg narcan. At the time, BG ~ 600. Patient was subsequently intubated and started on insulin gtt. Vitals: T 93.2, P 142, /67  Labs: , Na 144, K 3.3, Cr 1.8, lactic acid 4.3        We were asked to admit for work up and evaluation of the above problems.           Past Medical History:   Diagnosis Date    [facility]F [facility]acute [facility] failure) [facility] 2/21/2017    Bipolar disorder ([facility]      Contraception management       Gyn: Dr. Adam Quiles; Nexplanon placed 8/13 and removed 7/6/15 because she wanted to conceive    Depression      Diabetes in pregnancy      Diabetes type I ([address]       Age 7; on insulin pump    HX OTHER MEDICAL       late pnc at 22 weeks    HX OTHER MEDICAL       polyhydramnios; previous pregnancy    Unspecified epilepsy without mention of intractable epilepsy ([facility]       diabetic related      CHIEF COMPLAINT: unresponsiveness     HISTORY OF PRESENT ILLNESS:   Leda Anna is a 23 y.o.  female With pmhx significant for P0PF-hj of noncompliance, bipolar disorder present to ED via EMS for unresponsiveness. Pt is intubated and sedated so hx was obtained thru chart review and friend at bedside. Per hx, pt was found to be \"normal\" last night at 11PM. Friend found her unresponsive this AM with mottled skin so EMS called.  On arrival, GCS~ 7, pt noted to have shallow respiration, not responding to 1mg narcan. At the time, BG ~ 600. Patient was subsequently intubated and started on insulin gtt. Vitals: T 93.2, P 142, /67  Labs: , Na 144, K 3.3, Cr 1.8, lactic acid 4.3        We were asked to admit for work up and evaluation of the above problems.           Past Medical History:   Diagnosis Date    [facility]acute [facility] failure) ([facility] 2/21/2017    [facility]      Contraception management       Gyn: Dr. Ava Dukes;  Nexplanon placed 8/13 and removed 7/6/15 because she wanted to conceive    Depression      Diabetes in pregnancy      Diabetes type I ([facility]       Age 7; on insulin pump    HX OTHER MEDICAL       late pnc at 22 weeks    HX OTHER MEDICAL       polyhydramnios; previous pregnancy    Unspecified epilepsy without mention of intractable epilepsy ([facility]       diabetic related          Hospital course:     Acute respiratory failure POA now extubated on room air  Acute encephalopathy POA  Intubated in ED on arrival due to inability to protect airway, unresponsiveness  Likely in setting of significant metabolic abnormalities, including acidosis pH 6.9 initially  Extubated and transferred to floor  Weaned off O2  CXR without ASD    SIRS POA due to DKA, resolved, no clear infection  Hypotension POA  Cocaine use POA  Hypothermic, tachycardic, WBC 37.9 at admit,  trended down now to 10.9  Unclear source, UA negative, CXR clear  BC negative  No HA and MS back to normal, no meningismus  Antibiotics stopped  Troponin okay, and echo LVEF 70%, no wall motion changes, normal RV function    DKA in type 1 DM POA admit , HCO3 7 with AG 34, pH 6.92  Lactic acidosis POA 4.3  Anion gap closed with insulin gtt  Switched to NPH insulin BID  BS 69, 113, 213, 230, 137, 200    Acute kidney injury POA admit BUN/creat 30/1.80  Hypovolemia due to osmotic diuresis  S/P IVF  Creatinine 1.06 at discharge    Bipolar disorder  lamictal  Seems calm      Code status: Full  Prophylaxis: Lovenox  Recommended Disposition: Home w/Family     Subjective:     Chief Complaint: unresponsive  Alert, no complaints, ready to go home  No HA or CP or abdominal pain  Admits to being no compliant with her insulin, made\" bad choices just before she came in\"  Transferred out to floor    Review of Systems:  Symptom Y/N Comments  Symptom Y/N Comments   Fever/Chills n   Chest Pain n    Poor Appetite    Edema     Cough n   Abdominal Pain n    Sputum    Joint Pain     SOB/PARMAR n   Pruritis/Rash     Nausea/vomit n   Tolerating PT/OT     Diarrhea n   Tolerating Diet yes    Constipation    Other       Could NOT obtain due to:      Objective:     VITALS:   Last 24hrs VS reviewed since prior progress note. Most recent are:  Patient Vitals for the past 24 hrs:   Temp Pulse Resp BP SpO2   03/15/17 0504 98.8 °F (37.1 °C) 85 15 112/79 98 %   03/14/17 2142 98.1 °F (36.7 °C) 90 18 117/76 98 %   03/14/17 1530 98.3 °F (36.8 °C) 90 18 102/63 100 %   03/14/17 1245 97.4 °F (36.3 °C) (!) 102 18 94/66 95 %   03/14/17 1216 - - - 107/61 -   03/14/17 1212 98.4 °F (36.9 °C) - - - -       Intake/Output Summary (Last 24 hours) at 03/15/17 1153  Last data filed at 03/15/17 0946   Gross per 24 hour   Intake              320 ml   Output                0 ml   Net              320 ml        PHYSICAL EXAM:  General: Alert oriented,no distress  Resp:  CTA bilaterally, no wheezing or rales. No accessory muscle use  CV:  Regular rhythm, no edema  GI:  Soft, non distended, non tender. +Bowel sounds  Neurologic:  Alert, oriented x 3 speech normal  Psych:   Good insight, not anxious nor agitated  Skin:  No rashes, no jaundice  ________________________________________________________________________  Care Plan discussed with:    Comments   Patient x    Family      RN     Care Manager     Consultant                        Multidiciplinary team rounds were held today with , nursing, pharmacist and clinical coordinator. Patient's plan of care was discussed; medications were reviewed and discharge planning was addressed. ________________________________________________________________________  ________________________________________________________________________  Jay Michele MD     Procedures: see electronic medical records for all procedures/Xrays and details which were not copied into this note but were reviewed prior to creation of Plan. LABS:  I reviewed today's most current labs and imaging studies.   Pertinent labs include:  Recent Labs      03/15/17   0514  03/13/17   0350   WBC  4.4  10.9   HGB  10.1*  10.8*   HCT  29.2*  31.3*   PLT  132*  157     Recent Labs      03/15/17   0514  03/14/17   0426  03/13/17   1657   NA  146*  146*  145   K  3.2*  3.3*  3.6   CL  111*  114*  113*   CO2  24  23  25   GLU  163*  206*  258*   BUN  13  15  16   CREA  1.06*  1.04*  1.07*   CA  8.6  8.0*  7.7*   MG  2.0  1.8  1.8       Signed: Jay Michele MD

## 2017-03-15 NOTE — PROGRESS NOTES
Bedside report received from  Geovanna [facility] (offgoing nurse). Assumed care of patient. No immediate concerns, patient resting with call bell within reach.

## 2017-03-15 NOTE — DISCHARGE INSTRUCTIONS
Patient Discharge Instructions    Mini Sweet / [id number] : 1990    Admitted 3/11/2017 Discharged: 3/15/2017         DISCHARGE DIAGNOSIS:       DKA, type 1 ([address] (3/11/2017)           What to do at Home    1. Recommended diet: Diabetic Diet    2. Recommended activity: Activity as tolerated    3. If you experience any of the following symptoms then please call your primary care physician or return to the emergency room if you cannot get hold of your doctor:   Fevers > 100.5, chills   Nausea or vomiting, persistent diarrhea > 24 hours   Blood in stool or black stools   Chest pain or SOB      Follow-up Information     Follow up With Details Comments Contact Info    [facility] Schedule an appointment as soon as possible for a visit in 1 week check to see how Diabetes is doing     Marcelino Jain MD Schedule an appointment as soon as possible for a visit in 3 weeks  [address]  [phone]            Please take insulin as directed, do not stop or miss doses    Information obtained by :  I understand that if any problems occur once I am at home I am to contact my physician. I understand and acknowledge receipt of the instructions indicated above.                                                                                                                                            Physician's or R.N.'s Signature                                                                  Date/Time                                                                                                                                              Patient or Representative Signature                                                          Date/Time

## 2017-04-12 PROBLEM — N17.9 ACUTE RENAL FAILURE (ARF) (HCC): Status: ACTIVE | Noted: 2017-01-01

## 2017-04-12 NOTE — ED NOTES
PICC team placing PICC line. Unable to check blood sugar at this time. Will reassess when they are done.

## 2017-04-12 NOTE — PROGRESS NOTES
Patient arrived to CCU room 2522. Lashonda Mehta RN at the bed side to place patient on the monitor. TRANSFER - IN REPORT:  Verbal report received from Albino Moscoso RN(name) on Naldo Barr  being received from ED(unit) for routine progression of care    Report consisted of patients Situation, Background, Assessment and   Recommendations(SBAR). Information from the following report(s) SBAR, Kardex, ED Summary, Intake/Output, MAR, Recent Results, Cardiac Rhythm NST in the 120's and Alarm Parameters  was reviewed with the receiving nurse. Opportunity for questions and clarification was provided. Insulin infusing at 4.7 units/hr with NS KVO, Bicarb infusing at 125. GlucoStabliizer settings changed with Lana Gifford RN to reflect order. Target glucose to 150-250 with a multiplier of 0.2,     Blood sugar last checked 153, potassium 2.8    1858 Dr. Christy Prom informed of blood sugars and potassium. Orders to be placed by MD      1930 Bedside and Verbal shift change report given to Rox CRUZ (oncoming nurse) by Andreina Butler RN (offgoing nurse). Report included the following information SBAR, Kardex, Intake/Output, MAR, Recent Results and Med Rec Status. Intravenous fluids: Bicarb at 125ml/hr, Insulin at 0.8 with NS KVO  Restraint: none  Sampson: yes urinary retention.

## 2017-04-12 NOTE — IP AVS SNAPSHOT
Current Discharge Medication List  
  
START taking these medications Dose & Instructions Dispensing Information Comments Morning Noon Evening Bedtime  
 omeprazole 20 mg capsule Commonly known as:  PriLOSEC Your last dose was: Your next dose is:    
   
   
 Dose:  20 mg Take 1 Cap by mouth daily for 30 days. Quantity:  30 Cap Refills:  1 CONTINUE these medications which have CHANGED Dose & Instructions Dispensing Information Comments Morning Noon Evening Bedtime  
 insulin  unit/mL injection Commonly known as:  Dondra Talty What changed:  how much to take Your last dose was: Your next dose is:    
   
   
 Dose:  18 Units 18 Units by SubCUTAneous route Before breakfast and dinner. Quantity:  3 Vial  
Refills:  2 CONTINUE these medications which have NOT CHANGED Dose & Instructions Dispensing Information Comments Morning Noon Evening Bedtime HumaLOG 100 unit/mL injection Generic drug:  insulin lispro Your last dose was: Your next dose is:    
   
   
 by SubCUTAneous route. 1 unit per 30 carbs Refills:  0  
     
   
   
   
  
 insulin syringe-needle U-100 0.3 mL 31 gauge x 15/64\" Syrg Your last dose was: Your next dose is:    
   
   
 Dose:  1 Each  
1 Each by SubCUTAneous route five (5) times daily. Follow-up needed for refills Quantity:  150 Pen Needle Refills:  0 ASK your doctor about these medications Dose & Instructions Dispensing Information Comments Morning Noon Evening Bedtime  
 lamoTRIgine 200 mg tablet Commonly known as: LaMICtal  
   
Your last dose was: Your next dose is:    
   
   
 Dose:  200 mg Take 1 Tab by mouth two (2) times a day. Quantity:  60 Tab Refills:  1 Where to Get Your Medications Information on where to get these meds will be given to you by the nurse or doctor. ! Ask your nurse or doctor about these medications  
  insulin  unit/mL injection  
 omeprazole 20 mg capsule

## 2017-04-12 NOTE — IP AVS SNAPSHOT
Summary of Care Report The Summary of Care report has been created to help improve care coordination. Users with access to [employer] or [facility] (Web-based application) may access additional patient information including the Discharge Summary. If you are not currently a [address] user and need more information, please call the number listed below in the [place] [address] section and ask to be connected with Medical Records. Facility Information Name Address Phone [address] [address] [phone] Patient Information Patient Name Sex SHANNON Galaviz ([id number]) Female 1990 Discharge Information Admitting Provider Service Area Unit Edy Galvez MD / [facility] 2 Critical Care 3 / [phone] Discharge Provider Discharge Date/Time Discharge Disposition Destination (none) 2017 (Pending) AHR (none) Patient Language Language ENGLISH [13] Hospital Problems as of 2017  Reviewed: 2017  6:38 PM by Lin Ward MD  
  
  
  
 Class Noted - Resolved Last Modified POA Active Problems DKA, type 1 ([facility] [address]  3/11/2017 - Present 2017 by Edy Galvez MD Unknown Entered by Angélica Rubio MD  
  Acute renal failure (ARF) ([facility] [address]  2017 - Present 2017 by Edy Galvez MD Unknown Entered by Edy Galvez MD  
  
Non-Hospital Problems as of 2017  Reviewed: 2017  6:38 PM by Lin Ward MD  
  
  
  
 Class Noted - Resolved Last Modified Active Problems DKA, type 1, not at goal [facility])  Unknown - Present 2017 by Lin Ward MD  
  Entered by Wallace Matos LPN   Overview Addendum 2015  8:17 PM by Zeenat Osullivan MD  
   Type 1 DM diagnosed at age 7, on insulin pump since age 9 
  
  
 Bipolar disorder [facility])  Unknown - Present 2/21/2017 by Maite Mckeon MD  
  Entered by Mimi Causey LPN Partial epilepsy with impairment of consciousness ([facility]  2/12/2015 - Present 2/21/2017 by Maite Mckeon MD  
  Entered by Lul Zabala MD  
  Seizure disorder [facility])  12/25/2015 - Present 2/21/2017 by Maite Mckeon MD  
  Entered by Stephenie Matthew MD  
  Anxiety disorder  12/25/2015 - Present 2/21/2017 by Maite Mckeon MD  
  Entered by Stephenie Matthew MD  
  OCD (obsessive compulsive disorder)  12/25/2015 - Present 2/21/2017 by Maite Mckeon MD  
  Entered by Stephenie Matthew MD  
  Mixed hyperlipidemia  8/3/2016 - Present 2/21/2017 by Maite Mckeon MD  
  Entered by Stephenie Matthew MD  
  Insulin pump in place  8/3/2016 - Present 2/21/2017 by Maite Mckeon MD  
  Entered by Stephenie Matthew MD  
  ARF (acute renal failure) ([facility]  2/21/2017 - Present 2/21/2017 by Maite Mckeon MD  
  Entered by Maite Mckeon MD  
  Dehydration  2/21/2017 - Present 2/21/2017 by Maite Mckeon MD  
  Entered by Maite Mckeon MD  
  Acidosis, metabolic  8/56/6709 - Present 2/21/2017 by Maite Mckeon MD  
  Entered by Maite Mckeon MD  
  Leukocytosis  2/21/2017 - Present 2/21/2017 by Maite Mckeon MD  
  Entered by Maite Mckeon MD  
  Acute encephalopathy  2/21/2017 - Present 2/21/2017 by Maite Mckeon MD  
  Entered by Maite Mckeon MD  
  Lactic acidemia  2/21/2017 - Present 2/21/2017 by Maite Mckeon MD  
  Entered by Maite Mckeon MD  
  Sinus tachycardia  2/21/2017 - Present 2/21/2017 by Maite Mckeon MD  
  Entered by Maite Mckeon MD  
  SIRS (systemic inflammatory response syndrome) ([facility]  2/21/2017 - Present 2/21/2017 by Maite Mckeon MD  
  Entered by Maite Mckeon MD  
  
You are allergic to the following Allergen Reactions Other Medication Rash Tegaderm Sulfa (Sulfonamide Antibiotics) Hives Current Discharge Medication List  
  
START taking these medications Dose & Instructions Dispensing Information Comments  
 omeprazole 20 mg capsule Commonly known as:  PriLOSEC Dose:  20 mg Take 1 Cap by mouth daily for 30 days. Quantity:  30 Cap Refills:  1 CONTINUE these medications which have CHANGED Dose & Instructions Dispensing Information Comments  
 insulin  unit/mL injection Commonly known as:  Sepulveda Free What changed:  how much to take Dose:  18 Units 18 Units by SubCUTAneous route Before breakfast and dinner. Quantity:  3 Vial  
Refills:  2 CONTINUE these medications which have NOT CHANGED Dose & Instructions Dispensing Information Comments HumaLOG 100 unit/mL injection Generic drug:  insulin lispro  
 by SubCUTAneous route. 1 unit per 30 carbs Refills:  0  
   
 insulin syringe-needle U-100 0.3 mL 31 gauge x 15/64\" Syrg Dose:  1 Each  
1 Each by SubCUTAneous route five (5) times daily. Follow-up needed for refills Quantity:  150 Pen Needle Refills:  0 ASK your doctor about these medications Dose & Instructions Dispensing Information Comments  
 lamoTRIgine 200 mg tablet Commonly known as: LaMICtal  
 Dose:  200 mg Take 1 Tab by mouth two (2) times a day. Quantity:  60 Tab Refills:  1 Current Immunizations Name Date Influenza Vaccine 1/1/2017, 12/29/2012 Pneumococcal Vaccine (Unspecified Type) 1/1/2008 Tdap 8/2/2016 Follow-up Information Follow up With Details Comments Contact Info [id number]  Can give you meds, Insulin and lamictol. [address] [place] [address] [phone] Discharge Instructions Diabetes Sick-Day Plan: Care Instructions Your Care Instructions If you have diabetes, many other illnesses can make your blood sugar go up. This can be dangerous. When you are sick with the flu or another illness, your body releases hormones to fight infection. These hormones raise blood sugar levels and make it hard for insulin or other medicines to lower your blood sugar. Work with your doctor to make a plan for what to do on days when you are sick. Follow-up care is a key part of your treatment and safety. Be sure to make and go to all appointments, and call your doctor if you are having problems. It's also a good idea to know your test results and keep a list of the medicines you take. How can you care for yourself at home? · Work with your doctor to write up a sick-day plan for what to do on days when you are sick. Your blood sugar can go up or down, depending on your illness and whether you can keep food down. Call your doctor when you are sick, to see if you need to adjust your pills or insulin. · Write down the diabetes medicines you have been taking and whether you have changed the dose based on your sick-day plan. Have this information ready when you call your doctor. · Eat your normal types and amounts of food. Drink extra fluids, such as water, broth, and fruit juice, to prevent dehydration. ¨ If your blood sugar level is higher than the blood sugar level your doctor recommends (for example, above 240 milligrams per deciliter [mg/dL]), drink extra liquids that do not contain sugar, such as water or sugar-free cola. ¨ If you cannot eat your usual foods, drink extra liquids, such as soup, sports drinks, or milk. You may also eat food that is gentle on the stomach, such as crackers, gelatin dessert, or applesauce. Try to eat or drink 50 grams of carbohydrate every 3 to 4 hours. For example, 6 saltine crackers, 1 cup (8 ounces) of milk, and ½ cup (4 ounces) of orange juice each contain about 15 grams of carbohydrate. · Check your blood sugar at least every 3 to 4 hours.  If it goes up fast, check it more often. And check it even through the night. Take insulin if your doctor told you to do so. If you and your doctor did not have a sick-day plan for taking extra insulin, call him or her for advice. · If you take insulin, check your urine or blood for ketones. This is especially important if your blood sugar is high. · Do not take any over-the-counter medicines, such as pain relievers, decongestants, or herbal products or other natural medicines, without talking with your doctor first. 
· Do not drive. If you need to see your doctor or go anywhere else, ask a family member or friend to drive you. When should you call for help? Call 911 anytime you think you may need emergency care. For example, call if: 
· You passed out (lost consciousness), or you suddenly become very sleepy or confused. (You may have very low blood sugar.) · You have symptoms of high blood sugar, such as: ¨ Blurred vision. ¨ Trouble staying awake or being woken up. ¨ Fast, deep breathing. ¨ Breath that smells fruity. ¨ Belly pain, not feeling hungry, and vomiting. ¨ Feeling confused. Call your doctor now or seek immediate medical care if: 
· You are sick and cannot control your blood sugar. · You have been vomiting or have had diarrhea for more than 6 hours. · Your blood sugar stays higher than the level your doctor has set for you. · You have symptoms of low blood sugar, such as: ¨ Sweating. ¨ Feeling nervous, shaky, and weak. ¨ Extreme hunger and slight nausea. ¨ Dizziness and headache. ¨ Blurred vision. ¨ Confusion. Watch closely for changes in your health, and be sure to contact your doctor if: 
· You have a hard time knowing when your blood sugar is low. · You have trouble keeping your blood sugar in the target range. · You often have problems controlling your blood sugar. · You have symptoms of long-term diabetes problems, such as: ¨ New vision changes. ¨ New pain, numbness, or tingling in your hands or feet. ¨ Skin problems. Where can you learn more? Go to http://uriel-reymundo.info/. Enter Y827 in the search box to learn more about \"Diabetes Sick-Day Plan: Care Instructions. \" Current as of: May 23, 2016 Content Version: 11.2 © 3221-2072 [employer]. Care instructions adapted under license by [employer] (which disclaims liability or warranty for this information). If you have questions about a medical condition or this instruction, always ask your healthcare professional. [place] [address] any warranty or liability for your use of this information. DISCHARGE SUMMARY from Nurse The following personal items are in your possession at time of discharge: 
 
  
  
  
  
  
  
  
  
 
 
 
 
PATIENT INSTRUCTIONS: 
 
 
F-face looks uneven A-arms unable to move or move unevenly S-speech slurred or non-existent T-time-call 911 as soon as signs and symptoms begin-DO NOT go Back to bed or wait to see if you get better-TIME IS BRAIN. Warning Signs of HEART ATTACK Call 911 if you have these symptoms: 
? Chest discomfort. Most heart attacks involve discomfort in the center of the chest that lasts more than a few minutes, or that goes away and comes back. It can feel like uncomfortable pressure, squeezing, fullness, or pain. ? Discomfort in other areas of the upper body. Symptoms can include pain or discomfort in one or both arms, the back, neck, jaw, or stomach. ? Shortness of breath with or without chest discomfort. ? Other signs may include breaking out in a cold sweat, nausea, or lightheadedness. Don't wait more than five minutes to call [address]! Fast action can save your life.  Calling 911 is almost always the fastest way to get lifesaving treatment. Emergency Medical Services staff can begin treatment when they arrive  up to an hour sooner than if someone gets to the hospital by car. The discharge information has been reviewed with the patient and friend. The patient and friend verbalized understanding. Discharge medications reviewed with the patient and friend and appropriate educational materials and side effects teaching were provided. Chart Review Routing History Recipient Method Report Sent By Neptali Riojas NP Fax: [phone] Phone: [phone] Fax Provider Comm Report Helio Tirado MD [[id number]] 9/21/2011 10:33 PM 09/21/2011 Jared Clinton MD  
Fax: [phone] Phone: [phone] Fax Provider Comm Report Helio Tirado MD [[id number]] 9/21/2011 10:33 PM 09/21/2011 Dona Severs, MD  
Phone: [phone] In Basket Note Review Helio Tirado MD [[id number]] 4/17/2013  3:31 PM 04/17/2013 Tracie Haddad MD  
Phone: [phone] In Basket Note Review Helio Tirado MD [[id number]] 5/8/2013  2:25 PM 04/17/2013 Provider Unknown, MD  
Patient not available to ask [address] IP Auto Routed Notes MD Babita Ayon 12/1/2015  8:24 PM 12/01/2015 Provider Unknown, MD  
Patient not available to ask [address] IP Auto Routed Notes Chucho Dupree MD [[id number]] 12/4/2015 12:58 PM 12/04/2015 Lissa Yates MD  
Phone: [phone] In Basket IP Auto Routed Notes Breanna Irizarry MD [[id number]] 2/21/2017  7:01 PM 02/21/2017 Provider Unknown, MD  
Patient not available to ask [address] IP Auto Routed Notes Lefty Garsia MD [[id number]] 3/11/2017  3:25 PM 03/11/2017 Provider Unknown, MD  
Patient not available to ask [address] IP Auto Routed Notes Lefty Garsia MD [[id number]] 3/11/2017  3:34 PM 03/11/2017 Provider Unknown, MD  
Patient not available to ask [address] IP Auto Routed Notes France Guzman MD [[id number]] 3/11/2017  4:15 PM 03/11/2017 Provider Unknown, MD  
Patient not available to ask [address] IP Auto Routed Notes MD Yoel Reed 3/15/2017 10:01 PM 03/15/2017 Provider Unknown, MD  
Patient not available to ask [address] IP Auto Routed Notes María Garrison MD [[id number]] 4/12/2017  1:15 PM 04/12/2017 Provider Unknown, MD  
Patient not available to ask [address] IP Auto Routed Notes Tierney Hernandez MD [[id number]] 4/14/2017  2:18 PM 04/14/2017

## 2017-04-12 NOTE — ED NOTES
Pt with extreme blood glucose elevation, kussmaul respirations and unresponsive. Attempting to find iv access. Multiple attempts.  Only one access at this time

## 2017-04-12 NOTE — IP AVS SNAPSHOT
[address] [facility] 
[phone] Patient: Ann Joshi MRN: [id number] :1990 You are allergic to the following Allergen Reactions Other Medication Rash Tegaderm Sulfa (Sulfonamide Antibiotics) Hives Recent Documentation Height Weight BMI OB Status Smoking Status 1.676 m 56.6 kg 20.14 kg/m2 Having regular periods Current Every Day Smoker Unresulted Labs Order Current Status CULTURE, BLOOD, PAIRED Preliminary result Emergency Contacts Name Discharge Info Relation Home Work Mobile Maryanne Yan [1] [phone] About your hospitalization You were admitted on:  2017 You last received care in the:  [facility] 2 CRITICAL CARE 3 You were discharged on:  2017 Unit phone number:  [phone] Why you were hospitalized Your primary diagnosis was:  Not on File Your diagnoses also included:  Dka, Type 1 (Hcc), Acute Renal Failure (Arf) (Hcc) Providers Seen During Your Hospitalizations Provider Role Specialty Primary office phone Ho Abbasi MD Attending Provider Emergency Medicine [phone] Ector Barr MD Attending Provider Internal Medicine [phone] Your Primary Care Physician (PCP) Primary Care Physician Office Phone Office Fax UNKNOWN, PROVIDER ** None ** ** None ** Follow-up Information Follow up With Details Comments Contact Info [address]  Can give you meds, Insulin and lamictol. [address] [phone] Current Discharge Medication List  
  
START taking these medications Dose & Instructions Dispensing Information Comments Morning Noon Evening Bedtime  
 omeprazole 20 mg capsule Commonly known as:  PriLOSEC Your last dose was: Your next dose is: Dose:  20 mg Take 1 Cap by mouth daily for 30 days. Quantity:  30 Cap Refills:  1 CONTINUE these medications which have CHANGED Dose & Instructions Dispensing Information Comments Morning Noon Evening Bedtime  
 insulin  unit/mL injection Commonly known as:  Linnea Zacarias What changed:  how much to take Your last dose was: Your next dose is:    
   
   
 Dose:  18 Units 18 Units by SubCUTAneous route Before breakfast and dinner. Quantity:  3 Vial  
Refills:  2 CONTINUE these medications which have NOT CHANGED Dose & Instructions Dispensing Information Comments Morning Noon Evening Bedtime HumaLOG 100 unit/mL injection Generic drug:  insulin lispro Your last dose was: Your next dose is:    
   
   
 by SubCUTAneous route. 1 unit per 30 carbs Refills:  0  
     
   
   
   
  
 insulin syringe-needle U-100 0.3 mL 31 gauge x 15/64\" Syrg Your last dose was: Your next dose is:    
   
   
 Dose:  1 Each  
1 Each by SubCUTAneous route five (5) times daily. Follow-up needed for refills Quantity:  150 Pen Needle Refills:  0 ASK your doctor about these medications Dose & Instructions Dispensing Information Comments Morning Noon Evening Bedtime  
 lamoTRIgine 200 mg tablet Commonly known as: LaMICtal  
   
Your last dose was: Your next dose is:    
   
   
 Dose:  200 mg Take 1 Tab by mouth two (2) times a day. Quantity:  60 Tab Refills:  1 Where to Get Your Medications Information on where to get these meds will be given to you by the nurse or doctor. ! Ask your nurse or doctor about these medications  
  insulin  unit/mL injection  
 omeprazole 20 mg capsule Discharge Instructions Diabetes Sick-Day Plan: Care Instructions Your Care Instructions If you have diabetes, many other illnesses can make your blood sugar go up. This can be dangerous. When you are sick with the flu or another illness, your body releases hormones to fight infection. These hormones raise blood sugar levels and make it hard for insulin or other medicines to lower your blood sugar. Work with your doctor to make a plan for what to do on days when you are sick. Follow-up care is a key part of your treatment and safety. Be sure to make and go to all appointments, and call your doctor if you are having problems. It's also a good idea to know your test results and keep a list of the medicines you take. How can you care for yourself at home? · Work with your doctor to write up a sick-day plan for what to do on days when you are sick. Your blood sugar can go up or down, depending on your illness and whether you can keep food down. Call your doctor when you are sick, to see if you need to adjust your pills or insulin. · Write down the diabetes medicines you have been taking and whether you have changed the dose based on your sick-day plan. Have this information ready when you call your doctor. · Eat your normal types and amounts of food. Drink extra fluids, such as water, broth, and fruit juice, to prevent dehydration. ¨ If your blood sugar level is higher than the blood sugar level your doctor recommends (for example, above 240 milligrams per deciliter [mg/dL]), drink extra liquids that do not contain sugar, such as water or sugar-free cola. ¨ If you cannot eat your usual foods, drink extra liquids, such as soup, sports drinks, or milk. You may also eat food that is gentle on the stomach, such as crackers, gelatin dessert, or applesauce. Try to eat or drink 50 grams of carbohydrate every 3 to 4 hours. For example, 6 saltine crackers, 1 cup (8 ounces) of milk, and ½ cup (4 ounces) of orange juice each contain about 15 grams of carbohydrate. · Check your blood sugar at least every 3 to 4 hours. If it goes up fast, check it more often. And check it even through the night. Take insulin if your doctor told you to do so. If you and your doctor did not have a sick-day plan for taking extra insulin, call him or her for advice. · If you take insulin, check your urine or blood for ketones. This is especially important if your blood sugar is high. · Do not take any over-the-counter medicines, such as pain relievers, decongestants, or herbal products or other natural medicines, without talking with your doctor first. 
· Do not drive. If you need to see your doctor or go anywhere else, ask a family member or friend to drive you. When should you call for help? Call 911 anytime you think you may need emergency care. For example, call if: 
· You passed out (lost consciousness), or you suddenly become very sleepy or confused. (You may have very low blood sugar.) · You have symptoms of high blood sugar, such as: ¨ Blurred vision. ¨ Trouble staying awake or being woken up. ¨ Fast, deep breathing. ¨ Breath that smells fruity. ¨ Belly pain, not feeling hungry, and vomiting. ¨ Feeling confused. Call your doctor now or seek immediate medical care if: 
· You are sick and cannot control your blood sugar. · You have been vomiting or have had diarrhea for more than 6 hours. · Your blood sugar stays higher than the level your doctor has set for you. · You have symptoms of low blood sugar, such as: ¨ Sweating. ¨ Feeling nervous, shaky, and weak. ¨ Extreme hunger and slight nausea. ¨ Dizziness and headache. ¨ Blurred vision. ¨ Confusion. Watch closely for changes in your health, and be sure to contact your doctor if: 
· You have a hard time knowing when your blood sugar is low. · You have trouble keeping your blood sugar in the target range. · You often have problems controlling your blood sugar. · You have symptoms of long-term diabetes problems, such as: ¨ New vision changes. ¨ New pain, numbness, or tingling in your hands or feet. ¨ Skin problems. Where can you learn more? Go to http://uriel-reymundo.info/. Enter U060 in the search box to learn more about \"Diabetes Sick-Day Plan: Care Instructions. \" Current as of: May 23, 2016 Content Version: 11.2 © 2307-3276 [employer]. Care instructions adapted under license by [employer] (which disclaims liability or warranty for this information). If you have questions about a medical condition or this instruction, always ask your healthcare professional. [place] [address] any warranty or liability for your use of this information. DISCHARGE SUMMARY from Nurse The following personal items are in your possession at time of discharge: 
 
  
  
  
  
  
  
  
  
 
 
 
 
PATIENT INSTRUCTIONS: 
 
After general anesthesia or intravenous sedation, for 24 hours or while taking prescription Narcotics: · Limit your activities · Do not drive and operate hazardous machinery · Do not make important personal or business decisions · Do  not drink alcoholic beverages · If you have not urinated within 8 hours after discharge, please contact your surgeon on call. Report the following to your surgeon: 
· Excessive pain, swelling, redness or odor of or around the surgical area · Temperature over 100.5 · Nausea and vomiting lasting longer than 4 hours or if unable to take medications · Any signs of decreased circulation or nerve impairment to extremity: change in color, persistent  numbness, tingling, coldness or increase pain · Any questions What to do at Home: 
Recommended activity: Activity as tolerated *  Please give a list of your current medications to your Primary Care Provider.  
 
*  Please update this list whenever your medications are discontinued, doses are 
 changed, or new medications (including over-the-counter products) are added. *  Please carry medication information at all times in case of emergency situations. These are general instructions for a healthy lifestyle: No smoking/ No tobacco products/ Avoid exposure to second hand smoke Surgeon General's Warning:  Quitting smoking now greatly reduces serious risk to your health. Obesity, smoking, and sedentary lifestyle greatly increases your risk for illness A healthy diet, regular physical exercise & weight monitoring are important for maintaining a healthy lifestyle You may be retaining fluid if you have a history of heart failure or if you experience any of the following symptoms:  Weight gain of 3 pounds or more overnight or 5 pounds in a week, increased swelling in our hands or feet or shortness of breath while lying flat in bed. Please call your doctor as soon as you notice any of these symptoms; do not wait until your next office visit. Recognize signs and symptoms of STROKE: 
 
F-face looks uneven A-arms unable to move or move unevenly S-speech slurred or non-existent T-time-call 911 as soon as signs and symptoms begin-DO NOT go Back to bed or wait to see if you get better-TIME IS BRAIN. Warning Signs of HEART ATTACK Call 911 if you have these symptoms: 
? Chest discomfort. Most heart attacks involve discomfort in the center of the chest that lasts more than a few minutes, or that goes away and comes back. It can feel like uncomfortable pressure, squeezing, fullness, or pain. ? Discomfort in other areas of the upper body. Symptoms can include pain or discomfort in one or both arms, the back, neck, jaw, or stomach. ? Shortness of breath with or without chest discomfort. ? Other signs may include breaking out in a cold sweat, nausea, or lightheadedness. Don't wait more than five minutes to call [address]!  Fast action can save your life. Calling 911 is almost always the fastest way to get lifesaving treatment. Emergency Medical Services staff can begin treatment when they arrive  up to an hour sooner than if someone gets to the hospital by car. The discharge information has been reviewed with the patient and friend. The patient and friend verbalized understanding. Discharge medications reviewed with the patient and friend and appropriate educational materials and side effects teaching were provided. Discharge Orders None VHX Announcement We are excited to announce that we are making your provider's discharge notes available to you in VHX. You will see these notes when they are completed and signed by the physician that discharged you from your recent hospital stay. If you have any questions or concerns about any information you see in VHX, please call the Health Information Department where you were seen or reach out to your Primary Care Provider for more information about your plan of care. Introducing [facility]! [employer] introduces VHX patient portal. Now you can access parts of your medical record, email your doctor's office, and request medication refills online. 1. In your internet browser, go to [email or web address] 2. Click on the First Time User? Click Here link in the Sign In box. You will see the New Member Sign Up page. 3. Enter your VHX Access Code exactly as it appears below. You will not need to use this code after youve completed the sign-up process. If you do not sign up before the expiration date, you must request a new code. · VHX Access Code: [email or web address] Expires: 5/22/2017  5:04 PM 
 
4. Enter the last four digits of your Social Security Number (xxxx) and Date of Birth (mm/dd/yyyy) as indicated and click Submit. You will be taken to the next sign-up page. 5. Create a Cloudwise ID. This will be your Cloudwise login ID and cannot be changed, so think of one that is secure and easy to remember. 6. Create a Cloud[employer] password. You can change your password at any time. 7. Enter your Password Reset Question and Answer. This can be used at a later time if you forget your password. 8. Enter your e-mail address. You will receive e-mail notification when new information is available in [address] 9. Click Sign Up. You can now view and download portions of your medical record. 10. Click the Download Summary menu link to download a portable copy of your medical information. If you have questions, please visit the Frequently Asked Questions section of the Cloud[employer] website. Remember, Cloudwise is NOT to be used for urgent needs. For medical emergencies, dial 911. Now available from your iPhone and Android! General Information Please provide this summary of care documentation to your next provider. Patient Signature:  ____________________________________________________________ Date:  ____________________________________________________________  
  
Margot Hernandez Provider Signature:  ____________________________________________________________ Date:  ____________________________________________________________

## 2017-04-12 NOTE — ED NOTES
Dr. Gupta Early authorized holding lamictal tablets until patient condition improved enough to swallow tablets safely

## 2017-04-12 NOTE — H&P
Hospitalist Admission Note    NAME: Sonya Cordoba   :  1990   MRN:  [id number]     Date/Time:  2017 1:03 PM    Patient PCP: PROVIDER UNKNOWN  ________________________________________________________________________    My assessment of this patient's clinical condition and my plan of care is as follows. Assessment / Plan:  Acute encephalopathy/Unresponsiveness  Acute respiratory failure  Diabetic Ketoacidosis in the setting of [id number]    NPO for now  Strict I/Os  -BMP, Mg, PO4, lactate q4h  -aggressive IVF fluid hydration with NS; change to D5 1/2NS when blood glucose <250 x 2 checks  -will start bicarb gtt, wean as tolerated, s/p bicarb x 2 amps in ER  -insulin gtt until AG resolves; then overlap with SC insulin for 3-4h to avoid re-entry into DKA  -prn antiemetics   -diabetic education when able     Sepsis, POA  -unclear source again at this time  -UDS negative this time, alcohol level low  -pt unresponsive for unclear duration, concerning for possible aspiration - NPO for now  -UA neg, BC obtained  -cxr neg for evidence of infiltration    Will start empiric Rocephin for now  Follow Blood Cx     Acute renal failure POA  -likely secondary to pre-renal due to dehydration and DKA    Cont IVF  -monitor bmp       Bipolar disorder  -will need to continue lamictal when able to take PO intake  -likely need psych input when able        Code Status: Full  Surrogate Decision Maker: Maryanne Yan-parent per facebook at [phone]     DVT Prophylaxis: heparin   GI Prophylaxis: not indicated     Baseline: independent        Subjective:   CHIEF COMPLAINT: AMS at home    HISTORY OF PRESENT ILLNESS:     Ochoa Michael is a 23 y.o.  female who presents with above complains from home via EMS. Pt was found to be unresponsive at home this AM by room mate- EMS found pt to have very high Gluc on check.   H/o recent DKA admission in march at [facility]- then had been intubated in ER.  H/o Cocaine abuse in past  H/o non compliance with Insulin    Pt was found to be in DKA with severe acidosis in ER on workup with no obvious source of infection    We were asked to admit for work up and evaluation of the above problems. Past Medical History:   Diagnosis Date    [facility] 2017    Bipolar disorder ([facility]     Contraception management     Gyn: Dr. Quinton Lesches; Nexplanon placed  and removed 7/6/15 because she wanted to conceive    Depression     Diabetes in pregnancy     Diabetes type I ([facility]     Age 7; on insulin pump    HX OTHER MEDICAL     late pnc at 25 weeks    HX OTHER MEDICAL     polyhydramnios; previous pregnancy    Unspecified epilepsy without mention of intractable epilepsy     diabetic related        Past Surgical History:   Procedure Laterality Date     DELIVERY ONLY  , 14    2 cesareans       Social History   Substance Use Topics    Smoking status: Current Every Day Smoker     Packs/day: 0.50     Years: 7.00     Types: Cigarettes    Smokeless tobacco: Never Used    Alcohol use No        Family History   Problem Relation Age of Onset    Hypertension Maternal Grandmother     High Cholesterol Maternal Grandmother     Hypertension Maternal Grandfather     High Cholesterol Maternal Grandfather     No Known Problems Mother     No Known Problems Father     Cancer Paternal Grandfather     Other Sister      stomach issues/gluten-lactose sensitive     Allergies   Allergen Reactions    Other Medication Rash     Tegaderm    Sulfa (Sulfonamide Antibiotics) Hives        Prior to Admission medications    Medication Sig Start Date End Date Taking? Authorizing Provider   insulin NPH (NOVOLIN N, HUMULIN N) 100 unit/mL injection 15 Units by SubCUTAneous route Before breakfast and dinner. 3/15/17   Bobo Gruber MD   lamoTRIgine (LAMICTAL) 200 mg tablet Take 1 Tab by mouth two (2) times a day.  3/15/17   Bobo Gruber MD insulin lispro (HUMALOG) 100 unit/mL injection by SubCUTAneous route. 1 unit per 30 carbs    Historical Provider   insulin syringe-needle U-100 0.3 mL 31 gauge x 15/64\" syrg 1 Each by SubCUTAneous route five (5) times daily. Follow-up needed for refills 9/27/16   Franciso Riedel, MD       REVIEW OF SYSTEMS:     I am not able to complete the review of systems because: The patient is intubated and sedated   x The patient has altered mental status due to his acute medical problems    The patient has baseline aphasia from prior stroke(s)    The patient has baseline dementia and is not reliable historian    The patient is in acute medical distress and unable to provide information               Objective:   VITALS:    Visit Vitals    /60    Pulse (!) 111    Temp (!) 91.8 °F (33.2 °C)    Resp 28    Ht 5' 6\" (1.676 m)    Wt 52.2 kg (115 lb)    SpO2 96%    BMI 18.56 kg/m2       PHYSICAL EXAM:    General:    Alert, cooperative, no distress, appears stated age. HEENT: Atraumatic, anicteric sclerae, pink conjunctivae     No oral ulcers, mucosa Dry +, throat clear, dentition fair  Neck:  Supple, symmetrical,  thyroid: non tender  Lungs:   Clear to auscultation bilaterally. No Wheezing or Rhonchi. No rales. Chest wall:  No tenderness  No Accessory muscle use. Heart:   Regular  rhythm,  No  murmur   No edema  Abdomen:   Soft, non-tender. Not distended. Bowel sounds normal  Extremities: No cyanosis. No clubbing      Capillary refill normal,  Radial pulse 2+,  DP present  Skin:     Not pale. Not Jaundiced  No rashes   Psych:  poor insight. Not depressed. Not anxious or agitated. confused +  Neurologic: EOMs intact. No facial asymmetry. No aphasia or slurred speech. Symmetrical strength, Sensation grossly intact.  Alert and oriented X 4.     _______________________________________________________________________  Care Plan discussed with:    Comments   Patient x    Family      RN x    Care Manager Consultant:  ezio Webster   _______________________________________________________________________  Expected  Disposition:   Home with Family x   HH/PT/OT/RN ?   SNF/LTC    MAY    ________________________________________________________________________  TOTAL TIME:  72 Minutes    Critical Care Provided  72   Minutes non procedure based      Comments    x Reviewed previous records   >50% of visit spent in counseling and coordination of care  Discussion with patient and/or family and questions answered       ________________________________________________________________________  Signed: Herbert Belcher MD    Procedures: see electronic medical records for all procedures/Xrays and details which were not copied into this note but were reviewed prior to creation of Plan.     LAB DATA REVIEWED:    Recent Results (from the past 24 hour(s))   GLUCOSE, POC    Collection Time: 04/12/17  9:05 AM   Result Value Ref Range    Glucose (POC) >600 (HH) 65 - 100 mg/dL    Performed by [address], POC    Collection Time: 04/12/17  9:06 AM   Result Value Ref Range    Glucose (POC) >600 (HH) 65 - 100 mg/dL    Performed by Rolan Swain    ACETONE/KETONE, QL    Collection Time: 04/12/17  9:20 AM   Result Value Ref Range    Acetone/Ketone serum, Ql. POSITIVE (A) NEG        BLOOD GAS, ARTERIAL    Collection Time: 04/12/17  9:29 AM   Result Value Ref Range    pH 6.98 (LL) 7.35 - 7.45      PCO2 16 (L) 35.0 - 45.0 mmHg    PO2 134 (H) 80 - 100 mmHg    O2 SAT 97 92 - 97 %    BICARBONATE 4 (L) 22 - 26 mmol/L    BASE DEFICIT 26.5 mmol/L    O2 METHOD ROOM AIR      SPONTANEOUS RATE 28.0      Sample source ARTERIAL      SITE RIGHT RADIAL      DONN'S TEST YES      Critical value read back Israel Pozo MD    URINALYSIS W/ RFLX MICROSCOPIC    Collection Time: 04/12/17  9:40 AM   Result Value Ref Range    Color YELLOW/STRAW      Appearance CLEAR CLEAR      Specific gravity 1.024 1.003 - 1.030      pH (UA) 5.0 5.0 - 8.0 Protein 30 (A) NEG mg/dL    Glucose >1000 (A) NEG mg/dL    Ketone 80 (A) NEG mg/dL    Bilirubin NEGATIVE  NEG      Blood SMALL (A) NEG      Urobilinogen 0.2 0.2 - 1.0 EU/dL    Nitrites NEGATIVE  NEG      Leukocyte Esterase NEGATIVE  NEG      WBC 0-4 0 - 4 /hpf    RBC 0-5 0 - 5 /hpf    Epithelial cells FEW FEW /lpf    Bacteria NEGATIVE  NEG /hpf    Hyaline cast 2-5 0 - 5 /lpf   HCG URINE, QL    Collection Time: 04/12/17  9:40 AM   Result Value Ref Range    HCG urine, Ql. NEGATIVE  NEG     DRUG SCREEN, URINE    Collection Time: 04/12/17  9:40 AM   Result Value Ref Range    AMPHETAMINE NEGATIVE  NEG      BARBITURATES NEGATIVE  NEG      BENZODIAZEPINE NEGATIVE  NEG      COCAINE NEGATIVE  NEG      METHADONE NEGATIVE  NEG      OPIATES NEGATIVE  NEG      PCP(PHENCYCLIDINE) NEGATIVE  NEG      THC (TH-CANNABINOL) NEGATIVE  NEG      Drug screen comment (NOTE)    CBC WITH AUTOMATED DIFF    Collection Time: 04/12/17 10:32 AM   Result Value Ref Range    WBC 29.9 (H) 3.6 - 11.0 K/uL    RBC 4.27 3.80 - 5.20 M/uL    HGB 13.4 11.5 - 16.0 g/dL    HCT 41.3 35.0 - 47.0 %    MCV 96.7 80.0 - 99.0 FL    MCH 31.4 26.0 - 34.0 PG    MCHC 32.4 30.0 - 36.5 g/dL    RDW 14.3 11.5 - 14.5 %    PLATELET 016 251 - 707 K/uL    NEUTROPHILS 52 32 - 75 %    BAND NEUTROPHILS 12 (H) 0 - 6 %    LYMPHOCYTES 19 12 - 49 %    MONOCYTES 8 5 - 13 %    EOSINOPHILS 0 0 - 7 %    BASOPHILS 1 0 - 1 %    METAMYELOCYTES 3 %    MYELOCYTES 5 (H) 0 %    ABS. NEUTROPHILS 19.1 (H) 1.8 - 8.0 K/UL    ABS. LYMPHOCYTES 5.7 (H) 0.8 - 3.5 K/UL    ABS. MONOCYTES 2.4 (H) 0.0 - 1.0 K/UL    ABS. EOSINOPHILS 0.0 0.0 - 0.4 K/UL    ABS.  BASOPHILS 0.3 (H) 0.0 - 0.1 K/UL    RBC COMMENTS POLYCHROMASIA  1+        WBC COMMENTS SMUDGE CELLS SEEN      DF MANUAL     METABOLIC PANEL, COMPREHENSIVE    Collection Time: 04/12/17 10:32 AM   Result Value Ref Range    Sodium 133 (L) 136 - 145 mmol/L    Potassium 3.8 3.5 - 5.1 mmol/L    Chloride 91 (L) 97 - 108 mmol/L    CO2 <5 (LL) 21 - 32 mmol/L Anion gap Cannot be calulated 5 - 15 mmol/L    Glucose 1050 (HH) 65 - 100 mg/dL    BUN 32 (H) 6 - 20 MG/DL    Creatinine 2.30 (H) 0.55 - 1.02 MG/DL    BUN/Creatinine ratio 14 12 - 20      GFR est AA 31 (L) >60 ml/min/1.73m2    GFR est non-AA 26 (L) >60 ml/min/1.73m2    Calcium 8.0 (L) 8.5 - 10.1 MG/DL    Bilirubin, total 0.5 0.2 - 1.0 MG/DL    ALT (SGPT) 33 12 - 78 U/L    AST (SGOT) 61 (H) 15 - 37 U/L    Alk.  phosphatase 203 (H) 45 - 117 U/L    Protein, total 7.0 6.4 - 8.2 g/dL    Albumin 2.9 (L) 3.5 - 5.0 g/dL    Globulin 4.1 (H) 2.0 - 4.0 g/dL    A-G Ratio 0.7 (L) 1.1 - 2.2     CK    Collection Time: 04/12/17 10:32 AM   Result Value Ref Range     26 - 192 U/L   TROPONIN I    Collection Time: 04/12/17 10:32 AM   Result Value Ref Range    Troponin-I, Qt. <0.04 <0.05 ng/mL   NUCLEATED RBC    Collection Time: 04/12/17 10:32 AM   Result Value Ref Range    NRBC 0.0 0  WBC    ABSOLUTE NRBC 0.00 0.00 - 0.01 K/uL   PATHOLOGIST REVIEW    Collection Time: 04/12/17 10:32 AM   Result Value Ref Range    Pathologist review (NOTE)    GLUCOSE, POC    Collection Time: 04/12/17 10:39 AM   Result Value Ref Range    Glucose (POC) >600 (HH) 65 - 100 mg/dL    Performed by [address], POC    Collection Time: 04/12/17 10:41 AM   Result Value Ref Range    Glucose (POC) >600 (HH) 65 - 100 mg/dL    Performed by [address], POC    Collection Time: 04/12/17 11:55 AM   Result Value Ref Range    Glucose (POC) >600 (HH) 65 - 100 mg/dL    Performed by [address], POC    Collection Time: 04/12/17 11:57 AM   Result Value Ref Range    Glucose (POC) >600 (HH) 65 - 100 mg/dL    Performed by Myrlene Nissen    ETHYL ALCOHOL    Collection Time: 04/12/17 12:00 PM   Result Value Ref Range    ALCOHOL(ETHYL),SERUM <10 <10 MG/DL

## 2017-04-12 NOTE — PROGRESS NOTES
1900  Received report and assumed care of patient from Renee Joshi RN.  5868  Clarified potassium orders with . Patient is not safe to swallow pills due to decreased LOC. Received order to d/c current K orders and input 60meq via IV.  2000  Shift assessment completed. Patient opens eyes to voice, nods appropriately to questions, follows commands, but will not speak. 2333  O2 dropping into 80's, applied 2L NC  0000  Reassessment completed. Applied new pulse ox, removed NC, O2 100%. 0116  Q4 labs: Mag 1.4. Per order, must notify MD for Mag <1.5. HR also increasing and UO has decreased. No phosphorus labs being done, but lab value is included in protocol order. Paged hospitalist.   Darletta Shone returned page. Received order for 2gm Magnesium, 20 meq K over 2hrs, add Phosphorus to Q4 labs, and 250 cc NS bolus (advised MD of ). 0300  Gluco stabilizer now Q2 due to stable blood sugars for the last 3 hours. 0400  Reassessment completed. Unchanged. 0500  Gluco stabilizer Q1 due to drop in blood sugar. 0515  Phos 1.5, paged hospitalist to notify. [facility] returned page, notified of Phos 1.5, K 3.6, Na 152. MD to enter orders. 0700  Bedside and Verbal shift change report given to Lena Johns (oncoming nurse) by Monique Lewis (offgoing nurse). Report included the following information SBAR, Kardex, ED Summary, Intake/Output, MAR, Recent Results and Cardiac Rhythm Sinus Tach.

## 2017-04-12 NOTE — DIABETES MGMT
DTC Consult Note    Recommendations/ Comments:  When ready to transition off insulin gtt - consider NPH as consistent with pt's home management. Consult received for:  []             Assessment of home management                []      Medication Recommendations                []             Meter/monitoring     []             Insulin instruction     []             New diagnosis     []             Outpatient education     []             Insulin pump patient     [x]             Insulin infusion     []             DKA/HHS    Chart reviewed and initial evaluation complete on [address]. Patient is a 23 y.o. female with known Type 1 DM - Carrie Hedrick is well known to [address] [place] [address] staff for both in and outpatient services. Educator spoke with her at last visit 2/2017 at which time, she shared that her biggest barrier to her DM management is resources for insulin, syringes and she was seeing [facility] for her management. A1c:   Lab Results   Component Value Date/Time    Hemoglobin A1c 14.6 04/12/2017 10:32 AM       Recent Glucose Results:   Lab Results   Component Value Date/Time    GLU 1050 () 04/12/2017 10:32 AM    GLUCPOC >600 () 04/12/2017 01:05 PM    GLUCPOC >600 ([place]) 04/12/2017 01:03 PM    GLUCPOC >600 ([place]) 04/12/2017 11:57 AM        Lab Results   Component Value Date/Time    Creatinine 2.30 04/12/2017 10:32 AM       Active Orders   Diet    DIET NPO        PO intake: No data found. Current hospital DM medication: insulin gtt    Will continue to follow as needed. Thank you.   Patricia Shearer RD, CDE

## 2017-04-12 NOTE — ED NOTES
Found down an hour ago, insulin dependent diabetic. Hx seizures, responds to painful stimulus. Unknown time down, blood glucose just registered high by ems.

## 2017-04-12 NOTE — PROGRESS NOTES
PICC (Peripherally Inserted Central Catheter) line insertion  procedure note :     Patient unable to give consent and family was not available. Procedure was considered emergent and approved by Dr. Juan C Cordoba and Dr. Raudel Zapata to proceed. Timeout completed. Pre-procedure assessment done. Maximum sterile barrier precautions observed throughout procedure. Lidocaine 1%  3.0    ml sq given prior to cannulation. Cannulated Basilic  vein using ultrasound guidance and modified seldinger technique. Inserted 6  [place] triple  lumen PICC to Right arm using Pcsso Tip Location System and  [address]. Pt has    sinus   rhythm. PICC tip location was confirmed by 3 CG tip positioning system, indicating tall P wave and no negative deflection before P wave which would indicate that the PICC tip is properly placed in the distal SVC or at the [place]. PICC tip location was  confirmed by 2 PICC nurses and 3CG printout placed on patient's chart. Blood return verified and flushed with 20 ml normal saline in each port. Sterile dressing applied with biopatch, statLock and occlusive dressing as per protocol. Curos caps applied to each port. Patient tolerated procedure well with minimal blood loss ( less than 5 ml.)   Patient education material provided. PICC procedure performed by  : Anna Bajwa RN. PICC nurse  Assisted by :   Maykel Kohler RN  PICC nurse  Reason for access : Hemodynamically unstable  Complications related to insertion  : none  X-Ray : not applicable  Notified primary nurse  Sommer Pedersen RN  that  PICC line can be used.    Total Trimmed Length :  38   cm   External Length : 2  cm   PICC line site arm circumference:  24   cm   PICC catheter occupies  14   % of vein  Type of PICC: Bard Solo Power PICC   Ref # :    [id number]     Lot # :  [id number]   Expiration Date : 02/28/2018    Anna Bajwa, [address], PICC Nurse, Vascular Access Team.

## 2017-04-12 NOTE — ED PROVIDER NOTES
HPI Comments: Erika Moreno is a 23 y.o. female hx of IDDM type I, DKA, non-compliance, and seizures who presents via EMS to the ED for unresponsiveness. Per EMS, pt was found unresponsive at approximately 0800 this morning. EMS reported checking BS on scene which read as \"high\" and subsequently began administering IV fluids en route. Per records, pt was admitted to ED [facility] from 3/11 to 3/15 for DKA after pt presented to ED unresponsive and subsequently intubated. Additional Dx during admission included: lactic acidosis, acute respiratory failure, acute encephalopathy, SIRS, hypotension, YVROSE, and cocaine use. Pt was also admitted to hospital for same from  to . Of note, pt is on lamictal for seizures. PCP: PROVIDER UNKNOWN    History is limited by condition of patient. Pt is unresponsive. The history is provided by the EMS personnel. The history is limited by the condition of the patient. Past Medical History:   Diagnosis Date    ARF (acute renal failure) ([facility] [address] 2017    Bipolar disorder ([facility] [address]     Contraception management     Gyn: Dr. Pino Lines;  Nexplanon placed  and removed 7/6/15 because she wanted to conceive    Depression     Diabetes in pregnancy     Diabetes type I ([facility] [address]     Age 7; on insulin pump    HX OTHER MEDICAL     late pnc at 25 weeks    HX OTHER MEDICAL     polyhydramnios; previous pregnancy    Unspecified epilepsy without mention of intractable epilepsy     diabetic related       Past Surgical History:   Procedure Laterality Date     DELIVERY ONLY  , 13    2 cesareans         Family History:   Problem Relation Age of Onset    Hypertension Maternal Grandmother     High Cholesterol Maternal Grandmother     Hypertension Maternal Grandfather     High Cholesterol Maternal Grandfather     No Known Problems Mother     No Known Problems Father     Cancer Paternal Grandfather     Other Sister      stomach issues/gluten-lactose sensitive Social History     Social History    Marital status: SINGLE     Spouse name: N/A    Number of children: N/A    Years of education: N/A     Occupational History    Not on file. Social History Main Topics    Smoking status: Current Every Day Smoker     Packs/day: 0.50     Years: 7.00     Types: Cigarettes    Smokeless tobacco: Never Used    Alcohol use No    Drug use: No    Sexual activity: Yes     Partners: Male     Birth control/ protection: Pill     Other Topics Concern    Not on file     Social History Narrative         ALLERGIES: Other medication and Sulfa (sulfonamide antibiotics)    Review of Systems   Unable to perform ROS: Patient unresponsive       Patient Vitals for the past 12 hrs:   Temp Pulse Resp BP SpO2   04/12/17 1515 - (!) 121 29 101/65 95 %   04/12/17 1500 - (!) 123 27 106/65 98 %   04/12/17 1445 - (!) 126 26 104/67 97 %   04/12/17 1430 - (!) 125 26 107/62 96 %   04/12/17 1415 - (!) 123 26 103/58 96 %   04/12/17 1400 - (!) 124 28 112/67 97 %   04/12/17 1345 - (!) 121 (!) 31 106/67 95 %   04/12/17 1342 - (!) 120 30 103/66 95 %   04/12/17 1340 97.1 °F (36.2 °C) (!) 121 - - -   04/12/17 1330 - (!) 121 (!) 33 97/67 95 %   04/12/17 1315 - (!) 119 22 106/71 96 %   04/12/17 1300 - (!) 116 25 107/68 97 %   04/12/17 1230 - (!) 111 28 - 96 %   04/12/17 1200 - (!) 104 25 - 98 %   04/12/17 1145 - (!) 104 24 107/60 98 %   04/12/17 1105 - 100 (!) 35 106/66 95 %   04/12/17 1102 (!) 91.8 °F (33.2 °C) - - - -   04/12/17 1045 - (!) 103 23 104/66 99 %   04/12/17 1030 - 99 25 97/68 97 %   04/12/17 1015 - 98 24 109/56 99 %   04/12/17 1000 - 97 19 105/53 99 %   04/12/17 0945 - (!) 101 28 98/45 99 %   04/12/17 0930 - 94 24 114/60 99 %   04/12/17 0920 - 90 25 103/54 (!) 86 %   04/12/17 0907 - 91 26 (!) 88/62 99 %   04/12/17 0901 - 94 24 97/66 97 %            Physical Exam   Constitutional: She appears well-developed and well-nourished. HENT:   Head: Normocephalic and atraumatic.    Eyes: Pupils are equal, round, and reactive to light. Neck: Normal range of motion. Neck supple. Cardiovascular: Normal rate and normal heart sounds. tachycardic   Pulmonary/Chest: Effort normal and breath sounds normal. No respiratory distress. She has no decreased breath sounds. She has no wheezes. She has no rhonchi. She has no rales. Breathing spontaneously   Abdominal: Soft. Bowel sounds are normal. She exhibits no mass. Musculoskeletal: Normal range of motion. She exhibits no edema. Neurological: She is unresponsive. Positive gag reflex   Skin: Skin is warm and dry. Nursing note and vitals reviewed. MDM  Number of Diagnoses or Management Options  Acidosis, metabolic:   Acute encephalopathy:   Acute renal failure, unspecified acute renal failure type [facility]):   Dehydration:   DKA, type 1, not at goal [facility]):   Leukocytosis, unspecified type:   Sinus tachycardia:   Diagnosis management comments: DDx: DKA, seizure, electrolyte abnormality, UTI       Amount and/or Complexity of Data Reviewed  Clinical lab tests: ordered and reviewed  Obtain history from someone other than the patient: yes (EMS)  Review and summarize past medical records: yes  Discuss the patient with other providers: yes (Hospitalist)    Critical Care  Total time providing critical care:  minutes    Patient Progress  Patient progress: stable    ED Course       Procedures    PROGRESS NOTE:  10:00 AM  Pt's roommate now at bedside. Reports pt was vomiting last night. He wanted pt to come in to hospital for evaluation, but pt would not come. He reports last seeing her awake at 0200 this morning, and found her unresponsive this morning with urinary incontinence. He is unsure if pt had had a seizure  Written by ALVAREZ Oro, as dictated by Governor MD Goran    PROGRESS NOTE:  10:58 AM  Pt reevaluated.  She is now talking and moving all extremities, but is not oriented to self as she said her name is Lena  Written by Hugo Maria Levi Conti, ED Scribe, as dictated by Nely Dc MD.    PROGRESS NOTE:  11:35 AM  Glucose came back at 1050. Will consult with hospitalist  Written by Rober Esteban ED Scribe, as dictated by Nely Dc MD    CONSULT NOTE:   11:38 AM  Nely Dc MD spoke with Dr Kate Dominguez,   Specialty: Hospitalist  Discussed pt's hx, disposition, and available diagnostic and imaging results. Reviewed care plans. Consultant will evaluate pt for admission. Requesting 2 more liters of fluid and dose of bicarb. Written by Rober Esteban ED Scribe, as dictated by Nely Dc MD.    PROGRESS NOTE:  2:15 PM  Hospitalist requesting PICC line be placed. Creatinine of 2.3, As pt can not give consent due to her state, myself and hospitalist will place order for PICC line     PROGRESS NOTE:  3:16 PM  Pt has indicated that she might have some nausea. Will order Zofran  Written by Rober Esteban ED Scribe, as dictated by Nely Dc MD.    CRITICAL CARE NOTE :  4:04 PM  IMPENDING DETERIORATION -Cardiovascular, CNS, Metabolic and Renal  ASSOCIATED RISK FACTORS - Hypotension, Dysrhythmia, Metabolic changes, Dehydration and CNS Decompensation  MANAGEMENT- Bedside Assessment and Supervision of Care  INTERPRETATION -  Xrays, Blood Gases, ECG and Blood Pressure  INTERVENTIONS - hemodynamic mngmt and Metobolic interventions  CASE REVIEW - Hospitalist, Nursing and Family  TREATMENT RESPONSE -Improved  PERFORMED BY - Self  NOTES   :  I have spent 90 minutes of critical care time involved in lab review, consultations with specialist, family decision- making, bedside attention and documentation. During this entire length of time I was immediately available to the patient .   Nely Dc MD    LABORATORY TESTS:  Recent Results (from the past 12 hour(s))   GLUCOSE, POC    Collection Time: 04/12/17  9:05 AM   Result Value Ref Range    Glucose (POC) >600 (HH) 65 - 100 mg/dL    Performed by Evangelina Sanchez GLUCOSE, POC    Collection Time: 04/12/17  9:06 AM   Result Value Ref Range    Glucose (POC) >600 (HH) 65 - 100 mg/dL    Performed by Mark Anthony Paulino QL    Collection Time: 04/12/17  9:20 AM   Result Value Ref Range    Acetone/Ketone serum, Ql. POSITIVE (A) NEG        BLOOD GAS, ARTERIAL    Collection Time: 04/12/17  9:29 AM   Result Value Ref Range    pH 6.98 (LL) 7.35 - 7.45      PCO2 16 (L) 35.0 - 45.0 mmHg    PO2 134 (H) 80 - 100 mmHg    O2 SAT 97 92 - 97 %    BICARBONATE 4 (L) 22 - 26 mmol/L    BASE DEFICIT 26.5 mmol/L    O2 METHOD ROOM AIR      SPONTANEOUS RATE 28.0      Sample source ARTERIAL      SITE RIGHT RADIAL      DONN'S TEST YES      Critical value read back Key Rayo MD    URINALYSIS W/ RFLX MICROSCOPIC    Collection Time: 04/12/17  9:40 AM   Result Value Ref Range    Color YELLOW/STRAW      Appearance CLEAR CLEAR      Specific gravity 1.024 1.003 - 1.030      pH (UA) 5.0 5.0 - 8.0      Protein 30 (A) NEG mg/dL    Glucose >1000 (A) NEG mg/dL    Ketone 80 (A) NEG mg/dL    Bilirubin NEGATIVE  NEG      Blood SMALL (A) NEG      Urobilinogen 0.2 0.2 - 1.0 EU/dL    Nitrites NEGATIVE  NEG      Leukocyte Esterase NEGATIVE  NEG      WBC 0-4 0 - 4 /hpf    RBC 0-5 0 - 5 /hpf    Epithelial cells FEW FEW /lpf    Bacteria NEGATIVE  NEG /hpf    Hyaline cast 2-5 0 - 5 /lpf   HCG URINE, QL    Collection Time: 04/12/17  9:40 AM   Result Value Ref Range    HCG urine, Ql. NEGATIVE  NEG     DRUG SCREEN, URINE    Collection Time: 04/12/17  9:40 AM   Result Value Ref Range    AMPHETAMINE NEGATIVE  NEG      BARBITURATES NEGATIVE  NEG      BENZODIAZEPINE NEGATIVE  NEG      COCAINE NEGATIVE  NEG      METHADONE NEGATIVE  NEG      OPIATES NEGATIVE  NEG      PCP(PHENCYCLIDINE) NEGATIVE  NEG      THC (TH-CANNABINOL) NEGATIVE  NEG      Drug screen comment (NOTE)    HEMOGLOBIN A1C WITH EAG    Collection Time: 04/12/17 10:32 AM   Result Value Ref Range    Hemoglobin A1c 14.6 (H) 4.2 - 6.3 %    Est. average glucose 372 mg/dL   CBC WITH AUTOMATED DIFF    Collection Time: 04/12/17 10:32 AM   Result Value Ref Range    WBC 29.9 (H) 3.6 - 11.0 K/uL    RBC 4.27 3.80 - 5.20 M/uL    HGB 13.4 11.5 - 16.0 g/dL    HCT 41.3 35.0 - 47.0 %    MCV 96.7 80.0 - 99.0 FL    MCH 31.4 26.0 - 34.0 PG    MCHC 32.4 30.0 - 36.5 g/dL    RDW 14.3 11.5 - 14.5 %    PLATELET 024 850 - 476 K/uL    NEUTROPHILS 52 32 - 75 %    BAND NEUTROPHILS 12 (H) 0 - 6 %    LYMPHOCYTES 19 12 - 49 %    MONOCYTES 8 5 - 13 %    EOSINOPHILS 0 0 - 7 %    BASOPHILS 1 0 - 1 %    METAMYELOCYTES 3 %    MYELOCYTES 5 (H) 0 %    ABS. NEUTROPHILS 19.1 (H) 1.8 - 8.0 K/UL    ABS. LYMPHOCYTES 5.7 (H) 0.8 - 3.5 K/UL    ABS. MONOCYTES 2.4 (H) 0.0 - 1.0 K/UL    ABS. EOSINOPHILS 0.0 0.0 - 0.4 K/UL    ABS. BASOPHILS 0.3 (H) 0.0 - 0.1 K/UL    RBC COMMENTS POLYCHROMASIA  1+        WBC COMMENTS SMUDGE CELLS SEEN      DF MANUAL     METABOLIC PANEL, COMPREHENSIVE    Collection Time: 04/12/17 10:32 AM   Result Value Ref Range    Sodium 133 (L) 136 - 145 mmol/L    Potassium 3.8 3.5 - 5.1 mmol/L    Chloride 91 (L) 97 - 108 mmol/L    CO2 <5 (LL) 21 - 32 mmol/L    Anion gap Cannot be calulated 5 - 15 mmol/L    Glucose 1050 (HH) 65 - 100 mg/dL    BUN 32 (H) 6 - 20 MG/DL    Creatinine 2.30 (H) 0.55 - 1.02 MG/DL    BUN/Creatinine ratio 14 12 - 20      GFR est AA 31 (L) >60 ml/min/1.73m2    GFR est non-AA 26 (L) >60 ml/min/1.73m2    Calcium 8.0 (L) 8.5 - 10.1 MG/DL    Bilirubin, total 0.5 0.2 - 1.0 MG/DL    ALT (SGPT) 33 12 - 78 U/L    AST (SGOT) 61 (H) 15 - 37 U/L    Alk.  phosphatase 203 (H) 45 - 117 U/L    Protein, total 7.0 6.4 - 8.2 g/dL    Albumin 2.9 (L) 3.5 - 5.0 g/dL    Globulin 4.1 (H) 2.0 - 4.0 g/dL    A-G Ratio 0.7 (L) 1.1 - 2.2     CK    Collection Time: 04/12/17 10:32 AM   Result Value Ref Range     26 - 192 U/L   TROPONIN I    Collection Time: 04/12/17 10:32 AM   Result Value Ref Range    Troponin-I, Qt. <0.04 <0.05 ng/mL   NUCLEATED RBC    Collection Time: 04/12/17 10:32 AM Result Value Ref Range    NRBC 0.0 0  WBC    ABSOLUTE NRBC 0.00 0.00 - 0.01 K/uL   PATHOLOGIST REVIEW    Collection Time: 04/12/17 10:32 AM   Result Value Ref Range    Pathologist review (NOTE)    PHOSPHORUS    Collection Time: 04/12/17 10:32 AM   Result Value Ref Range    Phosphorus 9.3 (H) 2.6 - 4.7 MG/DL   GLUCOSE, POC    Collection Time: 04/12/17 10:39 AM   Result Value Ref Range    Glucose (POC) >600 (HH) 65 - 100 mg/dL    Performed by [id number], POC    Collection Time: 04/12/17 10:41 AM   Result Value Ref Range    Glucose (POC) >600 (HH) 65 - 100 mg/dL    Performed by [address], POC    Collection Time: 04/12/17 11:55 AM   Result Value Ref Range    Glucose (POC) >600 (HH) 65 - 100 mg/dL    Performed by [id number], POC    Collection Time: 04/12/17 11:57 AM   Result Value Ref Range    Glucose (POC) >600 (HH) 65 - 100 mg/dL    Performed by Jaylin Wright    LACTIC ACID, PLASMA    Collection Time: 04/12/17 11:59 AM   Result Value Ref Range    Lactic acid 1.7 0.4 - 2.0 MMOL/L   ETHYL ALCOHOL    Collection Time: 04/12/17 12:00 PM   Result Value Ref Range    ALCOHOL(ETHYL),SERUM <10 <10 MG/DL   GLUCOSE, POC    Collection Time: 04/12/17  1:03 PM   Result Value Ref Range    Glucose (POC) >600 (HH) 65 - 100 mg/dL    Performed by [id number], POC    Collection Time: 04/12/17  1:05 PM   Result Value Ref Range    Glucose (POC) >600 (HH) 65 - 100 mg/dL    Performed by [id number], POC    Collection Time: 04/12/17  2:09 PM   Result Value Ref Range    Glucose (POC) 504 (H) 65 - 100 mg/dL    Performed by Jaylen Guerra    GLUCOSE, POC    Collection Time: 04/12/17  3:08 PM   Result Value Ref Range    Glucose (POC) 346 (H) 65 - 100 mg/dL    Performed by Jaylen Guerra        IMAGING RESULTS:  CXR Results  (Last 48 hours)               04/12/17 1126  XR CHEST PORT Final result    Impression:  IMPRESSION: Bibasilar atelectasis.  No definite focal or confluent airspace   disease. Narrative:  EXAM:  XR CHEST PORT. INDICATION: Diabetic ketoacidosis with leukocytosis. COMPARISON: 3/13/2017. FINDINGS:    A portable AP radiograph of the chest was obtained at 1119 hours. The   endotracheal tube, nasogastric tube and right jugular catheter have been   removed. Lines and tubes: The patient is on a cardiac monitor. Lungs: There is mild atelectasis at the lung bases. The lungs are otherwise   clear. Pleura: There is no pneumothorax or pleural effusion. Mediastinum: The cardiac and mediastinal contours and pulmonary vascularity are   normal.   Bones and soft tissues: The bones and soft tissues are grossly within normal   limits.                  MEDICATIONS GIVEN:  Medications   insulin lispro (HUMALOG) injection (0 Units SubCUTAneous Held 4/12/17 1130)   glucose chewable tablet 16 g (not administered)   dextrose (D50W) injection syrg 12.5-25 g (not administered)   glucagon (GLUCAGEN) injection 1 mg (not administered)   sodium bicarbonate (8.4%) 150 mEq in sterile water 1,000 mL infusion ( IntraVENous New Bag 4/12/17 1538)   lamoTRIgine (LaMICtal) tablet 200 mg (0 mg Oral Held 4/12/17 1250)   sodium chloride (NS) flush 5-10 mL (not administered)   sodium chloride (NS) flush 5-10 mL (not administered)   insulin regular (NOVOLIN R, HUMULIN R) 100 Units in 0.9% sodium chloride 100 mL infusion (14.3 Units/hr IntraVENous New Bag 4/12/17 1508)   heparin (porcine) injection 5,000 Units (5,000 Units SubCUTAneous Given 4/12/17 1211)   cefTRIAXone (ROCEPHIN) 1 g in 0.9% sodium chloride (MBP/ADV) 50 mL (0 g IntraVENous IV Completed 4/12/17 1324)   insulin regular (NOVOLIN R, HUMULIN R) injection 10 Units (10 Units IntraVENous Given 4/12/17 0926)   sodium chloride 0.9 % bolus infusion 1,000 mL (0 mL IntraVENous IV Completed 4/12/17 1214)   sodium bicarbonate 8.4 % (1 mEq/mL) injection 50 mEq (50 mEq IntraVENous Given 4/12/17 0926)   sodium chloride 0.9 % bolus infusion 2,000 mL (0 mL IntraVENous IV Completed 4/12/17 1539)   sodium bicarbonate 8.4 % (1 mEq/mL) injection 50 mEq (50 mEq IntraVENous Given 4/12/17 1210)   ondansetron (ZOFRAN) injection 4 mg (4 mg IntraVENous Given 4/12/17 1521)       IMPRESSION:  1. DKA, type 1, not at goal [facility])    2. Acute renal failure, unspecified acute renal failure type (HCC)    3. Dehydration    4. Acidosis, metabolic    5. Leukocytosis, unspecified type    6. Acute encephalopathy    7. Sinus tachycardia        PLAN:  1. Admit    11:39 AM  Patient is being admitted to the hospital by Dr. Mary Valdivia. The results of their tests and reasons for their admission have been discussed with them and/or available family. They convey agreement and understanding for the need to be admitted and for their admission diagnosis. Consultation has been made with the inpatient physician specialist for hospitalization. This note is prepared by Rober Esteban, acting as Scribe for Nely Dc MD.    Nely Dc MD: The scribe's documentation has been prepared under my direction and personally reviewed by me in its entirety. I confirm that the note above accurately reflects all work, treatment, procedures, and medical decision making performed by me.

## 2017-04-12 NOTE — PROGRESS NOTES
Pt is in emergent need of Central venous/PICC access in my opinion. Pt cannot consent for the procedure at this time.

## 2017-04-12 NOTE — ED NOTES
Blood glucose now at a measurable level. 504.  picc team called and said they were attempting to gain consent for pt and would place line once consent was obtained

## 2017-04-12 NOTE — PROGRESS NOTES
[facility] Consult Service Progress NOTE  Pulmonary, Critical Care, and Sleep Medicine    Name: Audra Mathew MRN: [id number]   : 1990 Hospital: [facility]   Date: 2017  Admission Date: 2017     Chart and notes reviewed. Data reviewed. Pt seen on rounds earlier today. I have evaluated and examined the patient. Pt is acutely ill. Reviewed the evaluation and will assist in implementing the plan as outlined by Dr. Flavio Banda and team    Audra Mathew is a 23 y.o. female hx of IDDM type I, DKA, non-compliance, and seizures who presents via EMS to the ED for unresponsiveness. Per EMS, pt was found unresponsive at approximately 0800 this morning. EMS reported checking BS on scene which read as \"high\" and subsequently began administering IV fluids en route. Per records, pt was admitted to [id number] [place] [address] from 3/11 to 3/15 for DKA after pt presented to ED unresponsive and subsequently intubated. Additional Dx during admission included: lactic acidosis, acute respiratory failure, acute encephalopathy, SIRS, hypotension, YVROSE, and cocaine use. Pt was also admitted to hospital for same from  to . Of note, pt is on lamictal for seizures. Pt told staff last admission that she could not afford meds. Lamictal worked but was not taking. Pt does not go to the free clinic which will provide meds at no cost. No details of what happened since last admission. No evidence of sepsis. Pregnancy test negative. UDS negative.  Blood cultures sent    Hospital Day: 1        IMPRESSION:   · Acute encephalopathy/Unresponsiveness  · Diabetic Ketoacidosis in the setting of [id number]  · Acute renal failure POA-likely secondary to pre-renal due to dehydration and DKA  · Bipolar disorder-will need to continue lamictal when able to take PO intake    · Pseudohyponatremia      Code Status: Full  Surrogate Decision Maker: Mercedez Cintron per facebook at [phone]    DVT Prophylaxis: heparin   GI Prophylaxis: not indicated    Baseline: independent  Pt is critically ill and at high risk of end organ dysfunction and deterioration      RECOMMENDATIONS/PLAN:   · NPO for now  · Strict I/Os  · BMP, Mg, PO4, lactate q4h  · IVF fluid hydration with NS; change to D5 1/2NS when blood glucose <250 x 2 checks  · will start bicarb gtt, wean as tolerated, s/p bicarb x 2 amps in ER  · insulin gtt until AG resolves; then overlap with SC insulin for 3-4h to avoid re-entry into DKA  · prn antiemetics   · diabetic education when able  · Will be available to assist in medical management while in the CCU pending disposition     Code: Prior          Vital Signs: Intake/Output: Intake/Output:   Visit Vitals    /65    Pulse (!) 121    Temp 97.1 °F (36.2 °C)    Resp 29    Ht 5' 6\" (1.676 m)    Wt 52.2 kg (115 lb)    SpO2 95%    BMI 18.56 kg/m2      O2 Device: Room air     Temp (24hrs), Av.5 °F (34.7 °C), Min:91.8 °F (33.2 °C), Max:97.1 °F (36.2 °C)     Intake/Output Summary (Last 24 hours) at 17 1538  Last data filed at 17 1419   Gross per 24 hour   Intake             2050 ml   Output              200 ml   Net             1850 ml    Last shift:      701 -  190  In: 0 [I.V.:]  Out: 200 [Urine:200]  Last 3 shifts:           Telemetry:    normal sinus rhythm    Physical Exam:    General: young tall lean WF in no respiratory distress and acyanotic, appears dehydrated, moderately ill and disoriented   HEENT: NCAT, no nasal flaring; dry   Neck: No abnormally enlarged lymph nodes.    Chest: normal   Lungs: decreased air exchange bilaterally; mild tachypnea   Heart: Regular rate and rhythm or S1S2 present   Abdomen: soft, non-tender, without masses or organomegaly   :    Extremity: negative, cyanosis, clubbing;    Neuro: obtunded   Psych: unable to assess   Skin: Pallor;   Pulses: Bilateral, Radial, 2+ Normal upper and lower extremity pulses   Capillary refill: abnormal:  sluggish capillary refill, pale;      DATA:    MAR reviewed and pertinent medications noted or modified as needed    MEDS:   Current Facility-Administered Medications   Medication    insulin lispro (HUMALOG) injection    glucose chewable tablet 16 g    dextrose (D50W) injection syrg 12.5-25 g    glucagon (GLUCAGEN) injection 1 mg    sodium bicarbonate (8.4%) 150 mEq in sterile water 1,000 mL infusion    lamoTRIgine (LaMICtal) tablet 200 mg    sodium chloride (NS) flush 5-10 mL    sodium chloride (NS) flush 5-10 mL    insulin regular (NOVOLIN R, HUMULIN R) 100 Units in 0.9% sodium chloride 100 mL infusion    heparin (porcine) injection 5,000 Units    cefTRIAXone (ROCEPHIN) 1 g in 0.9% sodium chloride (MBP/ADV) 50 mL     Current Outpatient Prescriptions   Medication Sig    insulin NPH (NOVOLIN N, HUMULIN N) 100 unit/mL injection 15 Units by SubCUTAneous route Before breakfast and dinner.  lamoTRIgine (LAMICTAL) 200 mg tablet Take 1 Tab by mouth two (2) times a day.  insulin lispro (HUMALOG) 100 unit/mL injection by SubCUTAneous route. 1 unit per 30 carbs    insulin syringe-needle U-100 0.3 mL 31 gauge x 15/64\" syrg 1 Each by SubCUTAneous route five (5) times daily.  Follow-up needed for refills          Labs:  Recent Labs      04/12/17   1032   WBC  29.9*   HGB  13.4   PLT  230     Recent Labs      04/12/17   1159  04/12/17   1032   NA   --   133*   K   --   3.8   CL   --   91*   CO2   --   <5*   GLU   --   1050*   BUN   --   32*   CREA   --   2.30*   CA   --   8.0*   PHOS   --   9.3*   LAC  1.7   --    ALB   --   2.9*   SGOT   --   61*   ALT   --   33     Recent Labs      04/12/17   0929   PH  6.98*   PCO2  16*   PO2  134*   HCO3  4*     Recent Labs      04/12/17   0929   PH  6.98*   PCO2  16*   PO2  134*   HCO3  4*     Recent Labs      04/12/17   1032   CPK  129   TROIQ  <0.04       No results found for: IRON, FE, TIBC, IBCT, PSAT, FERR    Lab Results   Component Value Date/Time    TSH 1.65 01/04/2017 07:10 PM        Lab Results   Component Value Date/Time     04/12/2017 10:32 AM    Troponin-I, Qt. <0.04 04/12/2017 10:32 AM        Lab Results   Component Value Date/Time    Culture result: NO GROWTH 6 DAYS 03/11/2017 12:51 PM    Culture result: NO GROWTH 6 DAYS 03/11/2017 12:51 PM    Culture result: NO GROWTH 2 DAYS 02/21/2017 04:31 PM       No results found for: TOXA1, RPR, HBCM, HBSAG, HAAB, HCAB, HCAB1, HAAT, G6PD, CRYAC, HIVGT, HIVR, HIV1, HIV12, HIVPC, HIVRPI    Lab Results   Component Value Date/Time     04/12/2017 10:32 AM       Lab Results   Component Value Date/Time    Color YELLOW/STRAW 04/12/2017 09:40 AM    Appearance CLEAR 04/12/2017 09:40 AM    Specific gravity 1.020 12/29/2012 02:00 PM    pH (UA) 5.0 04/12/2017 09:40 AM    Protein 30 04/12/2017 09:40 AM    Glucose >1000 04/12/2017 09:40 AM    Ketone 80 04/12/2017 09:40 AM    Bilirubin NEGATIVE  04/12/2017 09:40 AM    Blood SMALL 04/12/2017 09:40 AM    Urobilinogen 0.2 04/12/2017 09:40 AM    Nitrites NEGATIVE  04/12/2017 09:40 AM    Leukocyte Esterase NEGATIVE  04/12/2017 09:40 AM    WBC 0-4 04/12/2017 09:40 AM    RBC 0-5 04/12/2017 09:40 AM    Bacteria NEGATIVE  04/12/2017 09:40 AM       Imaging:  []                           I have personally reviewed the patients radiographs and reports:        Results from Hospital Encounter encounter on 04/12/17   XR CHEST PORT   Narrative EXAM:  XR CHEST PORT. INDICATION: Diabetic ketoacidosis with leukocytosis. COMPARISON: 3/13/2017. FINDINGS:   A portable AP radiograph of the chest was obtained at 1119 hours. The  endotracheal tube, nasogastric tube and right jugular catheter have been  removed. Lines and tubes: The patient is on a cardiac monitor. Lungs: There is mild atelectasis at the lung bases. The lungs are otherwise  clear. Pleura: There is no pneumothorax or pleural effusion.   Mediastinum: The cardiac and mediastinal contours and pulmonary vascularity are  normal.  Bones and soft tissues: The bones and soft tissues are grossly within normal  limits. Impression IMPRESSION: Bibasilar atelectasis. No definite focal or confluent airspace  disease. Results from [place] encounter on 03/11/17   CT HEAD WO CONT   Narrative EXAM:  CT HEAD WO CONT    INDICATION:   Arrival via EMS from home. Found at home unresponsive with shallow  respiration and high blood glucose reading level. Recently admitted 2/21/2017  for diabetic ketoacidosis. COMPARISON: CT September 24, 2008    TECHNIQUE: Unenhanced CT of the head was performed using 5 mm images. Brain and  bone windows were generated. CT dose reduction was achieved through use of a  standardized protocol tailored for this examination and automatic exposure  control for dose modulation. FINDINGS:  The ventricles and sulci are normal in size, shape and configuration and  midline. There is no significant white matter disease. There is no intracranial  hemorrhage, extra-axial collection, mass, mass effect or midline shift. The  basilar cisterns are open. No acute infarct is identified. The bone windows  demonstrate no abnormalities. The visualized portions of the paranasal sinuses  and mastoid air cells are clear.          Impression IMPRESSION: No acute findings            Angela Weber MD

## 2017-04-12 NOTE — ED NOTES
Pt now has 2 working iv sites. Pt has begun to moan and roll about in bed. She is attempting to speak, speech is still slurred and incomprehensible. Eyes are opening spontaneously. Temperature has gone up to 92.1, chidi ortizggjulien continues. Dawson Hairston evaluated pt. And is putting an order in for a picc.

## 2017-04-13 NOTE — PROGRESS NOTES
PICC site and dressing check 24 hours post insertion :     PICC line site and dressing clean, dry and intact. No complications noted.     Stormy Wei RN  Vascular Access Team

## 2017-04-13 NOTE — INTERDISCIPLINARY ROUNDS
Interdisciplinary team rounds were held with the following team members:Case Management, Diabetes Treatment Specialist, Nursing, Nutrition, Occupational Therapy, Physical Therapy,Pharmacy, Physician and Respiratory Therapy. Plan of care discussed. See clinical pathway and/or care plan for interventions and desired outcomes.     Goals: transition off of insulin gtt

## 2017-04-13 NOTE — DIABETES MGMT
DTC Progress Note    Recommendations/ Comments: Pt discussed with rounding team and Dr. Macy Lewis. Plan to transition from insulin gtt to home regimen of NPH 15units Q12hrs. Also will add humalog 3units ac tid for prandial insulin and humalog high sensitivity correctional insulin, pt to begin DM diet. DTC will f/u with pt. Chart reviewed on Luis Gardner during Multidisciplinary Rounds. A1c:   Lab Results   Component Value Date/Time    Hemoglobin A1c 14.6 04/12/2017 10:32 AM           Recent Glucose Results:   Lab Results   Component Value Date/Time     (H) 04/13/2017 04:10 AM     (H) 04/12/2017 11:59 PM     (H) 04/12/2017 08:51 PM    GLUCPOC 192 (H) 04/13/2017 10:20 AM    GLUCPOC 204 (H) 04/13/2017 09:23 AM    GLUCPOC 188 (H) 04/13/2017 08:07 AM        Lab Results   Component Value Date/Time    Creatinine 1.43 04/13/2017 04:10 AM       Active Orders   Diet    DIET DIABETIC WITH OPTIONS Consistent Carb 1500-1600kcal; Regular        PO intake: No data found. Will continue to follow as needed. Thank you.   KAYLEEN RomeN, RN, [address]

## 2017-04-13 NOTE — PROGRESS NOTES
1145: Report received, orders reviewed. 1215: Assessment completed and noted. VSS. Pt remains on Glucostabalizer and on insulin gtt. Anion gap closed. 1300: Insulin gtt off and transitioned to NPH and SS insulin per order. Pt is eating and drinking; tolerating well. 1430: Family visiting, VSS. Will monitor. 1600: Assessment completed and noted. VSS BG checked and treated per order. 1745: Sampson removed. Pt taken off monitor for medical bed transfer per order and patient request.    8257: Dr. Antonio Osborne paged and updated to patient condition. Verbal orders received.

## 2017-04-13 NOTE — PROGRESS NOTES
Care Management:    Admitted with Diabetic Ketoacidosis. She was started on a bicarb gtt and insulin gtt. She was here in [facility] ER last month with same diagnosis and was intubated that admission. She has been diabetic since age 7. She has a hx of Bipolar , DM , noncompliance and has tested positive for cocaine in the past.     She goes to [facility] and we spoke to them and they are happy to see her and have her insulin for her and can provide her Lamictal for her. [facility] Patient says she has sent in her Medicaid application to try and have her Medicaid reinstated. She says she has done a care card application . I left a care card application in the room. Chart reviewed and we will cont to follow for discharge needs.     Author Misty [facility] ac [phone]

## 2017-04-13 NOTE — PROGRESS NOTES
[facility] Consult Service Progress NOTE  Pulmonary, Critical Care, and Sleep Medicine    Name: Anahi Alexander MRN: [id number]   : 1990 Hospital: [facility]   Date: 2017  Admission Date: 2017     Chart and notes reviewed. Data reviewed. Pt seen on rounds earlier today. I have evaluated and examined the patient. Pt is acutely ill. Reviewed the evaluation and will assist in implementing the plan as outlined by Dr. Angela Horne and team    Anahi Alexander is a 23 y.o. female hx of IDDM type I, DKA, non-compliance, and seizures who presents via EMS to the ED for unresponsiveness. Per EMS, pt was found unresponsive at approximately 0800 this morning. EMS reported checking BS on scene which read as \"high\" and subsequently began administering IV fluids en route. Per records, pt was admitted to [facility] from 3/11 to 3/15 for DKA after pt presented to ED unresponsive and subsequently intubated. Additional Dx during admission included: lactic acidosis, acute respiratory failure, acute encephalopathy, SIRS, hypotension, YVROSE, and cocaine use. Pt was also admitted to hospital for same from  to . Of note, pt is on lamictal for seizures. Pt told staff last admission that she could not afford meds. Lamictal worked but was not taking. Pt does not go to the free clinic which will provide meds at no cost. No details of what happened since last admission. No evidence of sepsis. Pregnancy test negative. UDS negative.  Blood cultures sent    Hospital Day: 2        IMPRESSION:   · Acute encephalopathy/Unresponsiveness  · Diabetic Ketoacidosis in the setting of [id number]  · Acute renal failure POA-likely secondary to pre-renal due to dehydration and DKA  · Bipolar disorder-will need to continue lamictal when able to take PO intake    · Pseudohyponatremia  · Now hypernatremia  · Medical non compliance  · H/o substance abuse      Code Status: Full  Surrogate Decision Maker: Fletcher Simmons, [address] per facebook at [phone]    DVT Prophylaxis: heparin   GI Prophylaxis: not indicated    Baseline: independent  Pt is critically ill and at high risk of end organ dysfunction and deterioration      RECOMMENDATIONS/PLAN:   · Diet  · Transition to home regimen  · encourage pt to go to the free clinic   · diabetic education when able  · Will be available to assist in medical management while in the CCU pending disposition     Code: Prior          Vital Signs: Intake/Output: Intake/Output:   Visit Vitals    /77    Pulse 100    Temp 98.2 °F (36.8 °C)    Resp 13    Ht 5' 6\" (1.676 m)    Wt 56.6 kg (124 lb 12.5 oz)    SpO2 98%    BMI 20.14 kg/m2      O2 Device: Room air  O2 Flow Rate (L/min): 2 l/min  Temp (24hrs), Av.6 °F (37 °C), Min:97.7 °F (36.5 °C), Max:99.2 °F (37.3 °C)     Intake/Output Summary (Last 24 hours) at 17 1348  Last data filed at 17 1247   Gross per 24 hour   Intake          6352.71 ml   Output             1790 ml   Net          4562.71 ml    Last shift:       07 -  1900  In: 1057.1 [P.O.:500; I.V.:557.1]  Out: 325 [Urine:325]  Last 3 shifts:  1901 -  0700  In: 5295.7 [I.V.:5295.7]  Out: 0081 [Urine:1465]         Telemetry:    normal sinus rhythm    Physical Exam:    General: young tall lean WF in no respiratory distress and acyanotic,    HEENT: NCAT, no nasal flaring; dry   Neck: No abnormally enlarged lymph nodes.    Chest: normal   Lungs: decreased air exchange bilaterally; mild tachypnea   Heart: Regular rate and rhythm or S1S2 present   Abdomen: soft, non-tender, without masses or organomegaly   :    Extremity: negative, cyanosis, clubbing;    Neuro: alert   Psych: normal affect   Skin: Pallor;   Pulses: Bilateral, Radial, 2+ Normal upper and lower extremity pulses   Capillary refill: abnormal:  sluggish capillary refill, pale;      DATA:    MAR reviewed and pertinent medications noted or modified as needed    MEDS:   Current Facility-Administered Medications   Medication    dextrose 5% - 0.45% NaCl with KCl 10 mEq/L infusion    insulin NPH (NOVOLIN N, HUMULIN N) injection 15 Units    insulin lispro (HUMALOG) injection    glucose chewable tablet 16 g    dextrose (D50W) injection syrg 12.5-25 g    glucagon (GLUCAGEN) injection 1 mg    insulin lispro (HUMALOG) injection 3 Units    enoxaparin (LOVENOX) injection 30 mg    lamoTRIgine (LaMICtal) tablet 200 mg    sodium chloride (NS) flush 5-10 mL    sodium chloride (NS) flush 5-10 mL    insulin regular (NOVOLIN R, HUMULIN R) 100 Units in 0.9% sodium chloride 100 mL infusion    cefTRIAXone (ROCEPHIN) 1 g in 0.9% sodium chloride (MBP/ADV) 50 mL    mupirocin (BACTROBAN) 2 % ointment          Labs:  Recent Labs      04/13/17   0410  04/12/17   1032   WBC  11.6*  29.9*   HGB  9.9*  13.4   PLT  134*  230     Recent Labs      04/13/17   0410  04/12/17   2359  04/12/17   2051   04/12/17   1159  04/12/17   1032   NA  152*  149*  146*   < >   --   133*   K  3.6  3.5  3.7   < >   --   3.8   CL  117*  108  110*   < >   --   91*   CO2  24  30  21   < >   --   <5*   GLU  121*  395*  245*   < >   --   1050*   BUN  26*  25*  29*   < >   --   32*   CREA  1.43*  1.61*  1.42*   < >   --   2.30*   CA  8.0*  7.0*  7.2*   < >   --   8.0*   MG  2.7*  1.4*  1.7   < >   --    --    PHOS  1.6*   --    --    --    --   9.3*   LAC   --    --    --    --   1.7   --    ALB   --    --    --    --    --   2.9*   SGOT   --    --    --    --    --   61*   ALT   --    --    --    --    --   33    < > = values in this interval not displayed.      Recent Labs      04/13/17   0533  04/12/17   0929   PH  7.45  6.98*   PCO2  38  16*   PO2  87  134*   HCO3  25  4*     Recent Labs      04/13/17   0533  04/12/17   0929   PH  7.45  6.98*   PCO2  38  16*   PO2  87  134*   HCO3  25  4*     Recent Labs      04/12/17   1032   CPK  129   TROIQ  <0.04       No results found for: IRON, FE, TIBC, IBCT, PSAT, FERR    Lab Results   Component Value Date/Time    TSH 1.65 01/04/2017 07:10 PM        Lab Results   Component Value Date/Time     04/12/2017 10:32 AM    Troponin-I, Qt. <0.04 04/12/2017 10:32 AM        Lab Results   Component Value Date/Time    Culture result: NO GROWTH AFTER 19 HOURS 04/12/2017 11:59 AM    Culture result: NO GROWTH 6 DAYS 03/11/2017 12:51 PM    Culture result: NO GROWTH 6 DAYS 03/11/2017 12:51 PM       No results found for: TOXA1, RPR, HBCM, HBSAG, HAAB, HCAB, HCAB1, HAAT, G6PD, CRYAC, HIVGT, HIVR, HIV1, HIV12, HIVPC, HIVRPI    Lab Results   Component Value Date/Time     04/12/2017 10:32 AM       Lab Results   Component Value Date/Time    Color YELLOW/STRAW 04/12/2017 09:40 AM    Appearance CLEAR 04/12/2017 09:40 AM    Specific gravity 1.020 12/29/2012 02:00 PM    pH (UA) 5.0 04/12/2017 09:40 AM    Protein 30 04/12/2017 09:40 AM    Glucose >1000 04/12/2017 09:40 AM    Ketone 80 04/12/2017 09:40 AM    Bilirubin NEGATIVE  04/12/2017 09:40 AM    Blood SMALL 04/12/2017 09:40 AM    Urobilinogen 0.2 04/12/2017 09:40 AM    Nitrites NEGATIVE  04/12/2017 09:40 AM    Leukocyte Esterase NEGATIVE  04/12/2017 09:40 AM    WBC 0-4 04/12/2017 09:40 AM    RBC 0-5 04/12/2017 09:40 AM    Bacteria NEGATIVE  04/12/2017 09:40 AM       Imaging:  []                           I have personally reviewed the patients radiographs and reports:        Results from Hospital Encounter encounter on 04/12/17   XR CHEST PORT   Narrative EXAM:  XR CHEST PORT. INDICATION: Diabetic ketoacidosis with leukocytosis. COMPARISON: 3/13/2017. FINDINGS:   A portable AP radiograph of the chest was obtained at 1119 hours. The  endotracheal tube, nasogastric tube and right jugular catheter have been  removed. Lines and tubes: The patient is on a cardiac monitor. Lungs: There is mild atelectasis at the lung bases. The lungs are otherwise  clear. Pleura: There is no pneumothorax or pleural effusion.   Mediastinum: The cardiac and mediastinal contours and pulmonary vascularity are  normal.  Bones and soft tissues: The bones and soft tissues are grossly within normal  limits. Impression IMPRESSION: Bibasilar atelectasis. No definite focal or confluent airspace  disease.               Leonel Toribio MD

## 2017-04-13 NOTE — PROGRESS NOTES
Bedside and Verbal shift change report given to Lena RN (oncoming nurse) by Fanta Morrell RN (offgoing nurse).  Report included the following information SBAR, Kardex, MAR, Recent Results, Med Rec Status and Cardiac Rhythm nst

## 2017-04-13 NOTE — PROGRESS NOTES
5583  Received report and assumed care of patient from Erwin Mathias RN.  2000  Shift assessment completed. See doc flowsheet for details. Patient is alert, oriented x 4, ambulates independently. Ambulated to commode for first void since munoz was removed at 1745.    2038  Gluco stabilizer history printed and put on patient's chart. 2230  Patient complaining of heartburn/indigestion, paged hospitalist.  Honorio Bynum returned page. Received order for Tums. 0715  Bedside and Verbal shift change report given to [employer] (oncoming nurse) by Mateo Kim (offgoing nurse). Report included the following information SBAR, Kardex, ED Summary, Intake/Output, MAR, Recent Results and Cardiac Rhythm Not on telemetry due to medical orders.

## 2017-04-13 NOTE — PROGRESS NOTES
Hospitalist Progress Note    NAME: Ricki Mcrae   :  1990   MRN:  [id number]       Interim Hospital Summary: 23 y.o. female whom presented on 2017 with      Assessment / Plan:    DKA  Resolved  Diabetes    Off the insulin drip  On nph 15 units bid and lispro sliding scale  4 admissions in the alst year  Does not follow with endocrinologist  hba1c 14.6  -diabetic education  Encephalopathy resolved      SIRS not sure of infection  -unclear source again at this time  -UDS negative this time, alcohol level low  -UA neg, BC obtained  -cxr neg for evidence of infiltration     On empiric rocephin  Follow Blood Cx  All the SIRS criteria may be with dka and acidosis   monitor        Acute renal failure POA  -likely secondary to pre-renal due to dehydration and DKA     Cont IVF  -monitor bmp         Bipolar disorder  lamictal    Urinary retention  On munoz, need voiding trial            Code Status: Full  Surrogate Decision Maker: Maryanne Yan-parent per facebook at [phone]      DVT Prophylaxis: heparin   GI Prophylaxis: not indicated      Baseline: independent           Subjective:     Chief Complaint / Reason for Physician Visit  Doing ok    Discussed with RN events overnight. Review of Systems:  Symptom Y/N Comments  Symptom Y/N Comments   Fever/Chills n   Chest Pain n    Poor Appetite    Edema     Cough n   Abdominal Pain n    Sputum    Joint Pain     SOB/PARMAR    Pruritis/Rash     Nausea/vomit    Tolerating PT/OT     Diarrhea    Tolerating Diet     Constipation    Other       Could NOT obtain due to:      Objective:     VITALS:   Last 24hrs VS reviewed since prior progress note.  Most recent are:  Patient Vitals for the past 24 hrs:   Temp Pulse Resp BP SpO2   17 1400 - (!) 105 21 118/79 99 %   17 1300 - 100 13 125/77 98 %   17 1200 98.2 °F (36.8 °C) - - - -   17 1100 - (!) 101 20 100/69 100 %   17 1000 - 100 21 102/62 99 %   17 0900 - (!) 107 21 105/65 99 %   04/13/17 0800 - (!) 107 17 109/69 100 %   04/13/17 0730 98.4 °F (36.9 °C) 100 13 - 100 %   04/13/17 0700 - (!) 108 24 107/60 99 %   04/13/17 0600 - (!) 111 22 94/59 98 %   04/13/17 0500 - (!) 105 13 106/67 100 %   04/13/17 0400 99.2 °F (37.3 °C) (!) 107 24 95/54 99 %   04/13/17 0300 - (!) 113 19 101/46 99 %   04/13/17 0200 - (!) 115 11 105/64 100 %   04/13/17 0100 - (!) 123 20 106/61 100 %   04/13/17 0000 99 °F (37.2 °C) (!) 115 24 98/69 100 %   04/12/17 2300 - (!) 115 22 95/57 100 %   04/12/17 2200 - (!) 114 22 112/61 100 %   04/12/17 2100 - (!) 117 24 99/57 97 %   04/12/17 2000 99.2 °F (37.3 °C) (!) 116 21 90/51 97 %   04/12/17 1900 - (!) 117 23 102/53 96 %   04/12/17 1826 - (!) 122 20 109/61 97 %   04/12/17 1813 - (!) 121 23 - 96 %   04/12/17 1751 97.7 °F (36.5 °C) - - - -   04/12/17 1730 - (!) 126 23 112/61 96 %   04/12/17 1715 - (!) 130 23 108/63 95 %   04/12/17 1700 - (!) 128 23 98/60 96 %   04/12/17 1645 - (!) 126 23 99/61 96 %   04/12/17 1630 - (!) 127 22 108/70 97 %   04/12/17 1615 - (!) 120 21 110/70 97 %   04/12/17 1600 - (!) 123 28 111/62 96 %   04/12/17 1545 - (!) 118 25 110/71 97 %   04/12/17 1530 - (!) 123 21 109/69 98 %   04/12/17 1515 - (!) 121 29 101/65 95 %   04/12/17 1500 - (!) 123 27 106/65 98 %       Intake/Output Summary (Last 24 hours) at 04/13/17 1454  Last data filed at 04/13/17 1247   Gross per 24 hour   Intake          4302.71 ml   Output             1590 ml   Net          2712.71 ml        PHYSICAL EXAM:  General: WD, WN. Alert, cooperative, no acute distress    EENT:  EOMI. Anicteric sclerae. MMM  Resp:  CTA bilaterally, no wheezing or rales. No accessory muscle use  CV:  Regular  rhythm,  No edema  GI:  Soft, Non distended, Non tender.  +Bowel sounds  Neurologic:  Alert and oriented X 3, normal speech,   Psych:   Good insight. Not anxious nor agitated  Skin:  No rashes.   No jaundice    Reviewed most current lab test results and cultures  YES  Reviewed most current radiology test results   YES  Review and summation of old records today    NO  Reviewed patient's current orders and MAR    YES  PMH/SH reviewed - no change compared to H&P  ________________________________________________________________________  Care Plan discussed with:    Comments   Patient y    Family      RN y    Care Manager     Consultant                        Multidiciplinary team rounds were held today with , nursing, pharmacist and clinical coordinator. Patient's plan of care was discussed; medications were reviewed and discharge planning was addressed. ________________________________________________________________________  Total NON critical care TIME:  35  Minutes    Total CRITICAL CARE TIME Spent:   Minutes non procedure based      Comments   >50% of visit spent in counseling and coordination of care     ________________________________________________________________________  Melecio Gutierrez MD     Procedures: see electronic medical records for all procedures/Xrays and details which were not copied into this note but were reviewed prior to creation of Plan. LABS:  I reviewed today's most current labs and imaging studies. Pertinent labs include:  Recent Labs      04/13/17   0410  04/12/17   1032   WBC  11.6*  29.9*   HGB  9.9*  13.4   HCT  27.3*  41.3   PLT  134*  230     Recent Labs      04/13/17   0410  04/12/17   2359  04/12/17 2051 04/12/17   1032   NA  152*  149*  146*   < >  133*   K  3.6  3.5  3.7   < >  3.8   CL  117*  108  110*   < >  91*   CO2  24  30  21   < >  <5*   GLU  121*  395*  245*   < >  1050*   BUN  26*  25*  29*   < >  32*   CREA  1.43*  1.61*  1.42*   < >  2.30*   CA  8.0*  7.0*  7.2*   < >  8.0*   MG  2.7*  1.4*  1.7   < >   --    PHOS  1.6*   --    --    --   9.3*   ALB   --    --    --    --   2.9*   TBILI   --    --    --    --   0.5   SGOT   --    --    --    --   61*   ALT   --    --    --    --   33    < > = values in this interval not displayed.        Signed: Sammi Reynoso MD

## 2017-04-14 NOTE — ROUTINE PROCESS
Bedside and Verbal shift change report received from Сергей Mayers (offgoing nurse). Report included the following information SBAR, Kardex, ED Summary, Procedure Summary, Intake/Output, MAR, Accordion, Recent Results and Med Rec Status. Currently Medical overflow. She is resting with her eyes closed, no facial grimaces or respiratory distress.

## 2017-04-14 NOTE — DIABETES MGMT
DTC Progress Note    Recommendations/ Comments: Pt discussed with rounding team and Dr. Lashay Manzo. Plan to increase NPH to 18units Q12hrs. Chart reviewed on Ricki Mcrae during Multidisciplinary Rounds. Addendum:  Spoke with pt regarding admission. She reports that she has been stretching her insulin and only taking approx 10units of NPH instead of 15units. She has insulin and syringes available at the free clinic she att[facility], but has failed to go  her medications per . Provided pt with 10 day supply of syringes and information on products available at [facility]. Pt may have to purchase a bottle of NPH for $25 at [facility] if she cannot obtain her insulin from the free clinic before Wednesday. A1c:   Lab Results   Component Value Date/Time    Hemoglobin A1c 14.6 04/12/2017 10:32 AM           Recent Glucose Results:   Lab Results   Component Value Date/Time     (H) 04/14/2017 03:17 AM    GLUCPOC 171 (H) 04/14/2017 07:40 AM    GLUCPOC 254 (H) 04/13/2017 09:41 PM    GLUCPOC 234 (H) 04/13/2017 05:33 PM        Lab Results   Component Value Date/Time    Creatinine 0.94 04/14/2017 03:17 AM       Active Orders   Diet    DIET DIABETIC WITH OPTIONS Consistent Carb 1500-1600kcal; Regular        PO intake: No data found. Will continue to follow as needed. Thank you.   KAYLEEN PatelN, RN, [address]

## 2017-04-14 NOTE — PROGRESS NOTES
DISCHARGE SUMMARY from Nurse    The following personal items are in your possession at time of discharge:                              PATIENT INSTRUCTIONS:    After general anesthesia or intravenous sedation, for 24 hours or while taking prescription Narcotics:  · Limit your activities  · Do not drive and operate hazardous machinery  · Do not make important personal or business decisions  · Do  not drink alcoholic beverages  · If you have not urinated within 8 hours after discharge, please contact your surgeon on call. Report the following to your surgeon:  · Excessive pain, swelling, redness or odor of or around the surgical area  · Temperature over 100.5  · Nausea and vomiting lasting longer than 4 hours or if unable to take medications  · Any signs of decreased circulation or nerve impairment to extremity: change in color, persistent  numbness, tingling, coldness or increase pain  · Any questions        What to do at Home:  Recommended activity: Activity as tolerated    *  Please give a list of your current medications to your Primary Care Provider. *  Please update this list whenever your medications are discontinued, doses are      changed, or new medications (including over-the-counter products) are added. *  Please carry medication information at all times in case of emergency situations. These are general instructions for a healthy lifestyle:    No smoking/ No tobacco products/ Avoid exposure to second hand smoke    Surgeon General's Warning:  Quitting smoking now greatly reduces serious risk to your health.     Obesity, smoking, and sedentary lifestyle greatly increases your risk for illness    A healthy diet, regular physical exercise & weight monitoring are important for maintaining a healthy lifestyle    You may be retaining fluid if you have a history of heart failure or if you experience any of the following symptoms:  Weight gain of 3 pounds or more overnight or 5 pounds in a week, increased swelling in our hands or feet or shortness of breath while lying flat in bed. Please call your doctor as soon as you notice any of these symptoms; do not wait until your next office visit. Recognize signs and symptoms of STROKE:    F-face looks uneven    A-arms unable to move or move unevenly    S-speech slurred or non-existent    T-time-call 911 as soon as signs and symptoms begin-DO NOT go       Back to bed or wait to see if you get better-TIME IS BRAIN. Warning Signs of HEART ATTACK     Call 911 if you have these symptoms:   Chest discomfort. Most heart attacks involve discomfort in the center of the chest that lasts more than a few minutes, or that goes away and comes back. It can feel like uncomfortable pressure, squeezing, fullness, or pain.  Discomfort in other areas of the upper body. Symptoms can include pain or discomfort in one or both arms, the back, neck, jaw, or stomach.  Shortness of breath with or without chest discomfort.  Other signs may include breaking out in a cold sweat, nausea, or lightheadedness. Don't wait more than five minutes to call [address]! Fast action can save your life. Calling 911 is almost always the fastest way to get lifesaving treatment. Emergency Medical Services staff can begin treatment when they arrive  up to an hour sooner than if someone gets to the hospital by car. The discharge information has been reviewed with the patient and friend. The patient and friend verbalized understanding. Discharge medications reviewed with the patient and friend and appropriate educational materials and side effects teaching were provided. I have reviewed discharge instructions with the patient and friend. The patient and friend verbalized understanding. Ambulated off the unit with her room mate.

## 2017-04-14 NOTE — DISCHARGE INSTRUCTIONS
Diabetes Sick-Day Plan: Care Instructions  Your Care Instructions  If you have diabetes, many other illnesses can make your blood sugar go up. This can be dangerous. When you are sick with the flu or another illness, your body releases hormones to fight infection. These hormones raise blood sugar levels and make it hard for insulin or other medicines to lower your blood sugar. Work with your doctor to make a plan for what to do on days when you are sick. Follow-up care is a key part of your treatment and safety. Be sure to make and go to all appointments, and call your doctor if you are having problems. It's also a good idea to know your test results and keep a list of the medicines you take. How can you care for yourself at home? · Work with your doctor to write up a sick-day plan for what to do on days when you are sick. Your blood sugar can go up or down, depending on your illness and whether you can keep food down. Call your doctor when you are sick, to see if you need to adjust your pills or insulin. · Write down the diabetes medicines you have been taking and whether you have changed the dose based on your sick-day plan. Have this information ready when you call your doctor. · Eat your normal types and amounts of food. Drink extra fluids, such as water, broth, and fruit juice, to prevent dehydration. ¨ If your blood sugar level is higher than the blood sugar level your doctor recommends (for example, above 240 milligrams per deciliter [mg/dL]), drink extra liquids that do not contain sugar, such as water or sugar-free cola. ¨ If you cannot eat your usual foods, drink extra liquids, such as soup, sports drinks, or milk. You may also eat food that is gentle on the stomach, such as crackers, gelatin dessert, or applesauce. Try to eat or drink 50 grams of carbohydrate every 3 to 4 hours.  For example, 6 saltine crackers, 1 cup (8 ounces) of milk, and ½ cup (4 ounces) of orange juice each contain about 15 grams of carbohydrate. · Check your blood sugar at least every 3 to 4 hours. If it goes up fast, check it more often. And check it even through the night. Take insulin if your doctor told you to do so. If you and your doctor did not have a sick-day plan for taking extra insulin, call him or her for advice. · If you take insulin, check your urine or blood for ketones. This is especially important if your blood sugar is high. · Do not take any over-the-counter medicines, such as pain relievers, decongestants, or herbal products or other natural medicines, without talking with your doctor first.  · Do not drive. If you need to see your doctor or go anywhere else, ask a family member or friend to drive you. When should you call for help? Call 911 anytime you think you may need emergency care. For example, call if:  · You passed out (lost consciousness), or you suddenly become very sleepy or confused. (You may have very low blood sugar.)  · You have symptoms of high blood sugar, such as:  ¨ Blurred vision. ¨ Trouble staying awake or being woken up. ¨ Fast, deep breathing. ¨ Breath that smells fruity. ¨ Belly pain, not feeling hungry, and vomiting. ¨ Feeling confused. Call your doctor now or seek immediate medical care if:  · You are sick and cannot control your blood sugar. · You have been vomiting or have had diarrhea for more than 6 hours. · Your blood sugar stays higher than the level your doctor has set for you. · You have symptoms of low blood sugar, such as:  ¨ Sweating. ¨ Feeling nervous, shaky, and weak. ¨ Extreme hunger and slight nausea. ¨ Dizziness and headache. ¨ Blurred vision. ¨ Confusion. Watch closely for changes in your health, and be sure to contact your doctor if:  · You have a hard time knowing when your blood sugar is low. · You have trouble keeping your blood sugar in the target range. · You often have problems controlling your blood sugar.   · You have symptoms of long-term diabetes problems, such as:  ¨ New vision changes. ¨ New pain, numbness, or tingling in your hands or feet. ¨ Skin problems. Where can you learn more? Go to http://uriel-reymundo.info/. Enter C858 in the search box to learn more about \"Diabetes Sick-Day Plan: Care Instructions. \"  Current as of: May 23, 2016  Content Version: 11.2  © 9339-8031 [employer]. Care instructions adapted under license by [employer] (which disclaims liability or warranty for this information). If you have questions about a medical condition or this instruction, always ask your healthcare professional. [place] [address] any warranty or liability for your use of this information. DISCHARGE SUMMARY from Nurse    The following personal items are in your possession at time of discharge:                                    PATIENT INSTRUCTIONS:    After general anesthesia or intravenous sedation, for 24 hours or while taking prescription Narcotics:  · Limit your activities  · Do not drive and operate hazardous machinery  · Do not make important personal or business decisions  · Do  not drink alcoholic beverages  · If you have not urinated within 8 hours after discharge, please contact your surgeon on call. Report the following to your surgeon:  · Excessive pain, swelling, redness or odor of or around the surgical area  · Temperature over 100.5  · Nausea and vomiting lasting longer than 4 hours or if unable to take medications  · Any signs of decreased circulation or nerve impairment to extremity: change in color, persistent  numbness, tingling, coldness or increase pain  · Any questions        What to do at Home:  Recommended activity: Activity as tolerated  *  Please give a list of your current medications to your Primary Care Provider.     *  Please update this list whenever your medications are discontinued, doses are      changed, or new medications (including over-the-counter products) are added. *  Please carry medication information at all times in case of emergency situations. These are general instructions for a healthy lifestyle:    No smoking/ No tobacco products/ Avoid exposure to second hand smoke    Surgeon General's Warning:  Quitting smoking now greatly reduces serious risk to your health. Obesity, smoking, and sedentary lifestyle greatly increases your risk for illness    A healthy diet, regular physical exercise & weight monitoring are important for maintaining a healthy lifestyle    You may be retaining fluid if you have a history of heart failure or if you experience any of the following symptoms:  Weight gain of 3 pounds or more overnight or 5 pounds in a week, increased swelling in our hands or feet or shortness of breath while lying flat in bed. Please call your doctor as soon as you notice any of these symptoms; do not wait until your next office visit. Recognize signs and symptoms of STROKE:    F-face looks uneven    A-arms unable to move or move unevenly    S-speech slurred or non-existent    T-time-call 911 as soon as signs and symptoms begin-DO NOT go       Back to bed or wait to see if you get better-TIME IS BRAIN. Warning Signs of HEART ATTACK     Call 911 if you have these symptoms:   Chest discomfort. Most heart attacks involve discomfort in the center of the chest that lasts more than a few minutes, or that goes away and comes back. It can feel like uncomfortable pressure, squeezing, fullness, or pain.  Discomfort in other areas of the upper body. Symptoms can include pain or discomfort in one or both arms, the back, neck, jaw, or stomach.  Shortness of breath with or without chest discomfort.  Other signs may include breaking out in a cold sweat, nausea, or lightheadedness. Don't wait more than five minutes to call 911 - MINUTES MATTER! Fast action can save your life.  Calling 911 is almost always the fastest way to get lifesaving treatment. Emergency Medical Services staff can begin treatment when they arrive -- up to an hour sooner than if someone gets to the hospital by car. The discharge information has been reviewed with the patient and friend. The patient and friend verbalized understanding. Discharge medications reviewed with the patient and friend and appropriate educational materials and side effects teaching were provided.

## 2017-04-14 NOTE — INTERDISCIPLINARY ROUNDS
Interdisciplinary team rounds were held 4/14/2017 with the following team members:Care Management, Diabetes Treatment Specialist, Nursing, Nutrition, Pharmacy, Physical Therapy and Physician.     Plan of care discussed. See clinical pathway and/or care plan for interventions and desired outcomes.

## 2017-04-14 NOTE — PROGRESS NOTES
Pt is medically stable for dc home today. Pt seen by Diabetic RN - she was given syringes and has Humalog at home. Pt understands that most affordable NPH will be at [facility] and she will either purchase there or contact Lizette Valencia from [facility] the [facility] to see if she could obtain the insulin that the clinic has been holding for her (for 2 months). CM obtained updated phone number for pt - new number is [phone]. Clinic may have been trying to contact her at an old number. CM spoke w/ Gene Liter at [facility] who reported that in Feb when pt was interviewed, pt did not have full custody of her child so was not eligible to reinstate Medicaid. Pt was encouraged to make regular visits to Wed.  Night clinic at [facility] to obtain insulin and [address] [facility], [place]

## 2017-04-14 NOTE — PROGRESS NOTES
[facility] Consult Service Progress NOTE  Pulmonary, Critical Care, and Sleep Medicine    Name: Talon Dominguez MRN: [id number]   : 1990 Hospital: [facility]   Date: 2017  Admission Date: 2017     Chart and notes reviewed. Data reviewed. Pt seen on rounds earlier today. I have evaluated and examined the patient. Pt is acutely ill. Reviewed the evaluation and will assist in implementing the plan as outlined by Dr. Sherri Jimenez and team    Talon Dominguez is a 23 y.o. female hx of IDDM type I, DKA, non-compliance, and seizures who presents via EMS to the ED for unresponsiveness. Per EMS, pt was found unresponsive at approximately 0800 this morning. EMS reported checking BS on scene which read as \"high\" and subsequently began administering IV fluids en route. Per records, pt was admitted to [facility] from 3/11 to 3/15 for DKA after pt presented to ED unresponsive and subsequently intubated. Additional Dx during admission included: lactic acidosis, acute respiratory failure, acute encephalopathy, SIRS, hypotension, YVROSE, and cocaine use. Pt was also admitted to hospital for same from  to . Of note, pt is on lamictal for seizures. Pt told staff last admission that she could not afford meds. Lamictal worked but was not taking. Pt does not go to the free clinic which will provide meds at no cost. No details of what happened since last admission. No evidence of sepsis. Pregnancy test negative. UDS negative.  Blood cultures sent    Hospital Day: 3        IMPRESSION:   · Acute encephalopathy/Unresponsiveness  · Diabetic Ketoacidosis in the setting of [id number]  · Acute renal failure POA-likely secondary to pre-renal due to dehydration and DKA  · Bipolar disorder-will need to continue lamictal when able to take PO intake   · Hypokalemia  · hypophosphatemia   · Pseudohyponatremia  · Now hypernatremia  · Medical non compliance  · H/o substance abuse Code Status: Full  Surrogate Decision Maker: Adele Noel per facebook at [phone]    DVT Prophylaxis: heparin   GI Prophylaxis: not indicated    Baseline: independent  Pt is critically ill and at high risk of end organ dysfunction and deterioration      RECOMMENDATIONS/PLAN:   · Diet  · Discharge per attending   · encourage pt to go to the free clinic - only open Wednesday night  · diabetic education  · Will be available to assist in medical management while in the CCU pending disposition     Code: Prior          Vital Signs: Intake/Output: Intake/Output:   Visit Vitals    /80 (BP 1 Location: Left arm, BP Patient Position: At rest)    Pulse 88    Temp 98.5 °F (36.9 °C)    Resp 18    Ht 5' 6\" (1.676 m)    Wt 56.6 kg (124 lb 12.5 oz)    SpO2 100%    BMI 20.14 kg/m2      O2 Device: Room air  O2 Flow Rate (L/min): 2 l/min  Temp (24hrs), Av.3 °F (36.8 °C), Min:98 °F (36.7 °C), Max:98.5 °F (36.9 °C)     Intake/Output Summary (Last 24 hours) at 17 1025  Last data filed at 17 0400   Gross per 24 hour   Intake          1757.28 ml   Output             1550 ml   Net           207.28 ml    Last shift:         Last 3 shifts:  1901 -  0700  In: 2853.4 [P.O.:1250; I.V.:1603.4]  Out: 2360 [Urine:2360]         Telemetry:    normal sinus rhythm    Physical Exam:    General: young tall lean WF in no respiratory distress and acyanotic,    HEENT: NCAT, no nasal flaring; dry   Neck: No abnormally enlarged lymph nodes.    Chest: normal   Lungs: decreased air exchange bilaterally; mild tachypnea   Heart: Regular rate and rhythm or S1S2 present   Abdomen: soft, non-tender, without masses or organomegaly   :    Extremity: negative, cyanosis, clubbing;    Neuro: alert   Psych: normal affect   Skin: Pallor;   Pulses: Bilateral, Radial, 2+ Normal upper and lower extremity pulses   Capillary refill: abnormal:  sluggish capillary refill, pale;      DATA:    MAR reviewed and pertinent medications noted or modified as needed    MEDS:   Current Facility-Administered Medications   Medication    insulin NPH (NOVOLIN N, HUMULIN N) injection 15 Units    insulin lispro (HUMALOG) injection    glucose chewable tablet 16 g    dextrose (D50W) injection syrg 12.5-25 g    glucagon (GLUCAGEN) injection 1 mg    insulin lispro (HUMALOG) injection 3 Units    enoxaparin (LOVENOX) injection 30 mg    pantoprazole (PROTONIX) tablet 40 mg    calcium carbonate (TUMS) chewable tablet 400 mg [elemental]    lamoTRIgine (LaMICtal) tablet 200 mg    sodium chloride (NS) flush 5-10 mL    sodium chloride (NS) flush 5-10 mL    cefTRIAXone (ROCEPHIN) 1 g in 0.9% sodium chloride (MBP/ADV) 50 mL    mupirocin (BACTROBAN) 2 % ointment          Labs:  Recent Labs      04/14/17 0317 04/13/17   0410  04/12/17   1032   WBC  8.1  11.6*  29.9*   HGB  8.7*  9.9*  13.4   PLT  100*  134*  230     Recent Labs      04/14/17   0317 04/13/17   0410  04/12/17   2359   04/12/17   1159  04/12/17   1032   NA  142  152*  149*   < >   --   133*   K  3.0*  3.6  3.5   < >   --   3.8   CL  106  117*  108   < >   --   91*   CO2  23  24  30   < >   --   <5*   GLU  204*  121*  395*   < >   --   1050*   BUN  13  26*  25*   < >   --   32*   CREA  0.94  1.43*  1.61*   < >   --   2.30*   CA  7.9*  8.0*  7.0*   < >   --   8.0*   MG  2.1  2.7*  1.4*   < >   --    --    PHOS  1.5*  1.6*   --    --    --   9.3*   LAC   --    --    --    --   1.7   --    ALB   --    --    --    --    --   2.9*   SGOT   --    --    --    --    --   61*   ALT   --    --    --    --    --   33    < > = values in this interval not displayed.      Recent Labs      04/13/17   0533  04/12/17   0929   PH  7.45  6.98*   PCO2  38  16*   PO2  87  134*   HCO3  25  4*     Recent Labs      04/13/17   0533  04/12/17   0929   PH  7.45  6.98*   PCO2  38  16*   PO2  87  134*   HCO3  25  4*     Recent Labs      04/12/17   1032   CPK  129   TROIQ  <0.04       No results found for: IRON, FE, TIBC, IBCT, PSAT, FERR    Lab Results   Component Value Date/Time    TSH 1.65 01/04/2017 07:10 PM        Lab Results   Component Value Date/Time     04/12/2017 10:32 AM    Troponin-I, Qt. <0.04 04/12/2017 10:32 AM        Lab Results   Component Value Date/Time    Culture result: NO GROWTH 2 DAYS 04/12/2017 11:59 AM    Culture result: NO GROWTH 6 DAYS 03/11/2017 12:51 PM    Culture result: NO GROWTH 6 DAYS 03/11/2017 12:51 PM       No results found for: TOXA1, RPR, HBCM, HBSAG, HAAB, HCAB, HCAB1, HAAT, G6PD, CRYAC, HIVGT, HIVR, HIV1, HIV12, HIVPC, HIVRPI    Lab Results   Component Value Date/Time     04/12/2017 10:32 AM       Lab Results   Component Value Date/Time    Color YELLOW/STRAW 04/12/2017 09:40 AM    Appearance CLEAR 04/12/2017 09:40 AM    Specific gravity 1.020 12/29/2012 02:00 PM    pH (UA) 5.0 04/12/2017 09:40 AM    Protein 30 04/12/2017 09:40 AM    Glucose >1000 04/12/2017 09:40 AM    Ketone 80 04/12/2017 09:40 AM    Bilirubin NEGATIVE  04/12/2017 09:40 AM    Blood SMALL 04/12/2017 09:40 AM    Urobilinogen 0.2 04/12/2017 09:40 AM    Nitrites NEGATIVE  04/12/2017 09:40 AM    Leukocyte Esterase NEGATIVE  04/12/2017 09:40 AM    WBC 0-4 04/12/2017 09:40 AM    RBC 0-5 04/12/2017 09:40 AM    Bacteria NEGATIVE  04/12/2017 09:40 AM       Imaging:  []                           I have personally reviewed the patients radiographs and reports:        Results from Hospital Encounter encounter on 04/12/17   XR CHEST PORT   Narrative EXAM:  XR CHEST PORT. INDICATION: Diabetic ketoacidosis with leukocytosis. COMPARISON: 3/13/2017. FINDINGS:   A portable AP radiograph of the chest was obtained at 1119 hours. The  endotracheal tube, nasogastric tube and right jugular catheter have been  removed. Lines and tubes: The patient is on a cardiac monitor. Lungs: There is mild atelectasis at the lung bases. The lungs are otherwise  clear. Pleura:  There is no pneumothorax or pleural effusion. Mediastinum: The cardiac and mediastinal contours and pulmonary vascularity are  normal.  Bones and soft tissues: The bones and soft tissues are grossly within normal  limits. Impression IMPRESSION: Bibasilar atelectasis. No definite focal or confluent airspace  disease.               Lincoln Lezama MD

## 2017-05-20 PROBLEM — E11.10 DKA (DIABETIC KETOACIDOSES): Status: ACTIVE | Noted: 2017-01-01

## 2017-05-20 NOTE — IP AVS SNAPSHOT
Summary of Care Report The Summary of Care report has been created to help improve care coordination. Users with access to [employer] or [employer] [address] (Web-based application) may access additional patient information including the Discharge Summary. If you are not currently a [address] user and need more information, please call the number listed below in the [facility] section and ask to be connected with Medical Records. Facility Information Name Address Phone [address] [address] [phone] Patient Information Patient Name Sex  Elsa Johns ([id number]) Female 1990 Discharge Information Admitting Provider Service Area Unit Ac Garcia MD / [phone] 3 Renal Medicine / [phone] Discharge Provider Discharge Date/Time Discharge Disposition Destination (none) 2017 Midday (Pending) AHR (none) Patient Language Language ENGLISH [13] Hospital Problems as of 2017  Reviewed: 2017  6:38 PM by Jagjit Flowers MD  
  
  
  
 Class Noted - Resolved Last Modified POA Active Problems DKA (diabetic ketoacidoses) ([facility]  2017 - Present 2017 by Ac Garcia MD Unknown Entered by Ac Garcia MD  
  
Non-Hospital Problems as of 2017  Reviewed: 2017  6:38 PM by Jagjit Flowers MD  
  
  
  
 Class Noted - Resolved Last Modified Active Problems DKA, type 1, not at goal [facility])  Unknown - Present 2017 by Jagjit Flowers MD  
  Entered by Colonel William LPN Overview Addendum 2015  8:17 PM by Katharina Silva MD  
   Type 1 DM diagnosed at age 7, on insulin pump since age 9   Bipolar disorder [facility])  Unknown - Present 2017 by Jagjit Flowers MD  
  Entered by Colonel William LPN  
 Partial epilepsy with impairment of consciousness ([facility] [address]  2/12/2015 - Present 2/21/2017 by Jagjit Flowers MD  
  Entered by Melissa Lewis MD  
  Seizure disorder [facility])  12/25/2015 - Present 2/21/2017 by Jagjit Flowers MD  
  Entered by Katharina Silva MD  
  Anxiety disorder  12/25/2015 - Present 2/21/2017 by Jagjit Flowers MD  
  Entered by Katharina Silva MD  
  OCD (obsessive compulsive disorder)  12/25/2015 - Present 2/21/2017 by Jagjit Flowers MD  
  Entered by Katharina Silva MD  
  Mixed hyperlipidemia  8/3/2016 - Present 2/21/2017 by Jagjit Flowers MD  
  Entered by Katharina Silva MD  
  Insulin pump in place  8/3/2016 - Present 2/21/2017 by Jagjit Flowers MD  
  Entered by Katharina Silva MD  
  ARF (acute renal failure) ([facility] [address]  2/21/2017 - Present 2/21/2017 by Jagjit Flowers MD  
  Entered by Jagjit Flowers MD  
  Dehydration  2/21/2017 - Present 2/21/2017 by Jagjit Flowers MD  
  Entered by Jagjit Flowers MD  
  Acidosis, metabolic  9/43/7802 - Present 2/21/2017 by Jagjit Flowers MD  
  Entered by Jagjit Flowers MD  
  Leukocytosis  2/21/2017 - Present 2/21/2017 by Jagjit Flowers MD  
  Entered by Jagjit Flowers MD  
  Acute encephalopathy  2/21/2017 - Present 2/21/2017 by Jagjit Flowers MD  
  Entered by Jagjit Flowers MD  
  Lactic acidemia  2/21/2017 - Present 2/21/2017 by Jagjit Flowers MD  
  Entered by Jagjit Flowers MD  
  Sinus tachycardia  2/21/2017 - Present 2/21/2017 by Jagjit Flowers MD  
  Entered by Jagjit Flowers MD  
  SIRS (systemic inflammatory response syndrome) ([facility] [address]  2/21/2017 - Present 2/21/2017 by Jagjit Flowers MD  
  Entered by Jagjit Flowers MD  
  DKA, type 1 ([facility] [address]  3/11/2017 - Present 4/12/2017 by Jam Friend MD  
  Entered by Marco Erazo MD  
  Acute renal failure ([facility]) ([facility]  4/12/2017 - Present 4/12/2017 by Jam Friend MD  
  Entered by Jam Friend MD  
  
 You are allergic to the following Allergen Reactions Other Medication Rash Tegaderm Sulfa (Sulfonamide Antibiotics) Hives Current Discharge Medication List  
  
CONTINUE these medications which have NOT CHANGED Dose & Instructions Dispensing Information Comments HumaLOG 100 unit/mL injection Generic drug:  insulin lispro  
 by SubCUTAneous route. 1 unit per 30 carbs Refills:  0  
   
 insulin  unit/mL injection Commonly known as:  Tay Bakes Dose:  18 Units 18 Units by SubCUTAneous route Before breakfast and dinner. Quantity:  3 Vial  
Refills:  2  
   
 insulin syringe-needle U-100 0.3 mL 31 gauge x 15/64\" Syrg Dose:  1 Each  
1 Each by SubCUTAneous route five (5) times daily. Follow-up needed for refills Quantity:  150 Pen Needle Refills:  0  
   
 lamoTRIgine 200 mg tablet Commonly known as: LaMICtal  
 Dose:  200 mg Take 1 Tab by mouth two (2) times a day. Quantity:  60 Tab Refills:  1 Current Immunizations Name Date Influenza Vaccine 2017, 2012 Pneumococcal Vaccine (Unspecified Type) 2008 Tdap 2016 Follow-up Information Follow up With Details Comments Contact Info Provider Unknown In 1 week  Patient not available to ask Discharge Instructions Patient Discharge Instructions Simon Castillo / [id number] : 1990 Admitted 2017 Discharged: 2017 DISCHARGE DIAGNOSIS:  
 
 
Uncontrolled Diabetes Take Home Medications General drug facts If you have a very bad allergy, wear an allergy ID at all times. It is important that you take the medication exactly as they are prescribed. Keep your medication in the bottles provided by the pharmacist. 
Keep a list of all your drugs (prescription, natural products, vitamins, OTC) with you. Give this list to your doctor. Do not take other medications without consulting your doctor. Do not share your drugs with others and do not take anyone else's drugs. Keep all drugs out of the reach of children and pets. Most drugs may be thrown away in household trash after mixing with coffee grounds or allen litter and sealing in a plastic bag. Keep a list Call your doctor for help with any side effects. If in the [place] you may also call the FDA at 5-451-XHY-2130 Talk with the doctor before starting any new drug, including OTC, natural products, or vitamins. What to do at [facility] 1. Recommended diet:diabetic diet , eat regular meals 2. Recommended activity: Activity as tolerated 3. If you experience any of the following symptoms then please call your primary care physician or return to the emergency room if you cannot get hold of your doctor: fever/chills/seizure 4. It is important that you take the medication exactly as they are prescribed. 5.Bring these papers with you to your follow up appointments. The papers will help your doctors be sure to continue the care plan from the hospital. 
 
 
Follow-up with:  
PCP: PROVIDER UNKNOWN Follow-up Information Follow up With Details Comments Contact Info Provider Unknown In 1 week  Patient not available to ask Please call for your own appointment Information obtained by : 
I understand that if any problems occur once I am at home I am to contact my physician. I understand and acknowledge receipt of the instructions indicated above. Physician's or R.N.'s Signature                                                                  Date/Time Patient or Representative Signature                                                          Date/Time Chart Review Routing History Recipient Method Report Sent By Crissie Sever Josette Cos, NP Fax: [phone] Phone: [phone] Fax Provider Comm Report Sharri Hanna MD [[id number]] 2011 10:33 PM 2011 Pollyann Apgar, MD  
Fax: [phone] Phone: [phone] Fax Provider Comm Report Sharri Hanna MD [[id number]] 2011 10:33 PM 2011 Neville Hobbs MD  
Phone: [phone] In Basket Note Review Sharri Hanna MD [[id number]] 2013  3:31 PM 2013 Dalia Merino MD  
Phone: [phone] In Basket Note Review Sharri Hanna MD [[id number]] 2013  2:25 PM 2013 Provider Unknown, MD  
Patient not available to ask [address] IP Auto Routed Notes MD Carl Clark 2015  8:24 PM 2015 Provider Unknown, MD  
Patient not available to ask [address] IP Auto Routed Notes Antoni Kathleen MD [[id number]] 2015 12:58 PM 2015 Rupal Carbajal MD  
Phone: [phone] In Basket IP Auto Routed Notes Jackie Odonnell MD [[id number]] 2017  7:01 PM 2017 Provider Unknown, MD  
Patient not available to ask [address] IP Auto Routed Notes Carlos Valencia MD [[id number]] 3/11/2017  3:25 PM 2017 Provider Unknown, MD  
Patient not available to ask [address] IP Auto Routed Notes Carlos Valencia MD [[id number]] 3/11/2017  3:34 PM 2017 Provider Unknown, MD  
Patient not available to ask [address] IP Auto Routed Notes Carlos Valencia MD [[id number]] 3/11/2017  4:15 PM 2017 Provider Unknown, MD  
Patient not available to ask [address] IP Auto Routed Notes Diana [place], MD Thang Luis 3/15/2017 10:01 PM 03/15/2017 Provider Unknown, MD  
Patient not available to ask [address] IP Auto Routed Notes Jennifer Marrero MD [[id number]] 4/12/2017  1:15 PM 04/12/2017 Provider Unknown, MD  
Patient not available to ask [address] IP Auto Routed Notes Jovanny Obregon MD [[id number]] 4/14/2017  2:18 PM 04/14/2017 Provider Unknown, MD  
Patient not available to ask [address] IP Auto Routed Notes MD Migue Rivera [facility] 5/21/2017 12:10 AM 05/21/2017 Provider Unknown, MD  
Patient not available to ask [address] IP Auto Routed Notes Mitali Balderas MD [[id number]] 5/22/2017 10:30 AM 05/22/2017

## 2017-05-20 NOTE — IP AVS SNAPSHOT
[address] [employer] 
[phone] Patient: Rick Marie MRN: [id number] :1990 You are allergic to the following Allergen Reactions Other Medication Rash Tegaderm Sulfa (Sulfonamide Antibiotics) Hives Recent Documentation Height Weight Breastfeeding? BMI OB Status Smoking Status 1.575 m 54.5 kg No 21.98 kg/m2 Having regular periods Current Every Day Smoker Emergency Contacts Name Discharge Info Relation Home Work Mobile Maryanne Yan  Parent [1] [phone] About your hospitalization You were admitted on:  May 20, 2017 You last received care in the:  [facility] 3 RENAL MEDICINE You were discharged on:  May 22, 2017 Unit phone number:  [phone] Why you were hospitalized Your primary diagnosis was:  Not on File Your diagnoses also included:  Dka (Diabetic Ketoacidoses) (Formerly [facility]) Providers Seen During Your Hospitalizations Provider Role Specialty Primary office phone Deepak Greenwood MD Attending Provider Emergency Medicine [phone] Suzan Lindsey MD Attending Provider Internal Medicine [phone] Your Primary Care Physician (PCP) Primary Care Physician Office Phone Office Fax UNKNOWN, PROVIDER ** None ** ** None ** Follow-up Information Follow up With Details Comments Contact Info Provider Unknown In 1 week  Patient not available to ask Current Discharge Medication List  
  
CONTINUE these medications which have NOT CHANGED Dose & Instructions Dispensing Information Comments Morning Noon Evening Bedtime HumaLOG 100 unit/mL injection Generic drug:  insulin lispro Your last dose was: Your next dose is:    
   
   
 by SubCUTAneous route. 1 unit per 30 carbs Refills:  0  
     
   
   
   
  
 insulin  unit/mL injection Commonly known as:  Em Gunn Your last dose was: Your next dose is:    
   
   
 Dose:  18 Units 18 Units by SubCUTAneous route Before breakfast and dinner. Quantity:  3 Vial  
Refills:  2  
     
   
   
   
  
 insulin syringe-needle U-100 0.3 mL 31 gauge x 15/64\" Syrg Your last dose was: Your next dose is:    
   
   
 Dose:  1 Each  
1 Each by SubCUTAneous route five (5) times daily. Follow-up needed for refills Quantity:  150 Pen Needle Refills:  0  
     
   
   
   
  
 lamoTRIgine 200 mg tablet Commonly known as: LaMICtal  
   
Your last dose was: Your next dose is:    
   
   
 Dose:  200 mg Take 1 Tab by mouth two (2) times a day. Quantity:  60 Tab Refills:  1 Discharge Instructions Patient Discharge Instructions Audra Mathew / [id number] : 1990 Admitted 2017 Discharged: 2017 DISCHARGE DIAGNOSIS:  
 
 
Uncontrolled Diabetes Take Home Medications General drug facts If you have a very bad allergy, wear an allergy ID at all times. It is important that you take the medication exactly as they are prescribed. Keep your medication in the bottles provided by the pharmacist. 
Keep a list of all your drugs (prescription, natural products, vitamins, OTC) with you. Give this list to your doctor. Do not take other medications without consulting your doctor. Do not share your drugs with others and do not take anyone else's drugs. Keep all drugs out of the reach of children and pets. Most drugs may be thrown away in household trash after mixing with coffee grounds or allen litter and sealing in a plastic bag. Keep a list Call your doctor for help with any side effects. If in the [place] you may also call the FDA at 1-590-XGP-7983 Talk with the doctor before starting any new drug, including OTC, natural products, or vitamins. What to do at [facility] 1. Recommended diet:diabetic diet , eat regular meals 2. Recommended activity: Activity as tolerated 3. If you experience any of the following symptoms then please call your primary care physician or return to the emergency room if you cannot get hold of your doctor: fever/chills/seizure 4. It is important that you take the medication exactly as they are prescribed. 5.Bring these papers with you to your follow up appointments. The papers will help your doctors be sure to continue the care plan from the hospital. 
 
 
Follow-up with:  
PCP: PROVIDER UNKNOWN Follow-up Information Follow up With Details Comments Contact Info Provider Unknown In 1 week  Patient not available to ask Please call for your own appointment Information obtained by : 
I understand that if any problems occur once I am at home I am to contact my physician. I understand and acknowledge receipt of the instructions indicated above. Physician's or R.N.'s Signature                                                                  Date/Time Patient or Representative Signature                                                          Date/Time Discharge Orders None [facility] Announcement We are excited to announce that we are making your provider's discharge notes available to you in [employer]. You will see these notes when they are completed and signed by the physician that discharged you from your recent hospital stay.   If you have any questions or concerns about any information you see in [facility], please call the [facility] Department where you were seen or reach out to your Primary Care Provider for more information about your plan of care. Introducing [facility]! [employer] introduces St[employer] Fix patient portal. Now you can access parts of your medical record, email your doctor's office, and request medication refills online. 1. In your internet browser, go to [email or web address] 2. Click on the First Time User? Click Here link in the Sign In box. You will see the New Member Sign Up page. 3. Enter your Stitch Fix Access Code exactly as it appears below. You will not need to use this code after youve completed the sign-up process. If you do not sign up before the expiration date, you must request a new code. · Stitch Fix Access Code: [email or web address] Expires: 5/22/2017  5:04 PM 
 
4. Enter the last four digits of your Social Security Number (xxxx) and Date of Birth (mm/dd/yyyy) as indicated and click Submit. You will be taken to the next sign-up page. 5. Create a Stitch Fix ID. This will be your Stitch Fix login ID and cannot be changed, so think of one that is secure and easy to remember. 6. Create a St[employer] Fix password. You can change your password at any time. 7. Enter your Password Reset Question and Answer. This can be used at a later time if you forget your password. 8. Enter your e-mail address. You will receive e-mail notification when new information is available in [address] 9. Click Sign Up. You can now view and download portions of your medical record. 10. Click the Download Summary menu link to download a portable copy of your medical information. If you have questions, please visit the Frequently Asked Questions section of the Stitch Fix website. Remember, Stitch Fix is NOT to be used for urgent needs. For medical emergencies, dial 911. Now available from your iPhone and Android! General Information Please provide this summary of care documentation to your next provider. Patient Signature:  ____________________________________________________________ Date:  ____________________________________________________________  
  
Dior Parisian Provider Signature:  ____________________________________________________________ Date:  ____________________________________________________________

## 2017-05-20 NOTE — IP AVS SNAPSHOT
Current Discharge Medication List  
  
CONTINUE these medications which have NOT CHANGED Dose & Instructions Dispensing Information Comments Morning Noon Evening Bedtime HumaLOG 100 unit/mL injection Generic drug:  insulin lispro Your last dose was: Your next dose is:    
   
   
 by SubCUTAneous route. 1 unit per 30 carbs Refills:  0  
     
   
   
   
  
 insulin  unit/mL injection Commonly known as:  Catherine Jones Your last dose was: Your next dose is:    
   
   
 Dose:  18 Units 18 Units by SubCUTAneous route Before breakfast and dinner. Quantity:  3 Vial  
Refills:  2  
     
   
   
   
  
 insulin syringe-needle U-100 0.3 mL 31 gauge x 15/64\" Syrg Your last dose was: Your next dose is:    
   
   
 Dose:  1 Each  
1 Each by SubCUTAneous route five (5) times daily. Follow-up needed for refills Quantity:  150 Pen Needle Refills:  0  
     
   
   
   
  
 lamoTRIgine 200 mg tablet Commonly known as: LaMICtal  
   
Your last dose was: Your next dose is:    
   
   
 Dose:  200 mg Take 1 Tab by mouth two (2) times a day. Quantity:  60 Tab Refills:  1

## 2017-05-21 NOTE — PROGRESS NOTES
TRANSFER - OUT REPORT:    Verbal report given to Margot CRUZ (name) on Lizette Hernandez  being transferred to [phone] ([facility]) for routine progression of care       Report consisted of patients Situation, Background, Assessment and   Recommendations(SBAR). Information from the following report(s) SBAR, Kardex, ED Summary, Intake/Output, MAR and Recent Results was reviewed with the receiving nurse. Lines:   Peripheral IV 05/20/17 Left Wrist (Active)   Site Assessment Clean, dry, & intact 5/21/2017 12:00 PM   Phlebitis Assessment 0 5/21/2017 12:00 PM   Infiltration Assessment 0 5/21/2017 12:00 PM   Dressing Status Clean, dry, & intact 5/21/2017 12:00 PM   Dressing Type Transparent 5/21/2017 12:00 PM   Hub Color/Line Status Pink; Infusing 5/21/2017 12:00 PM       Peripheral IV 05/21/17 Right Hand (Active)   Site Assessment Clean, dry, & intact 5/21/2017 12:00 PM   Phlebitis Assessment 0 5/21/2017 12:00 PM   Infiltration Assessment 0 5/21/2017 12:00 PM   Dressing Status Clean, dry, & intact 5/21/2017 12:00 PM   Dressing Type Transparent 5/21/2017 12:00 PM   Hub Color/Line Status Pink;Capped 5/21/2017 12:00 PM        Opportunity for questions and clarification was provided.       Patient transported with:   [employer]

## 2017-05-21 NOTE — ED PROVIDER NOTES
HPI Comments: Evelia Reagan is a 23 y.o. female with a pertinent PMHx of DM who presents by EMS to the ED for evaluation after a seizure. Per EMS, the pt was staying at a friend's house when she began seizing. Upon EMS arrival, the pt was laying in the friend's arms, and was bleeding from the mouth. The pt was still seizing when EMS arrived. Pt was given 5 mg versed during EMS transport. Pt's vital signs and respiration were determined to be normal, and her blood sugar was found to be high while in transport. Limited history due to AMS. Social hx: +(.5 ppd) Tobacco use, - EtOH use, - Illicit drug use    PCP: PROVIDER UNKNOWN    ROS and HPI limited due to the patient's nonverbal status. The history is provided by the EMS personnel. No  was used. Past Medical History:   Diagnosis Date    [facility] [address] [facility] 2017    Bipolar disorder ([facility] [address] [facility]     Contraception management     Gyn: Dr. Rafita Vasquez;  Nexplanon placed  and removed 7/6/15 because she wanted to conceive    Depression     Diabetes in pregnancy     Diabetes type I ([facility] [address]     Age 7; on insulin pump    HX OTHER MEDICAL     late pnc at 25 weeks    HX OTHER MEDICAL     polyhydramnios; previous pregnancy    Unspecified epilepsy without mention of intractable epilepsy     diabetic related       Past Surgical History:   Procedure Laterality Date     DELIVERY ONLY  , 14    2 cesareans         Family History:   Problem Relation Age of Onset    Hypertension Maternal Grandmother     High Cholesterol Maternal Grandmother     Hypertension Maternal Grandfather     High Cholesterol Maternal Grandfather     No Known Problems Mother     No Known Problems Father     Cancer Paternal Grandfather     Other Sister      stomach issues/gluten-lactose sensitive       Social History     Social History    Marital status: SINGLE     Spouse name: N/A    Number of children: N/A    Years of education: N/A     Occupational History    Not on file. Social History Main Topics    Smoking status: Current Every Day Smoker     Packs/day: 0.50     Years: 7.00     Types: Cigarettes    Smokeless tobacco: Never Used    Alcohol use No    Drug use: No    Sexual activity: Yes     Partners: Male     Birth control/ protection: Pill     Other Topics Concern    Not on file     Social History Narrative         ALLERGIES: Other medication and Sulfa (sulfonamide antibiotics)    Review of Systems   Unable to perform ROS: Mental status change          Patient Vitals for the past 12 hrs:   Temp Pulse Resp BP SpO2   05/21/17 0200 - (!) 107 21 117/78 100 %   05/21/17 0130 - 97 16 116/79 100 %   05/21/17 0100 - 90 17 115/81 100 %   05/21/17 0030 - 95 17 (!) 126/94 100 %   05/20/17 2246 - 96 16 - 98 %   05/20/17 2240 - (!) 103 18 - 97 %   05/20/17 2230 - (!) 103 15 115/81 97 %   05/20/17 2229 98.6 °F (37 °C) (!) 103 16 (!) 120/91 96 %   05/20/17 2218 - - - (!) 120/91 96 %         Physical Exam   Constitutional: She appears well-developed and well-nourished. HENT:   Head: Normocephalic and atraumatic. Eyes: Conjunctivae and EOM are normal. Pupils are equal, round, and reactive to light. Right eye exhibits no discharge. Left eye exhibits no discharge. No scleral icterus. Neck: Normal range of motion. Neck supple. No tracheal deviation present. Cardiovascular: Regular rhythm, normal heart sounds and intact distal pulses. Tachycardia present. Exam reveals no gallop and no friction rub. No murmur heard. Pulmonary/Chest: Effort normal and breath sounds normal. No respiratory distress. She has no wheezes. She has no rales. Abdominal: Soft. She exhibits no distension. There is no tenderness. Musculoskeletal: Normal range of motion. She exhibits no edema. All major joints have good ROM without tenderness   Lymphadenopathy:     She has no cervical adenopathy. Neurological: She is alert.    Awake but somnolent, unable to assess orientation, moving extremities equally, facial symmetry   Skin: Skin is warm and dry. No rash noted. Abrasion to lower lip, bite to the tongue, no active bleeding     Nursing note and vitals reviewed. MDM  Number of Diagnoses or Management Options  Diabetic ketoacidosis without coma associated with type 1 diabetes mellitus [facility]):   Seizure [facility]):   Diagnosis management comments:     Patient presents to ED after seizure like activity witnessed by EMS. Unclear if there was head injury associated with seizure activity. Differential includes seizure, DKA, hyperglycemia, dehydration, electrolyte abnormality, UTI, head injury/ICH  - CBC, CMP, Mg, VBG  - UA, UDS, UPT  - HCT  - IVF    Disposition: Labs consistent with DKA. Patient treated with IVF and insulin drip. Will admit for further management. Amount and/or Complexity of Data Reviewed  Clinical lab tests: ordered and reviewed  Tests in the radiology section of CPT®: ordered and reviewed  Obtain history from someone other than the patient: yes (EMS)  Review and summarize past medical records: yes  Discuss the patient with other providers: yes (Hospitalist)    Critical Care  Total time providing critical care: 30-74 minutes    ED Course       Procedures    CONSULT NOTE:   11:29 PM  Aubrey Hernandez MD spoke with Alina Osullivan MD   Specialty: Hospitalist  Discussed pt's hx, disposition, and available diagnostic and imaging results. Reviewed care plans. Consultant will evaluate pt for admission.  Pt will be admitted to the hospitalist.  Written by Gaby Ren ED Scribe, as dictated by Aubrey Hernandez MD.    CRITICAL CARE NOTE :    11:30 PM    IMPENDING DETERIORATION -Metabolic    ASSOCIATED RISK FACTORS - Metabolic changes and DKA    MANAGEMENT- Bedside Assessment and Supervision of Care    INTERPRETATION -  Labs, vital signs    INTERVENTIONS - IVF, insulin drip    CASE REVIEW - Hospitalist and Nursing    TREATMENT RESPONSE -Improved    PERFORMED BY - Self    NOTES:  I have spent 50 minutes of critical care time involved in lab review, consultations with specialist, family decision- making, bedside attention and documentation. During this entire length of time I was immediately available to the patient . Annalisa Meyers MD    LABORATORY TESTS:  Recent Results (from the past 12 hour(s))   GLUCOSE, POC    Collection Time: 05/20/17 10:25 PM   Result Value Ref Range    Glucose (POC) >600 (HH) 65 - 100 mg/dL    Performed by Fely Lee    CBC WITH AUTOMATED DIFF    Collection Time: 05/20/17 10:33 PM   Result Value Ref Range    WBC 4.6 3.6 - 11.0 K/uL    RBC 3.52 (L) 3.80 - 5.20 M/uL    HGB 11.2 (L) 11.5 - 16.0 g/dL    HCT 32.3 (L) 35.0 - 47.0 %    MCV 91.8 80.0 - 99.0 FL    MCH 31.8 26.0 - 34.0 PG    MCHC 34.7 30.0 - 36.5 g/dL    RDW 14.8 (H) 11.5 - 14.5 %    PLATELET 755 666 - 704 K/uL    NEUTROPHILS 63 %    LYMPHOCYTES 26 %    MONOCYTES 4 %    EOSINOPHILS 0 %    BASOPHILS 2 %    METAMYELOCYTES 2 %    MYELOCYTES 3 %    ABS. NEUTROPHILS 2.9 K/UL    ABS. LYMPHOCYTES 1.2 K/UL    ABS. MONOCYTES 0.2 K/UL    ABS. EOSINOPHILS 0.0 K/UL    ABS. BASOPHILS 0.1 K/UL    RBC COMMENTS NORMOCYTIC, NORMOCHROMIC      DF MANUAL     METABOLIC PANEL, COMPREHENSIVE    Collection Time: 05/20/17 10:33 PM   Result Value Ref Range    Sodium 131 (L) 136 - 145 mmol/L    Potassium 3.6 3.5 - 5.1 mmol/L    Chloride 90 (L) 97 - 108 mmol/L    CO2 19 (L) 21 - 32 mmol/L    Anion gap 22 (H) 5 - 15 mmol/L    Glucose 676 (HH) 65 - 100 mg/dL    BUN 34 (H) 6 - 20 MG/DL    Creatinine 1.05 (H) 0.55 - 1.02 MG/DL    BUN/Creatinine ratio 32 (H) 12 - 20      GFR est AA >60 >60 ml/min/1.73m2    GFR est non-AA >60 >60 ml/min/1.73m2    Calcium 8.8 8.5 - 10.1 MG/DL    Bilirubin, total 0.6 0.2 - 1.0 MG/DL    ALT (SGPT) 25 12 - 78 U/L    AST (SGOT) 22 15 - 37 U/L    Alk.  phosphatase 103 45 - 117 U/L    Protein, total 7.1 6.4 - 8.2 g/dL    Albumin 3.4 (L) 3.5 - 5.0 g/dL    Globulin 3.7 2.0 - 4.0 g/dL    A-G Ratio 0.9 (L) 1.1 - 2.2     MAGNESIUM    Collection Time: 05/20/17 10:33 PM   Result Value Ref Range    Magnesium 2.0 1.6 - 2.4 mg/dL   URINALYSIS W/ REFLEX CULTURE    Collection Time: 05/20/17 10:44 PM   Result Value Ref Range    Color YELLOW/STRAW      Appearance CLEAR CLEAR      Specific gravity 1.028 1.003 - 1.030      pH (UA) 5.0 5.0 - 8.0      Protein NEGATIVE  NEG mg/dL    Glucose >1000 (A) NEG mg/dL    Ketone 80 (A) NEG mg/dL    Bilirubin NEGATIVE  NEG      Blood NEGATIVE  NEG      Urobilinogen 0.2 0.2 - 1.0 EU/dL    Nitrites NEGATIVE  NEG      Leukocyte Esterase NEGATIVE  NEG      WBC 0-4 0 - 4 /hpf    RBC 0-5 0 - 5 /hpf    Epithelial cells FEW FEW /lpf    Bacteria NEGATIVE  NEG /hpf    UA:UC IF INDICATED CULTURE NOT INDICATED BY UA RESULT CNI      Hyaline cast 0-2 0 - 5 /lpf   DRUG SCREEN, URINE    Collection Time: 05/20/17 10:44 PM   Result Value Ref Range    AMPHETAMINE NEGATIVE  NEG      BARBITURATES NEGATIVE  NEG      BENZODIAZEPINE POSITIVE (A) NEG      COCAINE NEGATIVE  NEG      METHADONE NEGATIVE  NEG      OPIATES NEGATIVE  NEG      PCP(PHENCYCLIDINE) NEGATIVE  NEG      THC (TH-CANNABINOL) NEGATIVE  NEG      Drug screen comment (NOTE)    HCG URINE, QL    Collection Time: 05/20/17 10:44 PM   Result Value Ref Range    HCG urine, Ql. NEGATIVE  NEG     GLUCOSE, POC    Collection Time: 05/21/17 12:53 AM   Result Value Ref Range    Glucose (POC) 539 (H) 65 - 100 mg/dL    Performed by Robert Cross    GLUCOSE, POC    Collection Time: 05/21/17  1:57 AM   Result Value Ref Range    Glucose (POC) 414 (H) 65 - 100 mg/dL    Performed by Sunita Castro        IMAGING RESULTS:    CT Results  (Last 48 hours)               05/20/17 7708  CT HEAD WO CONT Final result    Impression:  IMPRESSION: No acute intracranial hemorrhage, mass or infarct. Narrative:  INDICATION: AMS, seizure.        Exam: Noncontrast CT of the brain is performed with 5 mm collimation. CT dose reduction was achieved with the use of the standardized protocol   tailored for this examination and automatic exposure control for dose   modulation. Direct comparison is made to prior CT dated March 2017. FINDINGS: There is no acute intracranial hemorrhage, mass, mass effect or   herniation. Ventricular system is normal. The gray-white matter differentiation   is well-preserved. The mastoid air cells are well pneumatized. The visualized   paranasal sinuses are normal.                   MEDICATIONS GIVEN:  Medications   sodium chloride (NS) flush 5-10 mL (not administered)   sodium chloride (NS) flush 5-10 mL (not administered)   insulin regular (NOVOLIN R, HUMULIN R) 100 Units in 0.9% sodium chloride 100 mL infusion (7.1 Units/hr IntraVENous Rate Change 5/21/17 0159)   glucose chewable tablet 16 g (not administered)   glucagon (GLUCAGEN) injection 1 mg (not administered)   potassium chloride 10 mEq in 100 ml IVPB (10 mEq IntraVENous New Bag 5/21/17 0028)   0.9% sodium chloride infusion (150 mL/hr IntraVENous New Bag 5/21/17 0029)   dextrose 10 % infusion 125-250 mL (not administered)   lamoTRIgine (LaMICtal) tablet 200 mg (not administered)   sodium chloride (NS) flush 5-10 mL (5 mL IntraVENous Given 5/21/17 0000)   sodium chloride (NS) flush 5-10 mL (not administered)   acetaminophen (TYLENOL) tablet 650 mg (not administered)   ondansetron (ZOFRAN) injection 4 mg (not administered)   bisacodyl (DULCOLAX) suppository 10 mg (not administered)   enoxaparin (LOVENOX) injection 30 mg (not administered)   LORazepam (ATIVAN) injection 2 mg (not administered)   sodium chloride 0.9 % bolus infusion 1,000 mL (1,000 mL IntraVENous New Bag 5/20/17 2227)   sodium chloride 0.9 % bolus infusion 1,000 mL (1,000 mL IntraVENous New Bag 5/20/17 0030)       IMPRESSION:  1. Diabetic ketoacidosis without coma associated with type 1 diabetes mellitus ([facility] [address]    2. Seizure ([email or web address]        PLAN:  1. Admit to Hospitalist    ADMIT NOTE:  2:27 AM  Patient is being admitted to the hospital by Dr. Mely Pfeiffer. The results of their tests and reasons for their admission have been discussed with them and/or available family. They convey agreement and understanding for the need to be admitted and for their admission diagnosis. Consultation has been made with the inpatient physician specialist for hospitalization. ATTESTATION:  This note is prepared by Forrest An, acting as Scribe for Mai Almazan MD.    Mai Almazan MD: The scribe's documentation has been prepared under my direction and personally reviewed by me in its entirety. I confirm that the note above accurately reflects all work, treatment, procedures, and medical decision making performed by me.

## 2017-05-21 NOTE — H&P
Hospitalist Admission Note    NAME: Evelia Reagan   :  1990   MRN:  [id number]     Date/Time:  2017 11:40 PM    Patient PCP: PROVIDER UNKNOWN  ________________________________________________________________________    My assessment of this patient's clinical condition and my plan of care is as follows. Assessment / Plan:  Recurrent DKA in setting of DM1:  4th admission in 4 months for same  - insulin gtt per protocol  - serial K, mag, phos  - IV fluids  - check HgA1c in AM  - CM consult - not sure that Pt is getting meds. Noncompliance is a significant issue for this patient contributing to her recurrent admissions - ?if there are any additional resources to assist her  Hyperglycemic seizure:  Pt says she has had seizures in the past due to high blood sugar  - CT head no acute intracranial hemorrhage, mass or infarct. - tox screen negative (h/o cocaine use)  - tx hyperglycemia as above  - ativan prn additional seizure activity  - will defer additional w/u unless Pt continues to have seizure after correction of hyperglcyemia  Bipolar disorder: con't home lamictal  Tobacco use d/o     Code status: Full  Prophylaxis: Lovenox  Recommended Disposition: Home w/Family        Subjective:   CHIEF COMPLAINT: seizure    HISTORY OF PRESENT ILLNESS:     Cristal Wong is a 23 y.o.  female who presents with above. History limited due to Pt fatigue after seizure. Pt was sent here today after being found having seizures - EMS documents active seizure on arrival with estimated time ~15 minutes. Pt says that she has been using her insulin, but cannot tell me where she is getting her prescriptions at this time. She denies recent fever, cough or URI sx. She denies pain. She denies nausea. She denies stool changes. Pt says she is not sure how we can help her to avoid these admissions for DKA. We were asked to admit for work up and evaluation of the above problems.      Past Medical History:   Diagnosis Date    ARF (acute renal failure) ([facility] [address] 2017    Bipolar disorder ([facility] [address]     Contraception management     Gyn: Dr. Dimitri Monte; Nexplanon placed  and removed 7/6/15 because she wanted to conceive    Depression     Diabetes in pregnancy     Diabetes type I ([facility] [address]     Age 7; on insulin pump    HX OTHER MEDICAL     late pnc at 25 weeks    HX OTHER MEDICAL     polyhydramnios; previous pregnancy    Unspecified epilepsy without mention of intractable epilepsy     diabetic related        Past Surgical History:   Procedure Laterality Date     DELIVERY ONLY  , 13    2 cesareans       Social History   Substance Use Topics    Smoking status: Current Every Day Smoker     Packs/day: 0.50     Years: 7.00     Types: Cigarettes    Smokeless tobacco: Never Used    Alcohol use No        Family History   Problem Relation Age of Onset    Hypertension Maternal Grandmother     High Cholesterol Maternal Grandmother     Hypertension Maternal Grandfather     High Cholesterol Maternal Grandfather     No Known Problems Mother     No Known Problems Father     Cancer Paternal Grandfather     Other Sister      stomach issues/gluten-lactose sensitive     Allergies   Allergen Reactions    Other Medication Rash     Tegaderm    Sulfa (Sulfonamide Antibiotics) Hives        Prior to Admission medications    Medication Sig Start Date End Date Taking? Authorizing Provider   insulin NPH (NOVOLIN N, HUMULIN N) 100 unit/mL injection 18 Units by SubCUTAneous route Before breakfast and dinner. 17   Tierney Hernandez MD   lamoTRIgine (LAMICTAL) 200 mg tablet Take 1 Tab by mouth two (2) times a day. 3/15/17   Anabelle Jordan MD   insulin lispro (HUMALOG) 100 unit/mL injection by SubCUTAneous route. 1 unit per 30 carbs    Historical Provider   insulin syringe-needle U-100 0.3 mL 31 gauge x 15/64\" syrg 1 Each by SubCUTAneous route five (5) times daily.  Follow-up needed for refills 9/27/16   Jas Burgos MD       REVIEW OF SYSTEMS:     I am not able to complete the review of systems because: The patient is intubated and sedated    The patient has altered mental status due to his acute medical problems    The patient has baseline aphasia from prior stroke(s)    The patient has baseline dementia and is not reliable historian    The patient is in acute medical distress and unable to provide information           Total of 12 systems reviewed as follows:       POSITIVE= underlined text  Negative = text not underlined  General:  fever, chills, sweats, generalized weakness, weight loss/gain,      loss of appetite   Eyes:    blurred vision, eye pain, loss of vision, double vision  ENT:    rhinorrhea, pharyngitis   Respiratory:   cough, sputum production, SOB, PARMAR, wheezing, pleuritic pain   Cardiology:   chest pain, palpitations, orthopnea, PND, edema, syncope   Gastrointestinal:  abdominal pain , N/V, diarrhea, dysphagia, constipation, bleeding   Genitourinary:  frequency, urgency, dysuria, hematuria, incontinence   Muskuloskeletal :  arthralgia, myalgia, back pain  Hematology:  easy bruising, nose or gum bleeding, lymphadenopathy   Dermatological: rash, ulceration, pruritis, color change / jaundice  Endocrine:   hot flashes or polydipsia   Neurological:  headache, dizziness, confusion, focal weakness, paresthesia,     Speech difficulties, memory loss, gait difficulty  Psychological: Feelings of anxiety, depression, agitation    Objective:   VITALS:    Visit Vitals    /81    Pulse 96    Temp 98.6 °F (37 °C)    Resp 16    Ht 5' 2\" (1.575 m)    Wt 56.2 kg (124 lb)    SpO2 98%    BMI 22.68 kg/m2       PHYSICAL EXAM:    General:    Alert, cooperative with simple questions and commands, no distress, appears stated age.      HEENT: Atraumatic, anicteric sclerae, pink conjunctivae     No oral ulcers, mucosa moist, throat clear, dentition fair  Neck:  Supple, symmetrical,  thyroid: non tender  Lungs:   Clear to auscultation bilaterally. No Wheezing or Rhonchi. No rales. Chest wall:  No tenderness  No Accessory muscle use. Heart:   Tachycardic, regular rhythm,  No  murmur   No edema  Abdomen:   Soft, non-tender. Not distended. Bowel sounds normal  Extremities: No cyanosis. No clubbing,      Skin turgor normal, Capillary refill normal, Radial dial pulse 2+  Skin:     Pale. Not Jaundiced  No rashes   Psych:  Flat affect. Poor insight. Not anxious or agitated. Neurologic: EOMs intact. No facial asymmetry. No aphasia or slurred speech. Pt fatigued and did not want to participate for strength exam, Sensation grossly intact. Alert and oriented X 4.     _______________________________________________________________________  Care Plan discussed with:    Comments   Patient x    Family      RN x    Care Manager                    Consultant:      _______________________________________________________________________  Expected  Disposition:   Home with Family x   HH/PT/OT/RN    SNF/LTC    MAY    ________________________________________________________________________  TOTAL TIME:   Minutes    Critical Care Provided    39 Minutes non procedure based ([id number])      Comments    x Reviewed previous records   >50% of visit spent in counseling and coordination of care x Discussion with patient and/or family and questions answered       ________________________________________________________________________  Signed: Fausto Melara MD    Procedures: see electronic medical records for all procedures/Xrays and details which were not copied into this note but were reviewed prior to creation of Plan.     LAB DATA REVIEWED:    Recent Results (from the past 24 hour(s))   GLUCOSE, POC    Collection Time: 05/20/17 10:25 PM   Result Value Ref Range    Glucose (POC) >600 (HH) 65 - 100 mg/dL    Performed by [facility]    CBC WITH AUTOMATED DIFF    Collection Time: 05/20/17 10:33 PM   Result Value Ref Range WBC 4.6 3.6 - 11.0 K/uL    RBC 3.52 (L) 3.80 - 5.20 M/uL    HGB 11.2 (L) 11.5 - 16.0 g/dL    HCT 32.3 (L) 35.0 - 47.0 %    MCV 91.8 80.0 - 99.0 FL    MCH 31.8 26.0 - 34.0 PG    MCHC 34.7 30.0 - 36.5 g/dL    RDW 14.8 (H) 11.5 - 14.5 %    PLATELET 034 602 - 333 K/uL    NEUTROPHILS 63 %    LYMPHOCYTES 26 %    MONOCYTES 4 %    EOSINOPHILS 0 %    BASOPHILS 2 %    METAMYELOCYTES 2 %    MYELOCYTES 3 %    ABS. NEUTROPHILS 2.9 K/UL    ABS. LYMPHOCYTES 1.2 K/UL    ABS. MONOCYTES 0.2 K/UL    ABS. EOSINOPHILS 0.0 K/UL    ABS. BASOPHILS 0.1 K/UL    RBC COMMENTS NORMOCYTIC, NORMOCHROMIC      DF MANUAL     METABOLIC PANEL, COMPREHENSIVE    Collection Time: 05/20/17 10:33 PM   Result Value Ref Range    Sodium 131 (L) 136 - 145 mmol/L    Potassium 3.6 3.5 - 5.1 mmol/L    Chloride 90 (L) 97 - 108 mmol/L    CO2 19 (L) 21 - 32 mmol/L    Anion gap 22 (H) 5 - 15 mmol/L    Glucose 676 (HH) 65 - 100 mg/dL    BUN 34 (H) 6 - 20 MG/DL    Creatinine 1.05 (H) 0.55 - 1.02 MG/DL    BUN/Creatinine ratio 32 (H) 12 - 20      GFR est AA >60 >60 ml/min/1.73m2    GFR est non-AA >60 >60 ml/min/1.73m2    Calcium 8.8 8.5 - 10.1 MG/DL    Bilirubin, total 0.6 0.2 - 1.0 MG/DL    ALT (SGPT) 25 12 - 78 U/L    AST (SGOT) 22 15 - 37 U/L    Alk.  phosphatase 103 45 - 117 U/L    Protein, total 7.1 6.4 - 8.2 g/dL    Albumin 3.4 (L) 3.5 - 5.0 g/dL    Globulin 3.7 2.0 - 4.0 g/dL    A-G Ratio 0.9 (L) 1.1 - 2.2     MAGNESIUM    Collection Time: 05/20/17 10:33 PM   Result Value Ref Range    Magnesium 2.0 1.6 - 2.4 mg/dL   URINALYSIS W/ REFLEX CULTURE    Collection Time: 05/20/17 10:44 PM   Result Value Ref Range    Color YELLOW/STRAW      Appearance CLEAR CLEAR      Specific gravity 1.028 1.003 - 1.030      pH (UA) 5.0 5.0 - 8.0      Protein NEGATIVE  NEG mg/dL    Glucose >1000 (A) NEG mg/dL    Ketone 80 (A) NEG mg/dL    Bilirubin NEGATIVE  NEG      Blood NEGATIVE  NEG      Urobilinogen 0.2 0.2 - 1.0 EU/dL    Nitrites NEGATIVE  NEG      Leukocyte Esterase NEGATIVE  NEG WBC 0-4 0 - 4 /hpf    RBC 0-5 0 - 5 /hpf    Epithelial cells FEW FEW /lpf    Bacteria NEGATIVE  NEG /hpf    UA:UC IF INDICATED CULTURE NOT INDICATED BY UA RESULT CNI      Hyaline cast 0-2 0 - 5 /lpf   DRUG SCREEN, URINE    Collection Time: 05/20/17 10:44 PM   Result Value Ref Range    AMPHETAMINE NEGATIVE  NEG      BARBITURATES NEGATIVE  NEG      BENZODIAZEPINE POSITIVE (A) NEG      COCAINE NEGATIVE  NEG      METHADONE NEGATIVE  NEG      OPIATES NEGATIVE  NEG      PCP(PHENCYCLIDINE) NEGATIVE  NEG      THC (TH-CANNABINOL) NEGATIVE  NEG      Drug screen comment (NOTE)    HCG URINE, QL    Collection Time: 05/20/17 10:44 PM   Result Value Ref Range    HCG urine, Ql. NEGATIVE  NEG

## 2017-05-21 NOTE — PROGRESS NOTES
Patient transferred via wheelchair to [phone] with [facility]. Voicing no complaints.

## 2017-05-21 NOTE — PROGRESS NOTES
0730  Patient received resting in bed with eyes closed. Remains lethargic, but arouses to verbal stimuli and is appropriate. Denies pain/discomfort. Sinus tach, on room air. No acute distress noted. Glucostabilizer program in use. Insulin drip on standby at this time. Patient states she did not take her insulin \"because I no longer have medicaid. \"  No seizure activity noted. 8509  Third dose of Potassium 40 meq po held at this time. Patient NPO and c/o heartburn after taking last dose. Will discuss with MD.     0346  Insulin drip re-started based on glucose. 5115  (9997) Chem 7 just sent. Great deal of difficulty sticking patient. K + from last blood draw 4.1  Updated Dr. Sabas Schafer that last dose was held. No further orders. 1321  Insulin drip placed on standby per glucostabilizer. Dr. Sabas Schafer on floor, updated on patient request to eat. MD will place orders to stop insulin drip and transition to subcu insulin. Okay to order diabetic diet, effective now. No further q4h lab work . 1423  Insulin given subcu to transition off drip per order. Patient accepting po intake without complaint of nausea.

## 2017-05-21 NOTE — ROUTINE PROCESS
2:09 AM  Report received from Kranthi Frye RN.    2:40 AM  Pt to floor, lethargic, delayed responses. Primary Nurse Naheed Coburn RN and Ramone Hogue RN performed a dual skin assessment on this patient No impairment noted. Tattoos and piercing's present. Insulin gtt infusing. Pt assisted to bedside commode. VSS, no complaints of pain, will continue to monitor. 4:08 AM  Spoke with Dr. Javi Park, orders received to add Dextrose 5% to fluids per DKA protocol. BG <250.     5:01 AM  Insulin gtt stopped, .     5:32 AM  Made Dr. Javi Park aware of lab results. Orders received for PO Klor-con 40 every one hour for 3 doses, 2 gm IV magnesium and add-on magnesium to morning BMP.    5:55 AM  Insulin gtt remains off, pt .     7:00 AM  Report given to Vara Phoenix, RN.

## 2017-05-21 NOTE — PROGRESS NOTES
Hospitalist Progress Note    NAME: Sonu Branch   :  1990   MRN:  [id number]       Interim Hospital Summary: 23 y.o. female whom presented on 2017 with      Assessment / Plan:  Recurrent DKA in setting of DM1  4th admission in 4 months for same. Likely not compliant with insulin   DKA resolved  Taking of gtt and will start on home regiment   Diabetic diet   hba1c 14.2   - CM consult - not sure that Pt is getting meds. Noncompliance is a significant issue for this patient contributing to her recurrent admissions - ?if there are any additional resources to assist her    Hyperglycemic seizure  No recurrent event   Head CT negative   ativan prn additional seizure activity    Bipolar disorder: con't home lamictal  Tobacco use             Code status: Full  Prophylaxis: Lovenox  Recommended Disposition: Home w/Family     Subjective:     Chief Complaint / Reason for Physician Visit: following DKA/ seizures   No recurrent seizures   Denies pain/nausea/vomiting/diarrhea       Discussed with RN events overnight. Review of Systems:  Symptom Y/N Comments  Symptom Y/N Comments   Fever/Chills n   Chest Pain n    Poor Appetite    Edema     Cough    Abdominal Pain n    Sputum    Joint Pain     SOB/PARMAR n   Pruritis/Rash     Nausea/vomit n   Tolerating PT/OT  Independent    Diarrhea n   Tolerating Diet y    Constipation n   Other       Could NOT obtain due to:      Objective:     VITALS:   Last 24hrs VS reviewed since prior progress note.  Most recent are:  Patient Vitals for the past 24 hrs:   Temp Pulse Resp BP SpO2   17 1300 - 98 18 97/51 97 %   17 1200 98.6 °F (37 °C) (!) 103 10 95/53 99 %   17 1100 - 96 18 94/51 99 %   17 1000 - 98 19 99/55 100 %   17 0900 - 96 20 101/53 99 %   17 0800 98.6 °F (37 °C) 97 18 99/52 98 %   17 0700 - 96 18 107/63 97 %   17 0600 - (!) 113 22 103/48 97 %   17 0500 - (!) 102 18 104/52 98 %   17 0400 - (!) 103 17 101/60 99 %   05/21/17 0312 - (!) 104 22 101/59 100 %   05/21/17 0240 98.6 °F (37 °C) (!) 101 16 120/76 100 %   05/21/17 0200 - (!) 107 21 117/78 100 %   05/21/17 0130 - 97 16 116/79 100 %   05/21/17 0100 - 90 17 115/81 100 %   05/21/17 0030 - 95 17 (!) 126/94 100 %   05/20/17 2246 - 96 16 - 98 %   05/20/17 2240 - (!) 103 18 - 97 %   05/20/17 2230 - (!) 103 15 115/81 97 %   05/20/17 2229 98.6 °F (37 °C) (!) 103 16 (!) 120/91 96 %   05/20/17 2218 - - - (!) 120/91 96 %       Intake/Output Summary (Last 24 hours) at 05/21/17 1327  Last data filed at 05/21/17 1300   Gross per 24 hour   Intake          1607.25 ml   Output                0 ml   Net          1607.25 ml        PHYSICAL EXAM:  General: WD, WN. Alert, cooperative, no acute distress    EENT:  EOMI. Anicteric sclerae. MMM  Resp:  CTA bilaterally, no wheezing or rales. No accessory muscle use  CV:  Regular  rhythm,  No edema  GI:  Soft, Non distended, Non tender.  +Bowel sounds  Neurologic:  Alert and oriented X 3, normal speech,   Psych:   Good insight. Not anxious nor agitated  Skin:  No rashes. No jaundice    Reviewed most current lab test results and cultures  YES  Reviewed most current radiology test results   YES  Review and summation of old records today    NO  Reviewed patient's current orders and MAR    YES  PMH/SH reviewed - no change compared to H&P  ________________________________________________________________________  Care Plan discussed with:    Comments   Patient y    Family      RN y    Care Manager     Consultant                        Multidiciplinary team rounds were held today with , nursing, pharmacist and clinical coordinator. Patient's plan of care was discussed; medications were reviewed and discharge planning was addressed.      ________________________________________________________________________  Total NON critical care TIME:  35  Minutes    Total CRITICAL CARE TIME Spent:   Minutes non procedure based      Comments   >50% of visit spent in counseling and coordination of care     ________________________________________________________________________  Ranjan Charles MD     Procedures: see electronic medical records for all procedures/Xrays and details which were not copied into this note but were reviewed prior to creation of Plan. LABS:  I reviewed today's most current labs and imaging studies.   Pertinent labs include:  Recent Labs      05/21/17   0359  05/20/17   2233   WBC  8.5  4.6   HGB  9.6*  11.2*   HCT  27.0*  32.3*   PLT  263  302     Recent Labs      05/21/17   0916  05/21/17 0359  05/20/17   2233   NA  140  144  131*   K  4.1  3.4*  3.6   CL  108  110*  90*   CO2  18*  22  19*   GLU  291*  121*  676*   BUN  17  22*  34*   CREA  0.64  0.79  1.05*   CA  7.5*  7.6*  8.8   MG   --   1.5*  2.0   PHOS   --   2.2*   --    ALB   --    --   3.4*   TBILI   --    --   0.6   SGOT   --    --   22   ALT   --    --   25       Signed: Ranjan Charles MD

## 2017-05-21 NOTE — PROGRESS NOTES
TRANSFER - IN REPORT:    Verbal report received from Amna(name) on Parker Farmer  being received from CCU(unit) for routine progression of care      Report consisted of patients Situation, Background, Assessment and   Recommendations(SBAR). Information from the following report(s) SBAR, Kardex, Intake/Output, MAR and Recent Results was reviewed with the receiving nurse. Opportunity for questions and clarification was provided. Assessment completed upon patients arrival to unit and care assumed.

## 2017-05-21 NOTE — ROUTINE PROCESS
TRANSFER - OUT REPORT:    Verbal report given to [address] on [facility]  being transferred to CCU for routine progression of care       Report consisted of patients Situation, Background, Assessment and   Recommendations(SBAR). Information from the following report(s) SBAR, ED Summary, MAR, Recent Results and Cardiac Rhythm NSR was reviewed with the receiving nurse. Lines:   Peripheral IV 05/20/17 Left Wrist (Active)   Site Assessment Clean, dry, & intact 5/20/2017 10:34 PM   Phlebitis Assessment 0 5/20/2017 10:34 PM   Infiltration Assessment 0 5/20/2017 10:34 PM   Dressing Status Clean, dry, & intact 5/20/2017 10:34 PM   Hub Color/Line Status Pink;Patent; Flushed 5/20/2017 10:34 PM       Peripheral IV 05/21/17 Right Hand (Active)   Site Assessment Clean, dry, & intact 5/21/2017 12:28 AM   Phlebitis Assessment 0 5/21/2017 12:28 AM   Infiltration Assessment 0 5/21/2017 12:28 AM   Dressing Status Clean, dry, & intact 5/21/2017 12:28 AM   Hub Color/Line Status Pink;Flushed;Patent 5/21/2017 12:28 AM        Opportunity for questions and clarification was provided.       Patient transported with:   Monitor  Registered Nurse  Tech

## 2017-05-21 NOTE — PROGRESS NOTES
Pharmacy Note - Pharmacy Automatic Dosing of Lovenox     Current Enoxaparin dose: 40 mg daily    Patient Wt = 56.2 kg    The recommended dose (0.5 mg/kg) for this patient is lovenox 30 mg daily for VTE prophylaxis. The dose of Lovenox was changed to 30 mg sc daily per [facility] approved protocol.       Thanks,  Aníbal Avila, PHARMD

## 2017-05-22 NOTE — PROGRESS NOTES
Discharge instructions reviewed with patient, opportunity for questions was provided. IV removed, patient tolerated well. Patient left the unit via wheelchair with belongings and discharge instructions. Patient has an appointment scheduled for May 24th at the [facility].

## 2017-05-22 NOTE — DISCHARGE SUMMARY
Hospitalist Discharge Summary     Patient ID:  Kim Manley  [id number]  23 y.o.  1990    PCP on record: PROVIDER UNKNOWN    Admit date: 5/20/2017  Discharge date and time: 5/22/2017      DISCHARGE DIAGNOSIS:    Recurrent DKA in setting of DM1  Hyperglycemic seizure  Bipolar disorder  Tobacco use   Code status: Full        CONSULTATIONS:  None    Excerpted HPI from H&P of Fausto Melara MD:    HISTORY OF PRESENT ILLNESS:   Yvan Fonseca is a 23 y.o.  female who presents with above. History limited due to Pt fatigue after seizure. Pt was sent here today after being found having seizures - EMS documents active seizure on arrival with estimated time ~15 minutes. Pt says that she has been using her insulin, but cannot tell me where she is getting her prescriptions at this time. She denies recent fever, cough or URI sx. She denies pain. She denies nausea. She denies stool changes. Pt says she is not sure how we can help her to avoid these admissions for DKA.      We were asked to admit for work up and evaluation of the above problems. ______________________________________________________________________  DISCHARGE SUMMARY/HOSPITAL COURSE:  for full details see H&P, daily progress notes, labs, consult notes. Recurrent DKA in setting of DM1  4th admission in 4 months for same. Likely not compliant with insulin. Pt admits to poor diet habits ( mostly eating saltines and juice at home). Admits to not taking insulin as scheduled. DKA resolved.  this am   On home regiment  Pt confirmed she has enought insulin supplies at home now   Diabetic diet   hba1c 14.2   CM consult - not sure that Pt is getting meds.  Noncompliance is a significant issue for this patient contributing to her recurrent admissions - ?if there are any additional resources to assist her     Hyperglycemic seizure  No recurrent event   Head CT negative   ativan prn additional seizure activity     Bipolar disorder: con't home lamictal  Tobacco use                Code status: Full  Prophylaxis: Lovenox  Recommended Disposition: Home w/Family later on today         _______________________________________________________________________  Patient seen and examined by me on discharge day. PHYSICAL EXAM:  General: WD, WN. Alert, cooperative, no acute distress    EENT:  EOMI. Anicteric sclerae. MMM  Resp:  CTA bilaterally, no wheezing or rales. No accessory muscle use  CV:  Regular rhythm,  No edema  GI:  Soft, Non distended, Non tender.  +Bowel sounds  Neurologic:  Alert and oriented X 3, normal speech,   Psych:   Good insight. Not anxious nor agitated  Skin:  No rashes. No jaundice  _______________________________________________________________________  DISCHARGE MEDICATIONS:   Current Discharge Medication List      CONTINUE these medications which have NOT CHANGED    Details   insulin NPH (NOVOLIN N, HUMULIN N) 100 unit/mL injection 18 Units by SubCUTAneous route Before breakfast and dinner. Qty: 3 Vial, Refills: 2      lamoTRIgine (LAMICTAL) 200 mg tablet Take 1 Tab by mouth two (2) times a day. Qty: 60 Tab, Refills: 1      insulin lispro (HUMALOG) 100 unit/mL injection by SubCUTAneous route. 1 unit per 30 carbs      insulin syringe-needle U-100 0.3 mL 31 gauge x 15/64\" syrg 1 Each by SubCUTAneous route five (5) times daily. Follow-up needed for refills  Qty: 150 Pen Needle, Refills: 0             My Recommended Diet, Activity, Wound Care, and follow-up labs are listed in the patient's Discharge Insturctions which I have personally completed and reviewed.     _______________________________________________________________________  DISPOSITION:    Home with Family: y   Home with HH/PT/OT/RN:    SNF/LTC:    MAY:    OTHER:        Condition at Discharge:  Stable  _______________________________________________________________________  Follow up with:   PCP : PROVIDER UNKNOWN  Follow-up Information     Follow up With Details Comments Contact Info    Provider Unknown In 1 week  Patient not available to ask                Total time in minutes spent coordinating this discharge (includes going over instructions, follow-up, prescriptions, and preparing report for sign off to her PCP) :  > 30  minutes    Signed:  Hallie Silva MD

## 2017-05-22 NOTE — PROGRESS NOTES
Hospitalist Progress Note    NAME: Audra Mathew   :  1990   MRN:  [id number]       Interim Hospital Summary: 23 y.o. female whom presented on 2017 with      Assessment / Plan:  Recurrent DKA in setting of DM1  4th admission in 4 months for same. Likely not compliant with insulin. Pt admits to poor diet habits ( mostly eating saltines and juice at home). Admits to not taking insulin as scheduled. DKA resolved.  this am   On home regiment  Pt confirmed she has enought insulin supplies at home now   Diabetic diet   hba1c 14.2   CM consult - not sure that Pt is getting meds. Noncompliance is a significant issue for this patient contributing to her recurrent admissions - ?if there are any additional resources to assist her    Hyperglycemic seizure  No recurrent event   Head CT negative   ativan prn additional seizure activity    Bipolar disorder: con't home lamictal  Tobacco use             Code status: Full  Prophylaxis: Lovenox  Recommended Disposition: Home w/Family later on today      Subjective:     Chief Complaint / Reason for Physician Visit: following DKA/ seizures   No recurrent seizures   Denies any new symptoms       Discussed with RN events overnight. Review of Systems:  Symptom Y/N Comments  Symptom Y/N Comments   Fever/Chills n   Chest Pain n    Poor Appetite    Edema     Cough    Abdominal Pain n    Sputum    Joint Pain     SOB/PARMAR n   Pruritis/Rash     Nausea/vomit n   Tolerating PT/OT  Independent    Diarrhea n   Tolerating Diet y    Constipation n   Other       Could NOT obtain due to:      Objective:     VITALS:   Last 24hrs VS reviewed since prior progress note.  Most recent are:  Patient Vitals for the past 24 hrs:   Temp Pulse Resp BP SpO2   17 0746 98 °F (36.7 °C) 78 18 107/66 98 %   17 0010 98.1 °F (36.7 °C) 86 18 107/62 99 %   17 1625 98.5 °F (36.9 °C) 100 16 99/64 100 %   17 1600 98.6 °F (37 °C) (!) 105 16 95/61 100 %   17 1500 - (!) 103 16 104/54 98 %   05/21/17 1400 - (!) 102 17 98/58 100 %   05/21/17 1300 - 98 18 97/51 97 %   05/21/17 1200 98.6 °F (37 °C) (!) 103 10 95/53 99 %   05/21/17 1100 - 96 18 94/51 99 %       Intake/Output Summary (Last 24 hours) at 05/22/17 1023  Last data filed at 05/22/17 5560   Gross per 24 hour   Intake          2099.71 ml   Output              200 ml   Net          1899.71 ml        PHYSICAL EXAM:  General: WD, WN. Alert, cooperative, no acute distress    EENT:  EOMI. Anicteric sclerae. MMM  Resp:  CTA bilaterally, no wheezing or rales. No accessory muscle use  CV:  Regular  rhythm,  No edema  GI:  Soft, Non distended, Non tender.  +Bowel sounds  Neurologic:  Alert and oriented X 3, normal speech,   Psych:   Good insight. Not anxious nor agitated  Skin:  No rashes. No jaundice    Reviewed most current lab test results and cultures  YES  Reviewed most current radiology test results   YES  Review and summation of old records today    NO  Reviewed patient's current orders and MAR    YES  PMH/ reviewed - no change compared to H&P  ________________________________________________________________________  Care Plan discussed with:    Comments   Patient y    Family      RN y    Care Manager     Consultant                        Multidiciplinary team rounds were held today with , nursing, pharmacist and clinical coordinator. Patient's plan of care was discussed; medications were reviewed and discharge planning was addressed.      ________________________________________________________________________  Total NON critical care TIME:  35  Minutes    Total CRITICAL CARE TIME Spent:   Minutes non procedure based      Comments   >50% of visit spent in counseling and coordination of care y Dc coordination    ________________________________________________________________________  Day Palacios, MD     Procedures: see electronic medical records for all procedures/Xrays and details which were not copied into this note but were reviewed prior to creation of Plan. LABS:  I reviewed today's most current labs and imaging studies.   Pertinent labs include:  Recent Labs      05/21/17 0359 05/20/17 2233   WBC  8.5  4.6   HGB  9.6*  11.2*   HCT  27.0*  32.3*   PLT  263  302     Recent Labs      05/22/17   0430  05/21/17   0916  05/21/17 0359 05/20/17 2233   NA  143  140  144  131*   K  3.6  4.1  3.4*  3.6   CL  109*  108  110*  90*   CO2  25  18*  22  19*   GLU  208*  291*  121*  676*   BUN  10  17  22*  34*   CREA  0.61  0.64  0.79  1.05*   CA  8.2*  7.5*  7.6*  8.8   MG  2.2   --   1.5*  2.0   PHOS  2.2*  2.5*  2.2*   --    ALB   --    --    --   3.4*   TBILI   --    --    --   0.6   SGOT   --    --    --   22   ALT   --    --    --   25       Signed: Suzan Lindsey MD

## 2017-05-22 NOTE — PROGRESS NOTES
Pt is a 22 y/o  female admitted with DKA. Pt resides with a roommate. She has been admitted Feb, Mar, and April. SW address pt in bed eating saltine crackers and if it was wise for her to be eating them. Pt stated she knows she is not to be eating them but she was hungry. SW inquired how to assist with her healthcare plan to avoid continued readmissions. Pt responded that she needs the supplies for her insulin pump and without Medicaid it would be \"thousands of dollars\" without insurance. SW inquired pt plan for insurance coverage. Pt stated she could go to [facility] and apply for [employer]. SW inquired if pt was employed and she said \"no\". Pt has no plan for her care. SW inquired if pt had medications at home and she stated she did. Pt plans to contine to receive her care and medications at [facility] [place] clinic. Pt possible discharge today. SW will continue to follow and assist with additional discharge needs. Care Management Interventions  PCP Verified by CM: Yes (Pt visits with the hospitals MD at the clinic)  Mode of Transport at Discharge: Other (see comment) (Pt family/friends with transport at discharge)  Current Support Network:  Other (Pt resides with a roommate)  Confirm Follow Up Transport: Family (Pt family/friends to transport at discharge possibly today)  Plan discussed with Pt/Family/Caregiver: Yes  Discharge Location  Discharge Placement: Home    ARIEL Whitt  Ext [phone]

## 2017-05-22 NOTE — PROGRESS NOTES
Shift change report given to Deepika Adler RN (oncoming nurse) by Irish Leal RN (offgoing nurse). Report included the following information SBAR, Kardex, Intake/Output, MAR and Recent Results. Zone Phone for oncoming shift:   n/a    Shift Summary: VS stable. No c/o pain. LDAs               Peripheral IV 05/20/17 Left Wrist (Active)   Site Assessment Clean, dry, & intact 5/21/2017  4:33 PM   Phlebitis Assessment 0 5/21/2017  4:33 PM   Infiltration Assessment 0 5/21/2017  4:33 PM   Dressing Status Clean, dry, & intact 5/21/2017  4:33 PM   Dressing Type Transparent 5/21/2017  4:33 PM   Hub Color/Line Status Pink; Infusing 5/21/2017  4:33 PM       Peripheral IV 05/21/17 Right Hand (Active)   Site Assessment Clean, dry, & intact 5/21/2017  4:33 PM   Phlebitis Assessment 0 5/21/2017  4:33 PM   Infiltration Assessment 0 5/21/2017  4:33 PM   Dressing Status Clean, dry, & intact 5/21/2017  4:33 PM   Dressing Type Transparent 5/21/2017  4:33 PM   Hub Color/Line Status Pink;Capped 5/21/2017  4:33 PM                        Intake & Output   Date 05/20/17 1900 - 05/21/17 0659 05/21/17 0700 - 05/22/17 0659   Shift 0763-9528 24 Hour Total 7163-2137 3903-8863 24 Hour Total   I  N  T  A  K  E   I.V.  (mL/kg/hr) 695.3 695.3 1233.4  (1.9)  1233.4      Insulin Volume 22.8 22.8 10.9  10.9      Volume (dextrose 5% and 0.9% NaCl infusion) .5  1092.5      Volume (0.45% sodium chloride infusion)   130  130      Volume (0.9% sodium chloride infusion) 377.5 377.5         Volume (dextrose 10 % infusion 125-250 mL)   0  0    Shift Total  (mL/kg) 695.3  (12.4) 695.3  (12.4) 1233.4  (22.6)  1233.4  (22.6)   O  U  T  P  U  T   Urine  (mL/kg/hr)   200  (0.3)  200      Urine Voided   200  200      Urine Occurrence(s) 1 x 1 x 1 x  1 x    Shift Total  (mL/kg)   200  (3.7)  200  (3.7)   .3 695.3 1033.4  1033.4   Weight (kg) 56.2 56.2 54.5 54.5 54.5      Last Bowel Movement     Glucose Checks [] N/A  [x] AC/HS  [] Q6  Concerns: Nutrition Active Orders   Diet    DIET DIABETIC CONSISTENT CARB Regular       Consults []PT  []OT  []Speech  []Case Management   Cardiac Monitoring [x]N/A []Yes Expires:

## 2017-05-22 NOTE — PROGRESS NOTES
Bedside and Verbal shift change report given to [address] [place] [address] RN(oncoming nurse) by Vira Bustamante RN (offgoing nurse). Report included the following information SBAR, Kardex, Intake/Output and MAR. Zone Phone for oncoming shift:       Shift Summary: Patient slept part of the shift, when awake continually ate saltine crackers and drank apple juice    LDAs               Peripheral IV 05/20/17 Left Wrist (Active)   Site Assessment Clean, dry, & intact 5/21/2017 11:20 PM   Phlebitis Assessment 0 5/21/2017 11:20 PM   Infiltration Assessment 0 5/21/2017 11:20 PM   Dressing Status Clean, dry, & intact 5/21/2017 11:20 PM   Dressing Type Transparent 5/21/2017 11:20 PM   Hub Color/Line Status Pink; Infusing 5/21/2017 11:20 PM       Peripheral IV 05/21/17 Right Hand (Active)   Site Assessment Clean, dry, & intact 5/21/2017 11:20 PM   Phlebitis Assessment 0 5/21/2017 11:20 PM   Infiltration Assessment 0 5/21/2017 11:20 PM   Dressing Status Clean, dry, & intact 5/21/2017 11:20 PM   Dressing Type Transparent 5/21/2017 11:20 PM   Hub Color/Line Status Pink;Capped;Flushed 5/21/2017 11:20 PM                        Intake & Output   Date 05/21/17 0700 - 05/22/17 0659 05/22/17 0700 - 05/23/17 0659   Shift 5208-7368 2351-3156 24 Hour Total 6749-7791 7023-6749 24 Hour Total   I  N  T  A  K  E   P.O.  236 236         P.O.  236 236       I.V.  (mL/kg/hr) 1233.4  (1.9) 1085 2318.4         Insulin Volume 10.9  10.9         Volume (dextrose 5% and 0.9% NaCl infusion) 1092.5  1092.5         Volume (0.45% sodium chloride infusion) 130 1085 1215         Volume (dextrose 10 % infusion 125-250 mL) 0  0       Shift Total  (mL/kg) 1233.4  (22.6) 1321  (24.2) 2554.4  (46.9)      O  U  T  P  U  T   Urine  (mL/kg/hr) 200  (0.3)  200         Urine Voided 200  200         Urine Occurrence(s) 1 x 1 x 2 x       Shift Total  (mL/kg) 200  (3.7)  200  (3.7)      NET 1033.4 1321 2354.4      Weight (kg) 54.5 54.5 54.5 54.5 54.5 54.5      Last Bowel Movement     Glucose Checks [] N/A  [x] AC/HS  [] Q6  Concerns: morning glucose level 208   Nutrition Active Orders   Diet    DIET DIABETIC CONSISTENT CARB Regular       Consults []PT  []OT  []Speech  [x]Case Management   Cardiac Monitoring [x]N/A []Yes Expires:

## 2017-05-22 NOTE — DISCHARGE INSTRUCTIONS
Patient Discharge Instructions    Erika Moreno / [id number] : 1990    Admitted 2017 Discharged: 2017         DISCHARGE DIAGNOSIS:       Uncontrolled Diabetes       Take Home Medications         General drug facts     If you have a very bad allergy, wear an allergy ID at all times. It is important that you take the medication exactly as they are prescribed. Keep your medication in the bottles provided by the pharmacist.  Keep a list of all your drugs (prescription, natural products, vitamins, OTC) with you. Give this list to your doctor. Do not take other medications without consulting your doctor. Do not share your drugs with others and do not take anyone else's drugs. Keep all drugs out of the reach of children and pets. Most drugs may be thrown away in household trash after mixing with coffee grounds or allen litter and sealing in a plastic bag. Keep a list Call your doctor for help with any side effects. If in the [place] you may also call the FDA at 0-831-PGN-3036    Talk with the doctor before starting any new drug, including OTC, natural products, or vitamins. What to do at Home    1. Recommended diet:diabetic diet , eat regular meals     2. Recommended activity: Activity as tolerated    3. If you experience any of the following symptoms then please call your primary care physician or return to the emergency room if you cannot get hold of your doctor: fever/chills/seizure     4. It is important that you take the medication exactly as they are prescribed. 5.Bring these papers with you to your follow up appointments. The papers will help your doctors be sure to continue the care plan from the hospital.      Follow-up with:   PCP: PROVIDER UNKNOWN  Follow-up Information     Follow up With Details Comments Contact Info    Provider Unknown In 1 week  Patient not available to ask             Please call for your own appointment        Information obtained by :   I understand that if any problems occur once I am at home I am to contact my physician. I understand and acknowledge receipt of the instructions indicated above.                                                                                                                                            Physician's or R.N.'s Signature                                                                  Date/Time                                                                                                                                              Patient or Representative Signature                                                          Date/Time

## 2017-05-22 NOTE — DIABETES MGMT
DTC Consult Note    Recommendations/ Comments:   Met with pt for consult, well know to DTC from previous hospital admissions. Pt stated that she knows what to do but just forgets to give her insulin injections. Pt reported that she was moving over the weekend and gave her morning insulin injection and then woke up the next day in the hospital. Pt states that everything has gone downhill since she came off of her insulin pump two years ago. Would like to go back on pump but medicaid will not cover. Pt reported that she does not eat very much and that is why she typically does not give her insulin injections, discussed with pt importance of basal insulin to avoid DKA. Pt aware of importance of insulin but not receptive to information. Pt declined any additional outpatient education and stated that she has done that before. Provided education materials and DTC contact for any future questions. Consult received for:  [x]             Assessment of home management                []      Medication Recommendations                []             Meter/monitoring     []             Insulin instruction     []             New diagnosis     []             Outpatient education     []             Insulin pump patient     []             Insulin infusion     []             DKA/HHS    Chart reviewed and initial evaluation complete on [address]. Patient is a 23 y.o. female with known history of Type 1 Diabetes on NPH 18 units ac b/d and Humalog 1:30 gm cho at home.       Assessed and instructed patient on the following:   ·  interpretation of lab results, blood sugar goals, complications of diabetes mellitus, SMBG skills and referred to Diabetes Educator    Encouraged the following:   · Taking insulin as prescribed    Provided patient with the following: [x]             Survival skills education materials               [x]             Insulin education materials               []             CHO counting education materials               [x]             Outpatient DTC contact number               []             Glucometer    Discussed with patient and/or family need for follow up appointment for diabetes management after discharge. A1c:   Lab Results   Component Value Date/Time    Hemoglobin A1c 14.2 05/21/2017 03:59 AM       Recent Glucose Results:   Lab Results   Component Value Date/Time     (H) 05/22/2017 04:30 AM    GLUCPOC 181 (H) 05/22/2017 12:10 PM    GLUCPOC 330 (H) 05/22/2017 07:52 AM    GLUCPOC 359 (H) 05/22/2017 07:50 AM        Lab Results   Component Value Date/Time    Creatinine 0.61 05/22/2017 04:30 AM     Estimated Creatinine Clearance: 111.5 mL/min (based on Cr of 0.61). Active Orders   Diet    DIET DIABETIC CONSISTENT CARB Regular        PO intake: No data found. Current hospital DM medication:   -Humalog normal sensitivity correction  -NPH 18 units two times daily before meals    Will continue to follow as needed. Thank you.     [facility], [address] [place] [address]  Office: [phone]

## 2017-07-15 PROBLEM — T68.XXXA HYPOTHERMIA: Status: ACTIVE | Noted: 2017-01-01

## 2017-07-15 NOTE — ED PROVIDER NOTES
HPI Comments: 23 y.o. female with past medical history significant for IDDM (on Novolin and Humalog), bipolar disorder, and ARF, who presents via EMS from a hotel for evaluation of unresponsive state. Per EMS, pt has been living in a hotel for the past \"several months\". Pt's friend reported to EMS that patient was alert and ambulatory this morning, but then became unresponsive prior to EMS arrival. Friend also reported that pt had been vomiting all day today. When EMS arrived, pt was unresponsive but \"moaning\". SpO2 was 100% on RA, heart rate was tachycardic at 110, BG was \"HIGH\" on glucometer, and last BP was 72/53 mmHg. Per medical records, pt was admitted to the hospital at the end of 2017 for tx of DKA with sz. There are no other acute medical concerns at this time. Full history, physical exam, and ROS unable to be obtained due to: unresponsive. Note written by Ching Lyn. Madiha Pringle, as dictated by Lydia Vogt MD 6:00 PM    The history is provided by the EMS personnel. The history is limited by the condition of the patient. Past Medical History:   Diagnosis Date    ARF (acute renal failure) ([address] 2017    Bipolar disorder ([address]     Contraception management     Gyn: Dr. Valentina Tavarez;  Nexplanon placed  and removed 7/6/15 because she wanted to conceive    Depression     Diabetes in pregnancy     Diabetes type I ([address]     Age 7; on insulin pump    HX OTHER MEDICAL     late pnc at 25 weeks    HX OTHER MEDICAL     polyhydramnios; previous pregnancy    Unspecified epilepsy without mention of intractable epilepsy     diabetic related       Past Surgical History:   Procedure Laterality Date     DELIVERY ONLY  , 13    2 cesareans         Family History:   Problem Relation Age of Onset    Hypertension Maternal Grandmother     High Cholesterol Maternal Grandmother     Hypertension Maternal Grandfather     High Cholesterol Maternal Grandfather     No Known Problems Mother     No Known Problems Father     Cancer Paternal Grandfather     Other Sister      stomach issues/gluten-lactose sensitive       Social History     Social History    Marital status: SINGLE     Spouse name: N/A    Number of children: N/A    Years of education: N/A     Occupational History    Not on file. Social History Main Topics    Smoking status: Current Every Day Smoker     Packs/day: 0.50     Years: 7.00     Types: Cigarettes    Smokeless tobacco: Never Used    Alcohol use No    Drug use: No    Sexual activity: Yes     Partners: Male     Birth control/ protection: Pill     Other Topics Concern    Not on file     Social History Narrative         ALLERGIES: Other medication and Sulfa (sulfonamide antibiotics)    Review of Systems   Unable to perform ROS: Patient unresponsive       Vitals:    07/15/17 1758 07/15/17 1815   BP: 123/75 122/63   Pulse: (!) 119 (!) 112   Resp: (!) 32 26   Temp: (!) 93.4 °F (34.1 °C)    SpO2:  100%            Physical Exam   Constitutional: She appears well-developed and well-nourished. No distress. Pt is moaning and vocalizes to pain, but does not respond to questions. She is covered in dried vomit. HENT:   Head: Normocephalic and atraumatic. Mouth/Throat: Mucous membranes are dry. Poor dentition. Eyes: Pupils are equal, round, and reactive to light. No scleral icterus. Neck: No thyromegaly present. Cardiovascular: Regular rhythm, normal heart sounds and intact distal pulses. Tachycardia present. No murmur heard. Pulmonary/Chest: Breath sounds normal. Tachypnea noted. No respiratory distress. Abdominal: Soft. Bowel sounds are normal. She exhibits no distension. There is no tenderness. Musculoskeletal: Normal range of motion. She exhibits no edema. Skin: Skin is warm and dry. No rash noted. She is not diaphoretic. Nursing note and vitals reviewed. Note written by Sergio Lopez. Jaylon Dykes, as dictated by Valentina Winters MD 6:00 PM.        MDM  Number of Diagnoses or Management Options  Acute renal failure, unspecified acute renal failure type [facility]):   Diabetic ketoacidosis with coma associated with type 1 diabetes mellitus ([facility]):   Lactic acidosis:   Diagnosis management comments: A:  23yo F with DM1 who presents with AMS and \"high\" BGL. Has h/o DKA and noncompliance. Was reportedly normal this AM per pt's roommate. Pt not able to give history at this time. Tachycardic and tachypnic. P:  ecg  cxr  Labs  UA  Serum ketones  IVF  Glucose stabilizer. ED Course       Procedures    Labs consistent with DKA. Recent Results (from the past 12 hour(s))   EKG, 12 LEAD, INITIAL    Collection Time: 07/15/17  5:57 PM   Result Value Ref Range    Ventricular Rate 118 BPM    Atrial Rate 118 BPM    P-R Interval 156 ms    QRS Duration 94 ms    Q-T Interval 342 ms    QTC Calculation (Bezet) 479 ms    Calculated P Axis 70 degrees    Calculated R Axis 66 degrees    Calculated T Axis 22 degrees    Diagnosis       Sinus tachycardia  Biatrial enlargement  Nonspecific ST abnormality  When compared with ECG of 11-MAR-2017 11:27,  T wave amplitude has increased in Anterior leads     CBC WITH AUTOMATED DIFF    Collection Time: 07/15/17  6:04 PM   Result Value Ref Range    WBC 28.5 (H) 3.6 - 11.0 K/uL    RBC 4.45 3.80 - 5.20 M/uL    HGB 14.0 11.5 - 16.0 g/dL    HCT 48.2 (H) 35.0 - 47.0 %    .3 (H) 80.0 - 99.0 FL    MCH 31.5 26.0 - 34.0 PG    MCHC 29.0 (L) 30.0 - 36.5 g/dL    RDW 14.3 11.5 - 14.5 %    PLATELET 855 (H) 895 - 400 K/uL    NEUTROPHILS 49 32 - 75 %    BAND NEUTROPHILS 4 0 - 6 %    LYMPHOCYTES 33 12 - 49 %    MONOCYTES 3 (L) 5 - 13 %    EOSINOPHILS 0 0 - 7 %    BASOPHILS 0 0 - 1 %    METAMYELOCYTES 6 (H) 0 %    MYELOCYTES 5 (H) 0 %    ABS. NEUTROPHILS 15.1 (H) 1.8 - 8.0 K/UL    ABS. LYMPHOCYTES 9.4 (H) 0.8 - 3.5 K/UL    ABS. MONOCYTES 0.9 0.0 - 1.0 K/UL    ABS. EOSINOPHILS 0.0 0.0 - 0.4 K/UL    ABS.  BASOPHILS 0.0 0.0 - 0.1 K/UL    DF MANUAL      RBC COMMENTS ANISOCYTOSIS  1+        RBC COMMENTS MACROCYTOSIS  PRESENT        RBC COMMENTS MARTIN CELLS  PRESENT        WBC COMMENTS Pathology Review Requested     METABOLIC PANEL, COMPREHENSIVE    Collection Time: 07/15/17  6:04 PM   Result Value Ref Range    Sodium 130 (L) 136 - 145 mmol/L    Potassium 4.1 3.5 - 5.1 mmol/L    Chloride 86 (L) 97 - 108 mmol/L    CO2 <5 (LL) 21 - 32 mmol/L    Anion gap Cannot be calulated 5 - 15 mmol/L    Glucose 1266 (HH) 65 - 100 mg/dL    BUN 33 (H) 6 - 20 MG/DL    Creatinine 1.81 (H) 0.55 - 1.02 MG/DL    BUN/Creatinine ratio 18 12 - 20      GFR est AA 41 (L) >60 ml/min/1.73m2    GFR est non-AA 34 (L) >60 ml/min/1.73m2    Calcium 9.5 8.5 - 10.1 MG/DL    Bilirubin, total 0.6 0.2 - 1.0 MG/DL    ALT (SGPT) 37 12 - 78 U/L    AST (SGOT) 25 15 - 37 U/L    Alk.  phosphatase 210 (H) 45 - 117 U/L    Protein, total 7.9 6.4 - 8.2 g/dL    Albumin 3.4 (L) 3.5 - 5.0 g/dL    Globulin 4.5 (H) 2.0 - 4.0 g/dL    A-G Ratio 0.8 (L) 1.1 - 2.2     URINALYSIS W/MICROSCOPIC    Collection Time: 07/15/17  6:04 PM   Result Value Ref Range    Color YELLOW/STRAW      Appearance CLOUDY (A) CLEAR      Specific gravity 1.027 1.003 - 1.030      pH (UA) 5.0 5.0 - 8.0      Protein 30 (A) NEG mg/dL    Glucose >1000 (A) NEG mg/dL    Ketone >80 (A) NEG mg/dL    Bilirubin NEGATIVE  NEG      Blood LARGE (A) NEG      Urobilinogen 0.2 0.2 - 1.0 EU/dL    Nitrites NEGATIVE  NEG      Leukocyte Esterase NEGATIVE  NEG      WBC 0-4 0 - 4 /hpf    RBC 5-10 0 - 5 /hpf    Epithelial cells FEW FEW /lpf    Bacteria NEGATIVE  NEG /hpf    Hyaline cast 2-5 0 - 5 /lpf   DRUG SCREEN, URINE    Collection Time: 07/15/17  6:04 PM   Result Value Ref Range    AMPHETAMINES NEGATIVE  NEG      BARBITURATES NEGATIVE  NEG      BENZODIAZEPINE NEGATIVE  NEG      COCAINE NEGATIVE  NEG      METHADONE NEGATIVE  NEG      OPIATES NEGATIVE  NEG      PCP(PHENCYCLIDINE) NEGATIVE  NEG      THC (TH-CANNABINOL) NEGATIVE  NEG      Drug screen comment (NOTE)    MAGNESIUM    Collection Time: 07/15/17  6:04 PM   Result Value Ref Range    Magnesium 3.1 (H) 1.6 - 2.4 mg/dL   PHOSPHORUS    Collection Time: 07/15/17  6:04 PM   Result Value Ref Range    Phosphorus 10.6 (H) 2.6 - 4.7 MG/DL   POC VENOUS BLOOD GAS    Collection Time: 07/15/17  6:05 PM   Result Value Ref Range    Device: ROOM AIR      pH, venous (POC) 6.815 (LL) 7.32 - 7.42      pCO2, venous (POC) 19.4 (L) 41 - 51 MMHG    pO2, venous (POC) 77 (H) 25 - 40 mmHg    HCO3, venous (POC) 3.1 (L) 23.0 - 28.0 MMOL/L    sO2, venous (POC) 78 65 - 88 %    Base deficit, venous (POC) <30 mmol/L    Allens test (POC) N/A      Site OTHER      Specimen type (POC) VENOUS BLOOD     LACTIC ACID    Collection Time: 07/15/17  6:07 PM   Result Value Ref Range    Lactic acid 4.1 (HH) 0.4 - 2.0 MMOL/L   BETA-HYDROXYBUTYRATE    Collection Time: 07/15/17  6:07 PM   Result Value Ref Range    B-hydroxybutyrate >4.42 (H) <0.40 mmol/L   ETHYL ALCOHOL    Collection Time: 07/15/17  6:07 PM   Result Value Ref Range    ALCOHOL(ETHYL),SERUM <10 <10 MG/DL   GLUCOSE, POC    Collection Time: 07/15/17  6:13 PM   Result Value Ref Range    Glucose (POC) >600 (HH) 65 - 100 mg/dL    Performed by Elenita Putnam    GLUCOSE, POC    Collection Time: 07/15/17  6:16 PM   Result Value Ref Range    Glucose (POC) >600 (HH) 65 - 100 mg/dL    Performed by Ian SULLIVAN    HCG URINE, QL. - POC    Collection Time: 07/15/17  6:18 PM   Result Value Ref Range    Pregnancy test,urine (POC) NEGATIVE  NEG     GLUCOSTABILIZER    Collection Time: 07/15/17  6:33 PM   Result Value Ref Range    Glucose 601 mg/dL    Insulin order 10.8 units/hour    Insulin adminstered 10.8 units/hour    Multiplier 0.020     Low target 150 mg/dL    High target 250 mg/dL    D50 order 0.0 ml    D50 administered 0.00 ml    Minutes until next BG 60 min    Order initials kh     Administered initials kh     GLSCOM Comments     GLUCOSE, POC    Collection Time: 07/15/17  7:33 PM   Result Value Ref Range Glucose (POC) >600 (HH) 65 - 100 mg/dL    Performed by Joy Liu    Collection Time: 07/15/17  7:34 PM   Result Value Ref Range    Glucose 601 mg/dL    Insulin order 16.2 units/hour    Insulin adminstered 16.2 units/hour    Multiplier 0.030     Low target 150 mg/dL    High target 250 mg/dL    D50 order 0.0 ml    D50 administered 0.00 ml    Minutes until next BG 60 min    Order initials kh     Administered initials kh     GLSCOM Comments     POC VENOUS BLOOD GAS    Collection Time: 07/15/17  7:48 PM   Result Value Ref Range    Device: ROOM AIR      pH, venous (POC) 6.885 (LL) 7.32 - 7.42      pCO2, venous (POC) <16.0 (L) 41 - 51 MMHG    pO2, venous (POC) 66 (H) 25 - 40 mmHg    Allens test (POC) N/A      Site OTHER      Specimen type (POC) VENOUS BLOOD     GLUCOSE, POC    Collection Time: 07/15/17  8:44 PM   Result Value Ref Range    Glucose (POC) >600 (HH) 65 - 100 mg/dL    Performed by Ondina LAURA    GLUCOSTABILIZER    Collection Time: 07/15/17  9:06 PM   Result Value Ref Range    Glucose 601 mg/dL    Insulin order 21.6 units/hour    Insulin adminstered 21.6 units/hour    Multiplier 0.040     Low target 150 mg/dL    High target 250 mg/dL    D50 order 0.0 ml    D50 administered 0.00 ml    Minutes until next BG 60 min    Order initials aai     Administered initials aai     GLSCOM Comments       Labs reviewed. Glucose stabilizer initiated. 3000cc NS bolus  Pt given AMP of Bicarb for profound acidosis followed by Bicarb drip. Empirically given abx but no obvious source of infection identified. I doubt infectious etiology. VS holding stable with tachycardia and mild tachypnea. O2 sats normal.  BP normal.      CONSULT NOTE:  7:35 PM Magy Johnson MD spoke with Dr. Aniket Sky, Consult for Hospitalist.  Discussed available diagnostic tests and clinical findings. He is in agreement with care plans as outlined. Dr. Aniket Sky will evaluate Pt for admission.       Procedure Note - Central Venous Access:   Performed by Deangelo Kumar MD    Obtained emergent Consent. Immediately prior to the procedure, the patient was reevaluated and found suitable for the planned procedure and any planned medications. Immediately prior to the procedure a time out was called to verify the correct patient, procedure, equipment, staff, and marking as appropriate. The site was prepped with ChloraPrep. Using Seldinger technique a Triple Lumen CVC was placed in the Right, Internal Jugular Vein via direct cannulation with 1 number of attempts for IV Access. Ultrasound Guidance was utilized. There was good blood return. The following complications were encountered: None. A follow-up chest x-ray was ordered post procedure. The procedure was tolerated well. XR Results (most recent):    Results from Hospital Encounter encounter on 07/15/17   XR CHEST PORT   Narrative EXAM:  XR CHEST PORT    INDICATION:  central line placement    COMPARISON:  July 15, 2017, 1831 hours    FINDINGS: A portable AP radiograph of the chest was obtained at 2027 hours. There is interval placement of a right IJ line extending to the inferior aspect  of the right atrium. There is no pneumothorax or pleural effusion. Patchy left  basilar atelectasis is demonstrated in the interval, with otherwise clear lungs. Mediastinal and hilar contours are normal.  The bones and soft tissues are  grossly within normal limits. Impression IMPRESSION: Right terminates in the right atrium and should be pulled back. 10:23 PM  Central Line pulled back approx 3 cm and re-secured to skin using purse-string suture. 8:45 PM  I have spent *49** minutes of critical care time involved in lab review, consultations with specialist, family decision-making, and documentation. During this entire length of time I was immediately available to the patient and/or family.

## 2017-07-15 NOTE — IP AVS SNAPSHOT
Summary of Care Report The Summary of Care report has been created to help improve care coordination. Users with access to [employer] or [address] (Web-based application) may access additional patient information including the Discharge Summary. If you are not currently a [address] user and need more information, please call the number listed below in the [place] section and ask to be connected with Medical Records. Facility Information Name Address Phone [address] [facility] 7 [phone] [phone] Patient Information Patient Name Sex  Evangelina Ochoa ([id number]) Female 1990 Discharge Information Admitting Provider Service Area Unit Joana Gallegos MD / [address] / [phone] Discharge Provider Discharge Date/Time Discharge Disposition Destination (none) 2017 21:00 (Pending) AHR (none) Patient Language Language ENGLISH [13] Hospital Problems as of 2017  Reviewed: 7/15/2017  8:10 PM by Joana Gallegos MD  
  
  
  
 Class Noted - Resolved Last Modified POA Active Problems Bipolar disorder [facility])  Unknown - Present 7/15/2017 by Joana Gallegos MD Yes Entered by Sylvie Montero LPN Dehydration  2017 - Present 7/15/2017 by Joana Gallegos MD Yes Entered by Devorah Baez MD  
  Leukocytosis  2017 - Present 7/15/2017 by Joana Gallegos MD Yes Entered by Devorah Baez MD  
  Lactic acidemia  2017 - Present 7/15/2017 by Joana Gallegos MD Yes Entered by Devorah Baez MD  
  SIRS (systemic inflammatory response syndrome) ([facility] [address]  2017 - Present 7/15/2017 by Joana Gallegos MD Yes   Entered by Devorah Baez MD  
  * (LSHIGFWNR)YML, type 1 [facility])  3/11/2017 - Present 7/15/2017 by Sanjuana Coates Mary Reyna MD Yes Entered by Cynthia Lyn MD  
  Acute renal failure (ARF) ([facility] [address]  4/12/2017 - Present 7/15/2017 by Rosi Gonzalez MD Yes Entered by Venancio Covarrubias MD  
  DKA (diabetic ketoacidoses) [facility])  5/20/2017 - Present 7/15/2017 by Rosi Gonzalez MD Yes Entered by Aminata Jackson MD  
  Hypothermia  7/15/2017 - Present 7/15/2017 by Rosi Gonzalez MD Unknown Entered by Rosi Gonzalez MD  
  
Non-Hospital Problems as of 7/18/2017  Reviewed: 7/15/2017  8:10 PM by Rosi Gonzalez MD  
  
  
  
 Class Noted - Resolved Last Modified Active Problems DKA, type 1, not at goal [facility])  Unknown - Present 2/21/2017 by Samy Flaherty MD  
  Entered by Dione Simental LPN Overview Addendum 12/25/2015  8:17 PM by Luis Hurt MD  
   Type 1 DM diagnosed at age 7, on insulin pump since age 9   Partial epilepsy with impairment of consciousness ([facility] [address]  2/12/2015 - Present 2/21/2017 by Samy Flaherty MD  
  Entered by Giovanni Vaz MD  
  Seizure disorder [facility])  12/25/2015 - Present 2/21/2017 by Samy Flaherty MD  
  Entered by Luis Hurt MD  
  Anxiety disorder  12/25/2015 - Present 2/21/2017 by Samy Flaherty MD  
  Entered by Luis Hurt MD  
  OCD (obsessive compulsive disorder)  12/25/2015 - Present 2/21/2017 by Samy Flaherty MD  
  Entered by Luis Hurt MD  
  Mixed hyperlipidemia  8/3/2016 - Present 2/21/2017 by Samy Flaherty MD  
  Entered by Luis Hurt MD  
  Insulin pump in place  8/3/2016 - Present 2/21/2017 by Samy Flaherty MD  
  Entered by Luis Hurt MD  
  ARF (acute renal failure) ([facility] [address]  2/21/2017 - Present 2/21/2017 by Samy Flaherty MD  
  Entered by Samy Flaherty MD  
  Acidosis, metabolic  8/67/7252 - Present 2/21/2017 by Samy Flaherty MD  
  Entered by Samy Flaherty MD  
  Acute encephalopathy  2/21/2017 - Present 2/21/2017 by Samy Flaherty MD  
  Entered by Samy Flaherty MD  
 Sinus tachycardia  2/21/2017 - Present 2/21/2017 by Devorah Baez MD  
  Entered by Devorah Baez MD  
  
You are allergic to the following Allergen Reactions Other Medication Rash Tegaderm Sulfa (Sulfonamide Antibiotics) Hives Current Discharge Medication List  
  
CONTINUE these medications which have NOT CHANGED Dose & Instructions Dispensing Information Comments HumaLOG 100 unit/mL injection Generic drug:  insulin lispro  
 by SubCUTAneous route. 1 unit per 30 carbs Refills:  0  
   
 insulin  unit/mL injection Commonly known as:  Sebastien Thomas Dose:  18 Units 18 Units by SubCUTAneous route Before breakfast and dinner. Quantity:  3 Vial  
Refills:  2  
   
 insulin syringe-needle U-100 0.3 mL 31 gauge x 15/64\" Syrg Dose:  1 Each  
1 Each by SubCUTAneous route five (5) times daily. Follow-up needed for refills Quantity:  150 Pen Needle Refills:  0  
   
 lamoTRIgine 200 mg tablet Commonly known as: LaMICtal  
 Dose:  200 mg Take 1 Tab by mouth two (2) times a day. Quantity:  60 Tab Refills:  1 Current Immunizations Name Date Influenza Vaccine 1/1/2017, 12/29/2012 Pneumococcal Vaccine (Unspecified Type) 1/1/2008 Tdap 8/2/2016 Follow-up Information Follow up With Details Comments Contact Info Provider Unknown   Patient not available to ask Free clinic  On 7/19/2017 Discharge Instructions Discharge Instructions PATIENT ID: Alayna Hodges MRN: [id number] YOB: 1990 DATE OF ADMISSION: 7/15/2017  5:49 PM   
DATE OF DISCHARGE: 7/18/2017 PRIMARY CARE PROVIDER: PROVIDER UNKNOWN  
 
ATTENDING PHYSICIAN: Mily Martinez MD 
DISCHARGING PROVIDER: Mily Martinez MD   
To contact this individual call [phone] and ask the  to page. If unavailable ask to be transferred the Adult Hospitalist Department. DISCHARGE DIAGNOSES  
DKA Anemia Peripheral neuropathy CONSULTATIONS: IP CONSULT TO HOSPITALIST 
 
PROCEDURES/SURGERIES: * No surgery found * PENDING TEST RESULTS:  
At the time of discharge the following test results are still pending: none FOLLOW UP APPOINTMENTS:  
Follow-up Information Follow up With Details Comments Contact Info Provider Unknown   Patient not available to ask Free clinic  On 7/19/2017 ADDITIONAL CARE RECOMMENDATIONS:  
Blood sugar checks twice daily Drink plenty of fluids If symptoms worsen please come to the ER Expect to have a blood work for your anemia Please let your PMD know about your left foot DIET: Diabetic Diet/Drink plenty of fluids ACTIVITY: Activity as tolerated DISCHARGE MEDICATIONS: 
 See Medication Reconciliation Form · It is important that you take the medication exactly as they are prescribed. · Keep your medication in the bottles provided by the pharmacist and keep a list of the medication names, dosages, and times to be taken in your wallet. · Do not take other medications without consulting your doctor. NOTIFY YOUR PHYSICIAN FOR ANY OF THE FOLLOWING:  
Fever over 101 degrees for 24 hours. Chest pain, shortness of breath, fever, chills, nausea, vomiting, diarrhea, change in mentation, falling, weakness, bleeding. Severe pain or pain not relieved by medications. Or, any other signs or symptoms that you may have questions about. DISPOSITION: 
x  Home With: 
 OT  PT  [place]  RN  
  
 SNF/Inpatient Rehab/LTAC Independent/assisted living Hospice Other: CDMP Checked:  
Yes x PROBLEM LIST Updated: 
Yes x Signed:  
Barbara Carr MD 
7/18/2017 
12:42 PM 
 
Chart Review Routing History Recipient Method Report Sent By Jose Singh NP Fax: [phone] Phone: [phone] Fax Provider Comm Report Fartun Chen MD [[id number]] 9/21/2011 10:33 PM 09/21/2011 Joseph Sanchez MD  
Fax: [phone] Phone: [phone] Fax Provider Comm Report Braxton Hough MD [[id number]] 9/21/2011 10:33 PM 09/21/2011 Roselynn Jeans, MD  
Phone: [phone] In Basket Note Review Braxton Hough MD [[id number]] 4/17/2013  3:31 PM 04/17/2013 Phuong Muir MD  
Phone: [phone] In Basket Note Review Braxton Hough MD [[id number]] 5/8/2013  2:25 PM 04/17/2013 Provider Unknown, MD  
Patient not available to ask [address] IP Auto Routed Notes MD Kaylynn Locke 12/1/2015  8:24 PM 12/01/2015 Provider Unknown, MD  
Patient not available to ask [address] IP Auto Routed Notes Jackson Blanchard MD [[id number]] 12/4/2015 12:58 PM 12/04/2015 Alicja Downey MD  
Phone: [phone] In Basket IP Auto Routed Notes Kat Mathesw MD [[id number]] 2/21/2017  7:01 PM 02/21/2017 Provider Unknown, MD  
Patient not available to ask [address] IP Auto Routed Notes Stephani Finley MD [[id number]] 3/11/2017  3:25 PM 03/11/2017 Provider Unknown, MD  
Patient not available to ask [address] IP Auto Routed Notes Stephani Finley MD [[id number]] 3/11/2017  3:34 PM 03/11/2017 Provider Unknown, MD  
Patient not available to ask [address] IP Auto Routed Notes Stephani Finley MD [[id number]] 3/11/2017  4:15 PM 03/11/2017 Provider Unknown, MD  
Patient not available to ask [address] IP Auto Routed Notes MD Sandra Ly 3/15/2017 10:01 PM 03/15/2017 Provider Unknown, MD  
Patient not available to ask [address] IP Auto Routed Notes Michelle Amaya MD [[id number]] 4/12/2017  1:15 PM 04/12/2017 Provider Unknown, MD  
Patient not available to ask [address] IP Auto Routed Notes Orion Wade MD [[id number]] 4/14/2017  2:18 PM 04/14/2017 Provider Unknown, MD  
Patient not available to ask [address] IP Auto Routed Notes [facility]ira Sanchez [facility] 5/21/2017 12:10 AM 05/21/2017 Provider Unknown, MD  
Patient not available to ask [address] IP Auto Routed Notes Karthikeyan Teran MD [[id number]] 5/22/2017 10:30 AM 05/22/2017 Provider Unknown, MD  
Patient not available to ask [address] IP Auto Routed Padmini Guerra MD [[id number]] 7/16/2017 10:03 PM 07/16/2017 Provider Unknown, MD  
Patient not available to ask [address] IP Auto Routed Notes Nakia Cloud MD [[id number]] 7/18/2017 12:45 PM 07/18/2017 Provider Unknown, MD  
Patient not available to ask [address] IP Auto Routed Notes Nakia Cloud MD [[id number]] 7/18/2017 12:46 PM 07/18/2017

## 2017-07-15 NOTE — IP AVS SNAPSHOT
[address] 
[phone] Patient: Rickey Ocampo MRN: [id number] :1990 Current Discharge Medication List  
  
CONTINUE these medications which have NOT CHANGED Dose & Instructions Dispensing Information Comments Morning Noon Evening Bedtime HumaLOG 100 unit/mL injection Generic drug:  insulin lispro Your last dose was: Your next dose is:    
   
   
 by SubCUTAneous route. 1 unit per 30 carbs Refills:  0  
     
   
   
   
  
 insulin  unit/mL injection Commonly known as:  Cate Sep Your last dose was: Your next dose is:    
   
   
 Dose:  18 Units 18 Units by SubCUTAneous route Before breakfast and dinner. Quantity:  3 Vial  
Refills:  2  
     
   
   
   
  
 insulin syringe-needle U-100 0.3 mL 31 gauge x 15/64\" Syrg Your last dose was: Your next dose is:    
   
   
 Dose:  1 Each  
1 Each by SubCUTAneous route five (5) times daily. Follow-up needed for refills Quantity:  150 Pen Needle Refills:  0  
     
   
   
   
  
 lamoTRIgine 200 mg tablet Commonly known as: LaMICtal  
   
Your last dose was: Your next dose is:    
   
   
 Dose:  200 mg Take 1 Tab by mouth two (2) times a day. Quantity:  60 Tab Refills:  1

## 2017-07-15 NOTE — ED TRIAGE NOTES
Arrives via EMS from hotel room where she has been living for a few months per friend who was in room with her for unresponsiveness. Pt covered in emesis. Hx DKA. Friend states pt has been vomiting all day.

## 2017-07-15 NOTE — IP AVS SNAPSHOT
[address] 
[phone] Patient: Briana Flores MRN: [id number] :1990 You are allergic to the following Allergen Reactions Other Medication Rash Tegaderm Sulfa (Sulfonamide Antibiotics) Hives Recent Documentation Height Weight BMI OB Status Smoking Status 1.651 m 50.2 kg 18.42 kg/m2 Having regular periods Current Every Day Smoker Unresulted Labs Order Current Status CULTURE, BLOOD, PAIRED Preliminary result Emergency Contacts Name Discharge Info Relation Home Work Mobile Maryanne Yan  Parent [1] [phone] Supa Brown     [phone] About your hospitalization You were admitted on:  July 15, 2017 You last received care in the:  [id number] [facility] You were discharged on:  2017 Unit phone number:  [phone] Why you were hospitalized Your primary diagnosis was:  Dka, Type 1 (Hcc) Your diagnoses also included:  Dka (Diabetic Ketoacidoses) (Hcc), Acute Renal Failure (Arf) (Hcc), Leukocytosis, Dehydration, Bipolar Disorder (Hcc), Hypothermia, Lactic Acidemia, Sirs (Systemic Inflammatory Response Syndrome) (Hcc) Providers Seen During Your Hospitalizations Provider Role Specialty Primary office phone Sapphire Michele MD Attending Provider Emergency Medicine [phone] Jenna Navarro MD Attending Provider Hospitalist [phone] Charmayne Dillon, MD Attending Provider Internal Medicine [phone] Jaun Simeon MD Attending Provider Internal Medicine [phone] Nakia Cloud MD Attending Provider Internal Medicine [phone] Your Primary Care Physician (PCP) Primary Care Physician Office Phone Office Fax UNKNOWN, PROVIDER ** None ** ** None ** Follow-up Information Follow up With Details Comments Contact Info Provider Unknown   Patient not available to ask Free clinic  On 7/19/2017 Current Discharge Medication List  
  
CONTINUE these medications which have NOT CHANGED Dose & Instructions Dispensing Information Comments Morning Noon Evening Bedtime HumaLOG 100 unit/mL injection Generic drug:  insulin lispro Your last dose was: Your next dose is:    
   
   
 by SubCUTAneous route. 1 unit per 30 carbs Refills:  0  
     
   
   
   
  
 insulin  unit/mL injection Commonly known as:  Candie Nuneze Your last dose was: Your next dose is:    
   
   
 Dose:  18 Units 18 Units by SubCUTAneous route Before breakfast and dinner. Quantity:  3 Vial  
Refills:  2  
     
   
   
   
  
 insulin syringe-needle U-100 0.3 mL 31 gauge x 15/64\" Syrg Your last dose was: Your next dose is:    
   
   
 Dose:  1 Each  
1 Each by SubCUTAneous route five (5) times daily. Follow-up needed for refills Quantity:  150 Pen Needle Refills:  0  
     
   
   
   
  
 lamoTRIgine 200 mg tablet Commonly known as: LaMICtal  
   
Your last dose was: Your next dose is:    
   
   
 Dose:  200 mg Take 1 Tab by mouth two (2) times a day. Quantity:  60 Tab Refills:  1 Discharge Instructions Discharge Instructions PATIENT ID: Melany Colvin MRN: [id number] YOB: 1990 DATE OF ADMISSION: 7/15/2017  5:49 PM   
DATE OF DISCHARGE: 7/18/2017 PRIMARY CARE PROVIDER: PROVIDER UNKNOWN  
 
ATTENDING PHYSICIAN: Sammy Luu MD 
DISCHARGING PROVIDER: Sammy Luu MD   
To contact this individual call [phone] and ask the  to page. If unavailable ask to be transferred the Adult Hospitalist Department. DISCHARGE DIAGNOSES  
DKA Anemia Peripheral neuropathy CONSULTATIONS: IP CONSULT TO HOSPITALIST 
 
PROCEDURES/SURGERIES: * No surgery found * PENDING TEST RESULTS:  
At the time of discharge the following test results are still pending: none FOLLOW UP APPOINTMENTS:  
Follow-up Information Follow up With Details Comments Contact Info Provider Unknown   Patient not available to ask Free clinic  On 7/19/2017 ADDITIONAL CARE RECOMMENDATIONS:  
Blood sugar checks twice daily Drink plenty of fluids If symptoms worsen please come to the ER Expect to have a blood work for your anemia Please let your PMD know about your left foot DIET: Diabetic Diet/Drink plenty of fluids ACTIVITY: Activity as tolerated DISCHARGE MEDICATIONS: 
 See Medication Reconciliation Form · It is important that you take the medication exactly as they are prescribed. · Keep your medication in the bottles provided by the pharmacist and keep a list of the medication names, dosages, and times to be taken in your wallet. · Do not take other medications without consulting your doctor. NOTIFY YOUR PHYSICIAN FOR ANY OF THE FOLLOWING:  
Fever over 101 degrees for 24 hours. Chest pain, shortness of breath, fever, chills, nausea, vomiting, diarrhea, change in mentation, falling, weakness, bleeding. Severe pain or pain not relieved by medications. Or, any other signs or symptoms that you may have questions about. DISPOSITION: 
x  Home With: 
 OT  PT  [facility]  RN  
  
 SNF/Inpatient Rehab/LTAC Independent/assisted living Hospice Other: CDMP Checked:  
Yes x PROBLEM LIST Updated: 
Yes x Signed:  
Carito Montague MD 
7/18/2017 
12:42 PM 
 
Discharge Orders None [facility] Announcement We are excited to announce that we are making your provider's discharge notes available to you in [employer]. You will see these notes when they are completed and signed by the physician that discharged you from your recent hospital stay.   If you have any questions or concerns about any information you see in [employer]arshot, please call the Health Information Department where you were seen or reach out to your Primary Care Provider for more information about your plan of care. Introducing [facility]! José Miguel Ledezma introduces [employer]ot patient portal. Now you can access parts of your medical record, email your doctor's office, and request medication refills online. 1. In your internet browser, go to [email or web address] 2. Click on the First Time User? Click Here link in the Sign In box. You will see the New Member Sign Up page. 3. Enter your Earshot Access Code exactly as it appears below. You will not need to use this code after youve completed the sign-up process. If you do not sign up before the expiration date, you must request a new code. · [employer]arshot Access Code: [email or web address] Expires: 8/27/2017  4:39 PM 
 
4. Enter the last four digits of your Social Security Number (xxxx) and Date of Birth (mm/dd/yyyy) as indicated and click Submit. You will be taken to the next sign-up page. 5. Create a Earshot ID. This will be your Earshot login ID and cannot be changed, so think of one that is secure and easy to remember. 6. Create a Earshot password. You can change your password at any time. 7. Enter your Password Reset Question and Answer. This can be used at a later time if you forget your password. 8. Enter your e-mail address. You will receive e-mail notification when new information is available in [address] 9. Click Sign Up. You can now view and download portions of your medical record. 10. Click the Download Summary menu link to download a portable copy of your medical information. If you have questions, please visit the Frequently Asked Questions section of the E[employer]ot website. Remember, [employer]ot is NOT to be used for urgent needs. For medical emergencies, dial 911. Now available from your iPhone and Android! General Information Please provide this summary of care documentation to your next provider. Patient Signature:  ____________________________________________________________ Date:  ____________________________________________________________  
  
Miladys Fines Provider Signature:  ____________________________________________________________ Date:  ____________________________________________________________

## 2017-07-16 NOTE — CONSULTS
Consult Date: 2017    Consults    Subjective   23year old female with DM type 1, bipolar disorder who was found unresponsive in the hotel where she is currently residing. Was brought to the ER where she was found to be in DKA with a blood sugar of > 1200, elevated AG, and pH of 6.8. Was given IVF, started on an insulin drip, and admitted to the ICU. She was also empirically started on antibiotics due to elevated WBC count and unclear etiology of DKA. Overnight BS came down, gap closed, and NPH ordered. This morning she is sleeping and not very interactive. Says she is feeling better. Tells me she was taking her insulin appropriately prior to admission but cannot stay awake long enough to provide any subjective history. Past Medical History:  2017: ARF (acute renal failure) ([facility])  No date: Bipolar disorder ([facility]  No date: Contraception management     Comment: Gyn: Dr. 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   Past Surgical History:  , 14:  DELIVERY ONLY     Comment: 2 cesareans    Family History    Hypertension Maternal Grandmother     High Cholesterol Maternal Grandmother     Hypertension Maternal Grandfather     High Cholesterol Maternal Grandfather     No Known Problems Mother     No Known Problems Father     Cancer Paternal Grandfather     Other Sister     Comment: stomach issues/gluten-lactose sensitive         Smoking status: Current Every Day Smoker  0.50 Packs/day  For 7.00 Years     Types: Cigarettes    Smokeless tobacco: Never Used    Alcohol use No       Current Facility-Administered Medications:  miconazole (MICOTIN) 200 mg vaginal suppository 200 mg 200 mg Vaginal QHS Vanessa Bolton  mg at 07/16/17 0217    nystatin (MYCOSTATIN) 100,000 unit/gram ointment  Topical TID Vanessa Bolton MD     potassium chloride 20 mEq/50 mL IVPB premix pgbk         magnesium sulfate 2 g/50 ml 2 gram/50 mL (4 %) IVPB premix pgbk         sodium phosphate 8.04 mmol in 0.9% sodium chloride 250 mL infusion  IntraVENous PRN Ramon Vega MD     insulin NPH (NOVOLIN N, HUMULIN N) injection 10 Units 10 Units SubCUTAneous ACB&D Ramon Vega MD     dextrose 5 % - 0.45% NaCl infusion 250 mL/hr IntraVENous CONTINUOUS Ramon Vega MD     insulin regular (NOVOLIN R, HUMULIN R) 100 Units in 0.9% sodium chloride 100 mL infusion 0-50 Units/hr IntraVENous TITRATE Sammi Tompkins MD Last Rate: 0.9 mL/hr at 07/16/17 1210 0.9 Units/hr at 07/16/17 1210   dextrose 10 % infusion 125-250 mL 125-250 mL IntraVENous PRN Sammi Tompkins MD     lamoTRIgine (LaMICtal) tablet 200 mg 200 mg Oral BID Vanessa Bolton MD Stopped at 07/16/17 0900    sodium chloride (NS) flush 5-10 mL 5-10 mL IntraVENous Veronique Ramirez MD 10 mL at 07/16/17 0629    sodium chloride (NS) flush 5-10 mL 5-10 mL IntraVENous PRN Vanessa Bolton MD     glucose chewable tablet 16 g 4 Tab Oral PRN Vanessa Bolton MD     glucagon (GLUCAGEN) injection 1 mg 1 mg IntraMUSCular PRN Vanessa Bolton MD     acetaminophen (TYLENOL) suppository 650 mg 650 mg Rectal Q4H PRN Vanessa Bolton MD     ondansetron [facility]) injection 4 mg 4 mg IntraVENous Q4H PRN Vanessa Bolton MD 4 mg at 07/16/17 0016    heparin (porcine) injection 5,000 Units 5,000 Units SubCUTAneous Veronique Ramirez MD 5,000 Units at 07/16/17 0907    dextrose 10 % infusion 125-250 mL 125-250 mL IntraVENous PRN Vanessa Bolton MD          Review of Systems   Unable to perform ROS: Mental status change       Objective    Vital signs for last 24 hours: /70  Pulse (!) 111  Temp 99.1 °F (37.3 °C)  Resp 19  Ht 5' 5\" (1.651 m)  Wt 50.2 kg (110 lb 10.7 oz)  SpO2 99%  BMI 18.42 kg/m2    Intake/Output this shift:  Current Shift: 07/16 0701 - 07/16 1900  In: 1189.5 [I.V.:1189.5]  Out: 150 [Urine:150]  Last 3 Shifts: 07/14 1901 - 07/16 0700  In: 6436.2 [I.V.:6436.2]  Out: 2700 [Urine:2700]    Data Review:   Recent Results (from the past 24 hour(s))  -EKG, 12 LEAD, INITIAL   Collection Time: 07/15/17  5:57 PM   Result Value Ref Range   Ventricular Rate 118 BPM   Atrial Rate 118 BPM   P-R Interval 156 ms   QRS Duration 94 ms   Q-T Interval 342 ms   QTC Calculation (Bezet) 479 ms   Calculated P Axis 70 degrees   Calculated R Axis 66 degrees   Calculated T Axis 22 degrees   Diagnosis Sinus tachycardia  Biatrial enlargement  Nonspecific ST abnormality  When compared with ECG of 11-MAR-2017 11:27,  T wave amplitude has increased in Anterior leads  Confirmed by Heidy Kira ([id number]) on 7/16/2017 9:49:36 AM      -CBC WITH AUTOMATED DIFF   Collection Time: 07/15/17  6:04 PM   Result Value Ref Range   WBC 28.5 (H) 3.6 - 11.0 K/uL   RBC 4.45 3.80 - 5.20 M/uL   HGB 14.0 11.5 - 16.0 g/dL   HCT 48.2 (H) 35.0 - 47.0 %   .3 (H) 80.0 - 99.0 FL   MCH 31.5 26.0 - 34.0 PG   MCHC 29.0 (L) 30.0 - 36.5 g/dL   RDW 14.3 11.5 - 14.5 %   PLATELET 517 (H) 911 - 400 K/uL   NEUTROPHILS 49 32 - 75 %   BAND NEUTROPHILS 4 0 - 6 %   LYMPHOCYTES 33 12 - 49 %   MONOCYTES 3 (L) 5 - 13 %   EOSINOPHILS 0 0 - 7 %   BASOPHILS 0 0 - 1 %   METAMYELOCYTES 6 (H) 0 %   MYELOCYTES 5 (H) 0 %   ABS. NEUTROPHILS 15.1 (H) 1.8 - 8.0 K/UL   ABS. LYMPHOCYTES 9.4 (H) 0.8 - 3.5 K/UL   ABS. MONOCYTES 0.9 0.0 - 1.0 K/UL   ABS. EOSINOPHILS 0.0 0.0 - 0.4 K/UL   ABS.  BASOPHILS 0.0 0.0 - 0.1 K/UL   DF MANUAL    RBC COMMENTS ANISOCYTOSIS  1+      RBC COMMENTS MACROCYTOSIS  PRESENT      RBC COMMENTS MARTIN CELLS  PRESENT      WBC COMMENTS Pathology Review Requested    Pathologist review Leukocytosis, granulocytosis with left shift, lymphocytosis with reactive lymphs. Increased platelets. RBCs with mild anisocytosis. Per Olivia Blood MD.    -METABOLIC PANEL, COMPREHENSIVE   Collection Time: 07/15/17  6:04 PM   Result Value Ref Range   Sodium 130 (L) 136 - 145 mmol/L   Potassium 4.1 3.5 - 5.1 mmol/L   Chloride 86 (L) 97 - 108 mmol/L   CO2 <5 (LL) 21 - 32 mmol/L   Anion gap Cannot be calulated 5 - 15 mmol/L   Glucose 1266 (HH) 65 - 100 mg/dL   BUN 33 (H) 6 - 20 MG/DL   Creatinine 1.81 (H) 0.55 - 1.02 MG/DL   BUN/Creatinine ratio 18 12 - 20     GFR est AA 41 (L) >60 ml/min/1.73m2   GFR est non-AA 34 (L) >60 ml/min/1.73m2   Calcium 9.5 8.5 - 10.1 MG/DL   Bilirubin, total 0.6 0.2 - 1.0 MG/DL   ALT (SGPT) 37 12 - 78 U/L   AST (SGOT) 25 15 - 37 U/L   Alk.  phosphatase 210 (H) 45 - 117 U/L   Protein, total 7.9 6.4 - 8.2 g/dL   Albumin 3.4 (L) 3.5 - 5.0 g/dL   Globulin 4.5 (H) 2.0 - 4.0 g/dL   A-G Ratio 0.8 (L) 1.1 - 2.2     -URINALYSIS W/MICROSCOPIC   Collection Time: 07/15/17  6:04 PM   Result Value Ref Range   Color YELLOW/STRAW    Appearance CLOUDY (A) CLEAR     Specific gravity 1.027 1.003 - 1.030     pH (UA) 5.0 5.0 - 8.0     Protein 30 (A) NEG mg/dL   Glucose >1000 (A) NEG mg/dL   Ketone >80 (A) NEG mg/dL   Bilirubin NEGATIVE  NEG     Blood LARGE (A) NEG     Urobilinogen 0.2 0.2 - 1.0 EU/dL   Nitrites NEGATIVE  NEG     Leukocyte Esterase NEGATIVE  NEG     WBC 0-4 0 - 4 /hpf   RBC 5-10 0 - 5 /hpf   Epithelial cells FEW FEW /lpf   Bacteria NEGATIVE  NEG /hpf   Hyaline cast 2-5 0 - 5 /lpf   -DRUG SCREEN, URINE   Collection Time: 07/15/17  6:04 PM   Result Value Ref Range   AMPHETAMINES NEGATIVE  NEG     BARBITURATES NEGATIVE  NEG     BENZODIAZEPINE NEGATIVE  NEG     COCAINE NEGATIVE  NEG     METHADONE NEGATIVE  NEG     OPIATES NEGATIVE  NEG     PCP(PHENCYCLIDINE) NEGATIVE  NEG     THC (TH-CANNABINOL) NEGATIVE  NEG     Drug screen comment (NOTE)    -MAGNESIUM   Collection Time: 07/15/17  6:04 PM   Result Value Ref Range   Magnesium 3.1 (H) 1.6 - 2.4 mg/dL   -PHOSPHORUS   Collection Time: 07/15/17  6:04 PM   Result Value Ref Range   Phosphorus 10.6 (H) 2.6 - 4.7 MG/DL   -POC VENOUS BLOOD GAS   Collection Time: 07/15/17  6:05 PM   Result Value Ref Range   Device: ROOM AIR    pH, venous (POC) 6.815 (LL) 7.32 - 7.42     pCO2, venous (POC) 19.4 (L) 41 - 51 MMHG   pO2, venous (POC) 77 (H) 25 - 40 mmHg   HCO3, venous (POC) 3.1 (L) 23.0 - 28.0 MMOL/L   sO2, venous (POC) 78 65 - 88 %   Base deficit, venous (POC) <30 mmol/L   Allens test (POC) N/A    Site OTHER    Specimen type (POC) VENOUS BLOOD    -CULTURE, BLOOD, PAIRED   Collection Time: 07/15/17  6:07 PM   Result Value Ref Range   Special Requests: NO SPECIAL REQUESTS    Culture result: NO GROWTH AFTER 11 HOURS    -LACTIC ACID   Collection Time: 07/15/17  6:07 PM   Result Value Ref Range   Lactic acid 4.1 (HH) 0.4 - 2.0 MMOL/L   -BETA-HYDROXYBUTYRATE   Collection Time: 07/15/17  6:07 PM   Result Value Ref Range   B-hydroxybutyrate >4.42 (H) <0.40 mmol/L   -ETHYL ALCOHOL   Collection Time: 07/15/17  6:07 PM   Result Value Ref Range   ALCOHOL(ETHYL),SERUM <10 <10 MG/DL   -GLUCOSE, POC   Collection Time: 07/15/17  6:13 PM   Result Value Ref Range   Glucose (POC) >600 (HH) 65 - 100 mg/dL   Performed by [address] [place] [address], POC   Collection Time: 07/15/17  6:16 PM   Result Value Ref Range   Glucose (POC) >600 (HH) 65 - 100 mg/dL   Performed by Talib Aguilera    -HCG URINE, QL. - POC   Collection Time: 07/15/17  6:18 PM   Result Value Ref Range   Pregnancy test,urine (POC) NEGATIVE  NEG     -GLUCOSTABILIZER   Collection Time: 07/15/17  6:33 PM   Result Value Ref Range   Glucose 601 mg/dL   Insulin order 10.8 units/hour   Insulin adminstered 10.8 units/hour   Multiplier 0.020    Low target 150 mg/dL   High target 250 mg/dL   D50 order 0.0 ml   D50 administered 0.00 ml   Minutes until next BG 60 min   Order initials kh    Administered initials kh    GLSCOM Comments     -GLUCOSE, POC Collection Time: 07/15/17  7:33 PM   Result Value Ref Range   Glucose (POC) >600 (HH) 65 - 100 mg/dL   Performed by Kellee Zelaya   Collection Time: 07/15/17  7:34 PM   Result Value Ref Range   Glucose 601 mg/dL   Insulin order 16.2 units/hour   Insulin adminstered 16.2 units/hour   Multiplier 0.030    Low target 150 mg/dL   High target 250 mg/dL   D50 order 0.0 ml   D50 administered 0.00 ml   Minutes until next BG 60 min   Order initials kh    Administered initials kh    GLSCOM Comments     -POC VENOUS BLOOD GAS   Collection Time: 07/15/17  7:48 PM   Result Value Ref Range   Device: ROOM AIR    pH, venous (POC) 6.885 (LL) 7.32 - 7.42     pCO2, venous (POC) <16.0 (L) 41 - 51 MMHG   pO2, venous (POC) 66 (H) 25 - 40 mmHg   Allens test (POC) N/A    Site OTHER    Specimen type (POC) VENOUS BLOOD    -GLUCOSE, POC   Collection Time: 07/15/17  8:44 PM   Result Value Ref Range   Glucose (POC) >600 (HH) 65 - 100 mg/dL   Performed by MOSHE AMI    -METABOLIC PANEL, BASIC   Collection Time: 07/15/17  8:50 PM   Result Value Ref Range   Sodium 143 136 - 145 mmol/L   Potassium 2.2 (LL) 3.5 - 5.1 mmol/L   Chloride 104 97 - 108 mmol/L   CO2 5 (LL) 21 - 32 mmol/L   Anion gap 34 (H) 5 - 15 mmol/L   Glucose 721 (HH) 65 - 100 mg/dL   BUN 28 (H) 6 - 20 MG/DL   Creatinine 1.38 (H) 0.55 - 1.02 MG/DL   BUN/Creatinine ratio 20 12 - 20     GFR est AA 56 (L) >60 ml/min/1.73m2   GFR est non-AA 47 (L) >60 ml/min/1.73m2   Calcium 7.7 (L) 8.5 - 10.1 MG/DL   -GLUCOSTABILIZER   Collection Time: 07/15/17  9:06 PM   Result Value Ref Range   Glucose 601 mg/dL   Insulin order 21.6 units/hour   Insulin adminstered 21.6 units/hour   Multiplier 0.040    Low target 150 mg/dL   High target 250 mg/dL   D50 order 0.0 ml   D50 administered 0.00 ml   Minutes until next BG 60 min   Order initials aai    Administered initials aai    GLSCOM Comments     -LACTIC ACID   Collection Time: 07/15/17  9:46 PM   Result Value Ref Range   Lactic acid 5.4 (HH) 0.4 - 2.0 MMOL/L   -POC G3 - PUL   Collection Time: 07/15/17 10:17 PM   Result Value Ref Range   pH (POC) 7.028 (LL) 7.35 - 7.45     pCO2 (POC) 16.3 (L) 35.0 - 45.0 MMHG   pO2 (POC) 91 80 - 100 MMHG   HCO3 (POC) 4.3 (L) 22 - 26 MMOL/L   sO2 (POC) 92 92 - 97 %   Base deficit (POC) 27 mmol/L   Site RIGHT RADIAL    Device: ROOM AIR    Allens test (POC) YES    Specimen type (POC) ARTERIAL    -GLUCOSE, POC   Collection Time: 07/15/17 10:22 PM   Result Value Ref Range   Glucose (POC) 452 (H) 65 - 100 mg/dL   Performed by MOSHE AMI    -GLUCOSE, POC   Collection Time: 07/15/17 10:24 PM   Result Value Ref Range   Glucose (POC) 519 (H) 65 - 100 mg/dL   Performed by Kt Brady AMI    -GLUCOSTABILIZER   Collection Time: 07/15/17 10:25 PM   Result Value Ref Range   Glucose 519 mg/dL   Insulin order 23.0 units/hour   Insulin adminstered 23.0 units/hour   Multiplier 0.050    Low target 150 mg/dL   High target 250 mg/dL   D50 order 0.0 ml   D50 administered 0.00 ml   Minutes until next BG 60 min   Order initials aai    Administered initials aai    GLSCOM Comments     -GLUCOSE, POC   Collection Time: 07/16/17 12:05 AM   Result Value Ref Range   Glucose (POC) 339 (H) 65 - 100 mg/dL   Performed by Dionicio Horner [place]) [address] [facility]   Collection Time: 07/16/17 12:05 AM   Result Value Ref Range   Glucose 339 mg/dL   Insulin order 14.0 units/hour   Insulin adminstered 14.0 units/hour   Multiplier 0.050    Low target 150 mg/dL   High target 250 mg/dL   D50 order 0.0 ml   D50 administered 0.00 ml   Minutes until next BG 60 min   Order initials sd    Administered initials sd    GLSCOM Comments     -LACTIC ACID   Collection Time: 07/16/17 12:31 AM   Result Value Ref Range   Lactic acid 8.0 (HH) 0.4 - 2.0 MMOL/L   -METABOLIC PANEL, BASIC   Collection Time: 07/16/17 12:31 AM   Result Value Ref Range   Sodium 149 (H) 136 - 145 mmol/L   Potassium 2.6 (LL) 3.5 - 5.1 mmol/L   Chloride 112 (H) 97 - 108 mmol/L   CO2 11 (LL) 21 - 32 mmol/L   Anion gap 26 (H) 5 - 15 mmol/L   Glucose 278 (H) 65 - 100 mg/dL   BUN 21 (H) 6 - 20 MG/DL   Creatinine 1.46 (H) 0.55 - 1.02 MG/DL   BUN/Creatinine ratio 14 12 - 20     GFR est AA 53 (L) >60 ml/min/1.73m2   GFR est non-AA 44 (L) >60 ml/min/1.73m2   Calcium 8.3 (L) 8.5 - 10.1 MG/DL   -MAGNESIUM   Collection Time: 07/16/17 12:31 AM   Result Value Ref Range   Magnesium 1.3 (L) 1.6 - 2.4 mg/dL   -PHOSPHORUS   Collection Time: 07/16/17 12:31 AM   Result Value Ref Range   Phosphorus 1.8 (L) 2.6 - 4.7 MG/DL   -GLUCOSE, POC   Collection Time: 07/16/17  1:08 AM   Result Value Ref Range   Glucose (POC) 241 (H) 65 - 100 mg/dL   Performed by SHADOW) [employer]   Collection Time: 07/16/17  1:08 AM   Result Value Ref Range   Glucose 241 mg/dL   Insulin order 9.1 units/hour   Insulin adminstered 9.1 units/hour   Multiplier 0.050    Low target 150 mg/dL   High target 250 mg/dL   D50 order 0.0 ml   D50 administered 0.00 ml   Minutes until next BG 60 min   Order initials sd    Administered initials sd    GLSCOM Comments     -GLUCOSE, POC   Collection Time: 07/16/17  2:11 AM   Result Value Ref Range   Glucose (POC) 148 (H) 65 - 100 mg/dL   Performed by [employer]   Collection Time: 07/16/17  2:12 AM   Result Value Ref Range   Glucose 148 mg/dL   Insulin order 3.5 units/hour   Insulin adminstered 3.5 units/hour   Multiplier 0.040    Low target 150 mg/dL   High target 250 mg/dL   D50 order 0.0 ml   D50 administered 0.00 ml   Minutes until next BG 60 min   Order initials sd    Administered initials sd    GLSCOM Comments     -POC G3 - PUL   Collection Time: 07/16/17  2:58 AM   Result Value Ref Range   FIO2 (POC) 21 %   pH (POC) 7.282 (L) 7.35 - 7.45     pCO2 (POC) 36.8 35.0 - 45.0 MMHG   pO2 (POC) 84 80 - 100 MMHG   HCO3 (POC) 17.4 (L) 22 - 26 MMOL/L   sO2 (POC) 95 92 - 97 %   Base deficit (POC) 9 mmol/L   Site RIGHT RADIAL    Device: ROOM AIR    Allens test (POC) YES    Specimen type (POC) ARTERIAL    Total resp.  rate 20    -GLUCOSE, POC   Collection Time: 07/16/17  3:16 AM   Result Value Ref Range   Glucose (POC) 211 (H) 65 - 100 mg/dL   Performed by Carol Simon) [address] [employer]   Collection Time: 07/16/17  3:17 AM   Result Value Ref Range   Glucose 211 mg/dL   Insulin order 6.0 units/hour   Insulin adminstered 6.0 units/hour   Multiplier 0.040    Low target 150 mg/dL   High target 250 mg/dL   D50 order 0.0 ml   D50 administered 0.00 ml   Minutes until next BG 60 min   Order initials sd    Administered initials sd    GLSCOM Comments     -GLUCOSE, POC   Collection Time: 07/16/17  4:20 AM   Result Value Ref Range   Glucose (POC) 189 (H) 65 - 100 mg/dL   Performed by Carol Simon) [address] [employer]   Collection Time: 07/16/17  4:21 AM   Result Value Ref Range   Glucose 189 mg/dL   Insulin order 5.2 units/hour   Insulin adminstered 5.2 units/hour   Multiplier 0.040    Low target 150 mg/dL   High target 250 mg/dL   D50 order 0.0 ml   D50 administered 0.00 ml   Minutes until next BG 60 min   Order initials sd    Administered initials sd    GLSCOM Comments     -HEMOGLOBIN A1C WITH EAG   Collection Time: 07/16/17  5:01 AM   Result Value Ref Range   Hemoglobin A1c 14.1 (H) 4.2 - 6.3 %   Est. average glucose 358 mg/dL   -LACTIC ACID   Collection Time: 07/16/17  5:01 AM   Result Value Ref Range   Lactic acid 5.1 (HH) 0.4 - 2.0 MMOL/L   -GLUCOSE, POC   Collection Time: 07/16/17  5:24 AM   Result Value Ref Range   Glucose (POC) 202 (H) 65 - 100 mg/dL   Performed by Carol Simon) [address] [employer]   Collection Time: 07/16/17  5:24 AM   Result Value Ref Range   Glucose 202 mg/dL   Insulin order 5.7 units/hour   Insulin adminstered 5.7 units/hour   Multiplier 0.040    Low target 150 mg/dL   High target 250 mg/dL   D50 order 0.0 ml   D50 administered 0.00 ml   Minutes until next BG 60 min   Order initials sd    Administered initials sd    GLSCOM Comments     -GLUCOSE, POC   Collection Time: 07/16/17  6:31 AM   Result Value Ref Range   Glucose (POC) 201 (H) 65 - 100 mg/dL   Performed by Ene [place]) [address] [facility] [employer]   Collection Time: 07/16/17  6:31 AM   Result Value Ref Range   Glucose 201 mg/dL   Insulin order 5.6 units/hour   Insulin adminstered 5.6 units/hour   Multiplier 0.040    Low target 150 mg/dL   High target 250 mg/dL   D50 order 0.0 ml   D50 administered 0.00 ml   Minutes until next BG 60 min   Order initials sd    Administered initials sd    GLSCOM Comments     -METABOLIC PANEL, BASIC   Collection Time: 07/16/17  6:48 AM   Result Value Ref Range   Sodium 144 136 - 145 mmol/L   Potassium 4.4 3.5 - 5.1 mmol/L   Chloride 112 (H) 97 - 108 mmol/L   CO2 22 21 - 32 mmol/L   Anion gap 10 5 - 15 mmol/L   Glucose 176 (H) 65 - 100 mg/dL   BUN 16 6 - 20 MG/DL   Creatinine 1.23 (H) 0.55 - 1.02 MG/DL   BUN/Creatinine ratio 13 12 - 20     GFR est AA >60 >60 ml/min/1.73m2   GFR est non-AA 53 (L) >60 ml/min/1.73m2   Calcium 8.2 (L) 8.5 - 10.1 MG/DL   -MAGNESIUM   Collection Time: 07/16/17  6:48 AM   Result Value Ref Range   Magnesium 2.1 1.6 - 2.4 mg/dL   -PHOSPHORUS   Collection Time: 07/16/17  6:48 AM   Result Value Ref Range   Phosphorus 1.8 (L) 2.6 - 4.7 MG/DL   -CBC WITH AUTOMATED DIFF   Collection Time: 07/16/17  6:48 AM   Result Value Ref Range   WBC 9.0 3.6 - 11.0 K/uL   RBC 3.55 (L) 3.80 - 5.20 M/uL   HGB 11.0 (L) 11.5 - 16.0 g/dL   HCT 32.0 (L) 35.0 - 47.0 %   MCV 90.1 80.0 - 99.0 FL   MCH 31.0 26.0 - 34.0 PG   MCHC 34.4 30.0 - 36.5 g/dL   RDW 13.4 11.5 - 14.5 %   PLATELET 628 288 - 069 K/uL   NEUTROPHILS 76 (H) 32 - 75 %   LYMPHOCYTES 17 12 - 49 %   MONOCYTES 7 5 - 13 %   EOSINOPHILS 0 0 - 7 %   BASOPHILS 0 0 - 1 %   ABS. NEUTROPHILS 6.8 1.8 - 8.0 K/UL   ABS. LYMPHOCYTES 1.5 0.8 - 3.5 K/UL   ABS. MONOCYTES 0.6 0.0 - 1.0 K/UL   ABS. EOSINOPHILS 0.0 0.0 - 0.4 K/UL   ABS.  BASOPHILS 0.0 0.0 - 0.1 K/UL   -GLUCOSE, POC   Collection Time: 07/16/17  7:38 AM   Result Value Ref Range   Glucose (POC) 179 (H) 65 - 100 mg/dL   Performed by Cb Dave [address] [facility]   Collection Time: 07/16/17  7:39 AM   Result Value Ref Range   Glucose 179 mg/dL   Insulin order 4.8 units/hour   Insulin adminstered 4.8 units/hour   Multiplier 0.040    Low target 150 mg/dL   High target 250 mg/dL   D50 order 0.0 ml   D50 administered 0.00 ml   Minutes until next BG 60 min   Order initials sd    Administered initials sd    GLSCOM Comments     -GLUCOSE, POC   Collection Time: 07/16/17  8:48 AM   Result Value Ref Range   Glucose (POC) 144 (H) 65 - 100 mg/dL   Performed by [address]   Collection Time: 07/16/17  8:50 AM   Result Value Ref Range   Glucose 144 mg/dL   Insulin order 2.5 units/hour   Insulin adminstered 2.5 units/hour   Multiplier 0.030    Low target 150 mg/dL   High target 250 mg/dL   D50 order 0.0 ml   D50 administered 0.00 ml   Minutes until next BG 60 min   Order initials MW    Administered initials     GLSCOM Comments     -GLUCOSE, POC   Collection Time: 07/16/17  9:50 AM   Result Value Ref Range   Glucose (POC) 151 (H) 65 - 100 mg/dL   Performed by Krunal Pi    -GLUCOSTABILIZER   Collection Time: 07/16/17  9:50 AM   Result Value Ref Range   Glucose 151 mg/dL   Insulin order 2.7 units/hour   Insulin adminstered 2.7 units/hour   Multiplier 0.030    Low target 150 mg/dL   High target 250 mg/dL   D50 order 0.0 ml   D50 administered 0.00 ml   Minutes until next BG 60 min   Order initials [facility]    Administered initials     GLSCOM Comments     -GLUCOSE, POC   Collection Time: 07/16/17 10:49 AM   Result Value Ref Range   Glucose (POC) 135 (H) 65 - 100 mg/dL   Performed by Krunal Pi    -GLUCOSTABILIZER   Collection Time: 07/16/17 10:50 AM   Result Value Ref Range   Glucose 135 mg/dL   Insulin order 1.5 units/hour   Insulin adminstered 1.5 units/hour   Multiplier 0.020    Low target 150 mg/dL   High target 250 mg/dL   D50 order 0.0 ml   D50 administered 0.00 ml   Minutes until next BG 60 min   Order initials [facility]    Administered initials     GLSCOM Comments     -METABOLIC PANEL, BASIC   Collection Time: 07/16/17 10:53 AM   Result Value Ref Range   Sodium 143 136 - 145 mmol/L   Potassium 3.7 3.5 - 5.1 mmol/L   Chloride 110 (H) 97 - 108 mmol/L   CO2 26 21 - 32 mmol/L   Anion gap 7 5 - 15 mmol/L   Glucose 122 (H) 65 - 100 mg/dL   BUN 13 6 - 20 MG/DL   Creatinine 1.07 (H) 0.55 - 1.02 MG/DL   BUN/Creatinine ratio 12 12 - 20     GFR est AA >60 >60 ml/min/1.73m2   GFR est non-AA >60 >60 ml/min/1.73m2   Calcium 8.3 (L) 8.5 - 10.1 MG/DL   -MAGNESIUM   Collection Time: 07/16/17 10:53 AM   Result Value Ref Range   Magnesium 2.2 1.6 - 2.4 mg/dL   -LACTIC ACID   Collection Time: 07/16/17 11:01 AM   Result Value Ref Range   Lactic acid 3.6 (HH) 0.4 - 2.0 MMOL/L   -GLUCOSE, POC   Collection Time: 07/16/17 12:08 PM   Result Value Ref Range   Glucose (POC) 148 (H) 65 - 100 mg/dL   Performed by Dino Thomas   Collection Time: 07/16/17 12:09 PM   Result Value Ref Range   Glucose 148 mg/dL   Insulin order 0.9 units/hour   Insulin adminstered 0.9 units/hour   Multiplier 0.010    Low target 150 mg/dL   High target 250 mg/dL   D50 order 0.0 ml   D50 administered 0.00 ml   Minutes until next BG 60 min   Order initials MW    Administered initials MW    GLSCOM Comments         Physical Exam   Constitutional:   Thin, chronically ill appearing   HENT:   Head: Normocephalic and atraumatic. Eyes: Conjunctivae are normal.   Neck: Neck supple. Cardiovascular: Normal rate and regular rhythm. No murmur heard. Pulmonary/Chest: Effort normal and breath sounds normal. She has no wheezes. She has no rales. Abdominal: Soft. Bowel sounds are normal. She exhibits no distension. Musculoskeletal: She exhibits no edema. Neurological:   Sleeping, minimally interactive   Skin: Skin is warm and dry.      Assessment/Plan:    26 year old female with DM type 1, bipolar disorder admitted with DKA    Impression:  -DKA, etiology not clear (suspect noncompliance)  -YVROSE, improving  -AMS, improving  -seizure disorder  -leukocytosis  -bipolar disorder  -tobacco abuse    Plan:  -continue IVF  -NPH ordered  -turn off insulin drip  -start diet  -watch sugars/AG off drip  -A1C 14  -continue levofloxacin, stop if cultures negative  -continue home seizure meds  -watch renal function  -case management consult---living in hotel, ? If she is able to afford her insulin  -check urine pregnancy test  -head CT negative  -heparin ppx    Should be stable to transfer out of the ICU once insulin drip off.

## 2017-07-16 NOTE — PROGRESS NOTES
Hospitalist Progress Note  Rogelio Merino MD  Office: [phone]  Cell: [phone]      Date of Service:  2017  NAME:  Sandip Johnson  :  1990  MRN:  [id number]      Admission Summary:    23year old female with history of DM-1, bipolar disorder, seizure disorder presented on 7/15/17 with unresponsiveness. She was found to be in DKA. Interval history / Subjective:     Tired this morning, not very cooperative with interview, but woke up after repeated prompting and sternal rub. She reports only minimal compliance to her insulin regimen and more compliance, but not complete compliance to her antiepileptic regimen. She will not divulge why she hasn't been keeping up with this. She is living in a hotel currently and became tearful when she talked about it. Assessment & Plan:     1. DKA, DM-1   - likely secondary to non-adherence, non-compliance with insulin regimen   - was on insulin drip, gap closed now, transition to NPH (reduced to 10 units BID from prior 18 units) due to poor appetite   - continue IV fluid hydration   - DTC consulted   - diabetic diet   - may be transferred to remote telemetry    2. Seizure disorder   - unclear if her unresponsiveness if secondary to seizure in the setting of poor compliance with anti-epileptics; she was diagnosed with hyperglycemic seizure on previous hospitalization as well   - resume home Lamictal    3. Leukocytosis   - suspect this is reactive secondary to volume depletion   - she does not otherwise exhibit signs suggestive of infective process   - stop antibiotics for now    4. Vaginal yeast infection   - started on miconazole suppository    5.   Bipolar disorder   - she has not followed with a behavioral health counselor \"in a while\"   - would benefit from outpatient follow-up    Code status: FULL  DVT prophylaxis: heparin    Care Plan discussed with: Patient/Family and Nurse  Disposition: Home w/Family     Hospital Problems  Date Reviewed: 7/15/2017          Codes Class Noted POA    Hypothermia ICD-10-CM: T68. XXXA  ICD-9-CM: 991.6  7/15/2017 Unknown        DKA (diabetic ketoacidoses) ([facility] ICD-10-CM: E13.10  ICD-9-CM: 250.10  5/20/2017 Yes        Acute renal failure (ARF) ([facility] ICD-10-CM: N17.9  ICD-9-CM: 584.9  4/12/2017 Yes        * (Principal)DKA, type 1 ([facility] ICD-10-CM: E10.10  ICD-9-CM: 250.13  3/11/2017 Yes        Dehydration ICD-10-CM: E86.0  ICD-9-CM: 276.51  2/21/2017 Yes        Leukocytosis ICD-10-CM: I30.012  ICD-9-CM: 288.60  2/21/2017 Yes        Lactic acidemia ICD-10-CM: E87.2  ICD-9-CM: 276.2  2/21/2017 Yes        SIRS (systemic inflammatory response syndrome) ([facility] ICD-10-CM: R65.10  ICD-9-CM: 995.90  2/21/2017 Yes        Bipolar disorder ([facility] ICD-10-CM: F31.9  ICD-9-CM: 296.80  Unknown Yes                Review of Systems:   A comprehensive review of systems was negative except for that written in the HPI. Vital Signs:    Last 24hrs VS reviewed since prior progress note. Most recent are:  Visit Vitals    /78 (BP 1 Location: Right arm)    Pulse 88    Temp 98.4 °F (36.9 °C)    Resp 16    Ht 5' 5\" (1.651 m)    Wt 50.2 kg (110 lb 10.7 oz)    SpO2 98%    BMI 18.42 kg/m2         Intake/Output Summary (Last 24 hours) at 07/16/17 1917  Last data filed at 07/16/17 1855   Gross per 24 hour   Intake          48851.4 ml   Output             3735 ml   Net           8314.4 ml        Physical Examination:             Constitutional:  No acute distress, cooperative, pleasant    ENT:  Oral mucous moist, oropharynx benign. Neck supple,    Resp:  CTA bilaterally. No wheezing/rhonchi/rales. No accessory muscle use   CV:  Regular rhythm, normal rate, no murmurs, gallops, rubs    GI:  Soft, non distended, non tender. normoactive bowel sounds, no hepatosplenomegaly     Musculoskeletal:  No edema, warm, 2+ pulses throughout    Neurologic:  Moves all extremities. AAOx3, CN II-XII reviewed     Psych:  Not anxious or agitated, fair insight, tearful  Skin:  Good turgor, no rashes or ulcers       Data Review:    Review and/or order of clinical lab test      Labs:     Recent Labs      07/16/17   0648  07/15/17   1804   WBC  9.0  28.5*   HGB  11.0*  14.0   HCT  32.0*  48.2*   PLT  193  455*     Recent Labs      07/16/17   1053  07/16/17   0648  07/16/17   0031   07/15/17   1804   NA  143  144  149*   < >  130*   K  3.7  4.4  2.6*   < >  4.1   CL  110*  112*  112*   < >  86*   CO2  26  22  11*   < >  <5*   BUN  13  16  21*   < >  33*   CREA  1.07*  1.23*  1.46*   < >  1.81*   GLU  122*  176*  278*   < >  1266*   CA  8.3*  8.2*  8.3*   < >  9.5   MG  2.2  2.1  1.3*   --   3.1*   PHOS   --   1.8*  1.8*   --   10.6*    < > = values in this interval not displayed. Recent Labs      07/15/17   1804   SGOT  25   ALT  37   AP  210*   TBILI  0.6   TP  7.9   ALB  3.4*   GLOB  4.5*     No results for input(s): INR, PTP, APTT in the last 72 hours. No lab exists for component: INREXT   No results for input(s): FE, TIBC, PSAT, FERR in the last 72 hours. No results found for: FOL, RBCF   No results for input(s): PH, PCO2, PO2 in the last 72 hours. No results for input(s): CPK, CKNDX, TROIQ in the last 72 hours.     No lab exists for component: CPKMB  Lab Results   Component Value Date/Time    Cholesterol, total 324 07/12/2017 07:55 PM    HDL Cholesterol 50 07/12/2017 07:55 PM    LDL,Direct 181 01/04/2017 07:10 PM    LDL, calculated 218.4 07/12/2017 07:55 PM    Triglyceride 278 07/12/2017 07:55 PM    CHOL/HDL Ratio 6.5 07/12/2017 07:55 PM     Lab Results   Component Value Date/Time    Glucose (POC) 158 07/16/2017 03:25 PM    Glucose (POC) 197 07/16/2017 02:18 PM    Glucose (POC) 213 07/16/2017 01:14 PM    Glucose (POC) 148 07/16/2017 12:08 PM    Glucose (POC) 135 07/16/2017 10:49 AM     Lab Results   Component Value Date/Time    Color YELLOW/STRAW 07/15/2017 06:04 PM    Appearance CLOUDY 07/15/2017 06:04 PM    Specific gravity 1.027 07/15/2017 06:04 PM    Specific gravity 1.020 12/29/2012 02:00 PM    pH (UA) 5.0 07/15/2017 06:04 PM    Protein 30 07/15/2017 06:04 PM    Glucose >1000 07/15/2017 06:04 PM    Ketone >80 07/15/2017 06:04 PM    Bilirubin NEGATIVE  07/15/2017 06:04 PM    Urobilinogen 0.2 07/15/2017 06:04 PM    Nitrites NEGATIVE  07/15/2017 06:04 PM    Leukocyte Esterase NEGATIVE  07/15/2017 06:04 PM    Epithelial cells FEW 07/15/2017 06:04 PM    Bacteria NEGATIVE  07/15/2017 06:04 PM    WBC 0-4 07/15/2017 06:04 PM    RBC 5-10 07/15/2017 06:04 PM         Medications Reviewed:     Current Facility-Administered Medications   Medication Dose Route Frequency    miconazole (MICOTIN) 200 mg vaginal suppository 200 mg  200 mg Vaginal QHS    nystatin (MYCOSTATIN) 100,000 unit/gram ointment   Topical TID    insulin NPH (NOVOLIN N, HUMULIN N) injection 10 Units  10 Units SubCUTAneous ACB&D    dextrose 5 % - 0.45% NaCl infusion  250 mL/hr IntraVENous CONTINUOUS    dextrose 10 % infusion 125-250 mL  125-250 mL IntraVENous PRN    lamoTRIgine (LaMICtal) tablet 200 mg  200 mg Oral BID    sodium chloride (NS) flush 5-10 mL  5-10 mL IntraVENous Q8H    sodium chloride (NS) flush 5-10 mL  5-10 mL IntraVENous PRN    glucose chewable tablet 16 g  4 Tab Oral PRN    glucagon (GLUCAGEN) injection 1 mg  1 mg IntraMUSCular PRN    acetaminophen (TYLENOL) suppository 650 mg  650 mg Rectal Q4H PRN    ondansetron (ZOFRAN) injection 4 mg  4 mg IntraVENous Q4H PRN    heparin (porcine) injection 5,000 Units  5,000 Units SubCUTAneous Q8H    dextrose 10 % infusion 125-250 mL  125-250 mL IntraVENous PRN     ______________________________________________________________________  EXPECTED LENGTH OF STAY: - - -  ACTUAL LENGTH OF STAY:          1                 Theodore Chappell MD

## 2017-07-16 NOTE — PROGRESS NOTES
0730: Bedside and Verbal shift change report given to Keanu Joseph RN (oncoming nurse) by Jarrod Edward RN (offgoing nurse). Report included the following information SBAR, Kardex, Intake/Output, MAR, Med Rec Status, Cardiac Rhythm Sinus Tachycardic and Alarm Parameters . 0800: Assumed care of patient. Insulin gtt, sinus tachycardic, sleeping, responds to voice, oriented x4, room air, right central line, Sampson. 1110: Labs drawn per orders. 1200: Assessment unchanged, patient sleeping. 1540: Insulin gtt stopped per orders. Dr. Thnaia good MD ordered ACHS glucose checks & transfer to Remote Telemetry. 1600: Assessment unchanged, VSS.    1700: TRANSFER - OUT REPORT:    Verbal report given to Eva Alvarez RN on [address]  being transferred to [facility] Room 552 for routine progression of care       Report consisted of patients Situation, Background, Assessment and   Recommendations(SBAR). Information from the following report(s) SBAR, Kardex, Intake/Output, MAR, Recent Results, Cardiac Rhythm NSR and Alarm Parameters  was reviewed with the receiving nurse. Lines:   Triple Lumen right IJ 07/15/17 Right Internal jugular (Active)   Central Line Being Utilized Yes 7/16/2017 12:00 PM   Criteria for Appropriate Use Limited/no vessel suitable for conventional peripheral access 7/16/2017 12:00 PM   Site Assessment Clean, dry, & intact 7/16/2017 12:00 PM   Infiltration Assessment 0 7/16/2017 12:00 PM   Affected Extremity/Extremities Color distal to insertion site pink (or appropriate for race); Pulses palpable 7/16/2017 12:00 PM   Date of Last Dressing Change 07/15/17 7/16/2017 12:00 PM   Dressing Status Clean, dry, & intact 7/16/2017 12:00 PM   Dressing Type Disk with Chlorhexadine gluconate (CHG); Bacteriocidal;Transparent 7/16/2017 12:00 PM   Action Taken Open ports on tubing capped 7/16/2017 12:00 PM   Proximal Hub Color/Line Status White; Infusing;Flushed 7/16/2017 12:00 PM   Positive Blood Return (Medial Site) Yes 7/16/2017 12:00 PM   Medial Hub Color/Line Status Yeni Mitch; Infusing 7/16/2017 12:00 PM   Positive Blood Return (Lateral Site) Yes 7/16/2017 12:00 PM   Distal Hub Color/Line Status Brown; Infusing;Flushed 7/16/2017 12:00 PM   Positive Blood Return (Site #3) Yes 7/16/2017 12:00 PM   Alcohol Cap Used Yes 7/16/2017 12:00 PM       Peripheral IV 48/65/15 Right Basilic (Active)   Site Assessment Clean, dry, & intact 7/16/2017  4:00 PM   Phlebitis Assessment 0 7/16/2017  4:00 PM   Infiltration Assessment 0 7/16/2017  4:00 PM   Dressing Status Clean, dry, & intact 7/16/2017  4:00 PM   Dressing Type Disk with Chlorhexadine gluconate (CHG); Bacteriocidal;Transparent 7/16/2017  4:00 PM   Hub Color/Line Status Green;Capped 7/16/2017  4:00 PM   Action Taken Open ports on tubing capped 7/16/2017  4:00 PM   Alcohol Cap Used Yes 7/16/2017  4:00 PM       Peripheral IV 07/15/17 Left External jugular (Active)   Site Assessment Clean, dry, & intact 7/16/2017  4:00 PM   Phlebitis Assessment 0 7/16/2017  4:00 PM   Infiltration Assessment 0 7/16/2017  4:00 PM   Dressing Status Clean, dry, & intact 7/16/2017  4:00 PM   Dressing Type Transparent;Tape 7/16/2017  4:00 PM   Hub Color/Line Status Green;Capped 7/16/2017 12:00 PM   Action Taken Open ports on tubing capped 7/16/2017 12:00 PM   Alcohol Cap Used Yes 7/16/2017 12:00 PM        Opportunity for questions and clarification was provided. MEWs of 2 prior to transfer.     Patient transported with:   Monitor  Patients medications from home  Registered Nurse  Tech

## 2017-07-16 NOTE — H&P
[address] [facility] [address]   HISTORY AND PHYSICAL       Name:  Reynaldo Green   MR#:  [id number]   :  1990   Account #:  [de-identified]        Date of Adm:  07/15/2017       HISTORY OF PRESENTING COMPLAINT:  The patient is a 24-year-  old female with a history of insulin-dependent diabetes and bipolar   disorder. She was brought to the emergency room via EMS for   evaluation of unresponsiveness. EMS responded to the patient in a   hotel where the patient was said to be staying. As per report, the   patient has been living in the hotel for the past several months. The   patient's friend reported to EMS that the patient was alert and   ambulatory this morning, but then became unresponsive prior to EMS   arrival.  The friend who called EMS also reported that the patient had   vomited earlier today. On EMS arrival, the patient was unresponsive   that morning. Oxygen saturation was 100% on room air. Heart rate   was tachycardic at above 110. Blood glucose was high on   Glucometer, and blood pressure was 72/53. The patient was unable to   give any history. When the patient was brought to the emergency   room, she was noted to have a blood glucose above 1200. The patient   was found to be in DKA, with elevated lactic acid and low pH level that   was below 7.0. The patient was started on IV fluids and a bicarbonate   drip. She was also started on an insulin drip. Because the patient was   found to have leukocytosis with no clear source, the patient was given   cefepime and Levaquin in the emergency room. Leukocytosis was   thought to be possibly due to vomiting and DKA. The patient was also   found to be in acute renal failure. No report of seizures. Of note, the   patient was admitted to the hospital at the end of 2017 for DKA and   seizures. The patient has been on Lamictal as per review of medical   records for her seizures.      At that time, a CT of the head without contrast was obtained. Currently, the patient was also found to be hypothermic. Temperature   was found to be below 92. The patient was started on a [employer]. Blood cultures were also obtained. When I saw the patient, her blood   pressure was above 100. Her blood pressure was 108/61. The patient   was, too, tachycardic at 115. Respiratory rate was 23. The patient was   saturating between 98% and 100%. The patient is unable to give any   history. The friend is not by the bedside. PAST MEDICAL HISTORY    1. Insulin-dependent diabetes. 2. Bipolar disorder. 3. Depression. 4. History of acute renal failure. 5. History of recurrent DKA. PAST SURGICAL HISTORY:   delivery, with two cesareans. MEDICATIONS   Home medications include:    1. Insulin lispro. 2. Insulin NPH 18 units subcutaneous before breakfast and dinner. 3. Lamotrigine 200 mg 2 times daily. ALLERGIES:  SULFA. FAMILY HISTORY:  Her family history is significant for hypertension,   high cholesterol, and cancer. SOCIAL HISTORY:  The patient is single. She smokes a half-pack of   cigarettes per day. There is no report of alcohol or recreational drug   use. REVIEW OF SYSTEMS:  Unable. PHYSICAL EXAMINATION   GENERAL APPEARANCE:  On examination, the patient is seen lying   in bed in no acute respiratory distress, but conscious. She is moaning. VITAL SIGNS:  Blood pressure is 123/75. Pulse 119. Respirations 22. Temperature 93.4. O2 saturation 100%. This was on oxygen . HEAD, EARS, EYES, NOSE, AND THROAT:  Atraumatic and   normocephalic. Anicteric. No pale. No jaundiced. Extraocular muscles   are intact. Pupils are bilaterally reactive, equal, and round. Poor   dentition. NECK:  No JVD. No carotid bruit. CARDIOVASCULAR:  Heart sounds 1 and 2 tachycardic. No gallop. No friction. No rub. RESPIRATORY:  Respirations are mildly tachypneic. No wheezing. No rhonchi. No rales. ABDOMEN:  Soft and nontender. Bowel sounds are normoactive. NEUROLOGIC:  Unresponsive, but moaning. She moves all   extremities. PSYCHIATRIC:  Unresponsive. SKIN:  Warm and dry. Normal skin color. EXTREMITIES:  No edema. No cyanosis. BACK:  No CVA tenderness. DIAGNOSTIC TESTS:  WBCs 28.5, hemoglobin 14, hematocrit 48.2,   platelets 711. Sodium 130, potassium 4.1, chloride 86, carbon dioxide   less than 5, glucose 1266, BUN 33, creatinine 1.81, calcium 9.5,   phosphorus 10.6, magnesium 3.1, ALT 37, AST 25, alkaline   phosphatase 210, lactic acid 4.1. Urinalysis was cloudy with a specific   gravity of 1.027, glucose greater than 1000, blood large, nitrite   negative, leukocyte esterase negative, WBCs 0 to 4, RBCs 5 to 10,   and bacteria negative. Pregnancy test negative. Toxicology was   negative. Blood cultures drawn and pending. ABG showed a pH of   6.815, pCO2 19, pO2 77, and bicarbonate 3.1. Chest x-ray showed   shallow inspirations with minimal atelectasis at the left lung base. ASSESSMENT AND PLAN    1. Unresponsive. 2. Diabetic ketoacidosis. 3. Diabetes mellitus, insulin dependent. 4. Bipolar disorder. 5. Lactic acidosis. 6. History of seizures. 7. History of recurrent diabetic ketoacidosis. 8. Dehydration. 9. Acute renal failure. 10. Leukocytosis. 11. Hypothermia. 12. Rule out systemic inflammatory response syndrome. The plan is to:    1. Admit the patient to the intensive care unit on the Hospitalist   Service. 2. For diabetic ketoacidosis, insulin drip as per protocol. 3. Serial potassium, magnesium, phosphorus, and sodium. 4. Monitor kidney function with hydration. 5. Monitor electrolytes with correction. 6. Blood cultures have been drawn. The patient received cefepime and   Levaquin in the emergency room. We will continue the patient on   Levaquin for now.   Leukocytosis is most likely multifactorial, including   diabetic ketoacidosis, vomiting, and dehydration. We will monitor   venous lactic acid as per protocol. 7. Check HbA1c. Diabetic management consult. 8. We will obtain a CT of the head without contrast.  I have discussed   this with the Emergency Department Physician. 9. N.p.o. for now. 10. Monitor the patient's respiration. 11. Seizure precautions. 12. Fall precautions. 13. Elevate head of bed. 14. Skin care precautions. 15. Deep venous thrombosis prophylaxis with heparin. 16. Avoid all nephrotoxics. 17. We will restart home medications, including Lamictal.   18. Monitor vital signs, including blood pressure and temperature as   per unit protocol. 19. Continue the patient on [address] [place] [address] for now. 20. We are unable to discuss Advance Directives at this time because   the patient is unresponsive. 21. We are also unable to do smoking cessation counseling at this time   because of the patient's current condition. 22. Recommend extensive patient education, including smoking   cessation and counseling on medication compliance to be done when   the patient is responsive and alert. 23. Further management is to depend on the patient's clinical progress,   further evaluation by the Attending Physician, and results of tests. CODE:  FULL CODE.          Yajaira Danielle MD      MEJIA / [address]   D:  07/15/2017   20:43   T:  07/15/2017   21:44   Job #:  [id number]

## 2017-07-16 NOTE — PROGRESS NOTES
TRANSFER - IN REPORT:    Verbal report received from joao(name) on Deyvi Petty  being received from ICU(unit) for routine progression of care      Report consisted of patients Situation, Background, Assessment and   Recommendations(SBAR). Information from the following report(s) SBAR, Kardex, Procedure Summary, Intake/Output, MAR and Recent Results was reviewed with the receiving nurse. Opportunity for questions and clarification was provided. Assessment completed upon patients arrival to unit and care assumed.

## 2017-07-16 NOTE — ED NOTES
TRANSFER - OUT REPORT:    Verbal report given to NANCY Drake on [facility] Greer  being transferred to ICU 15 (unit) for routine progression of care       Report consisted of patients Situation, Background, Assessment and   Recommendations(SBAR). Information from the following report(s) ED Summary was reviewed with the receiving nurse. Lines:   Venous Access Device 07/15/17 (Active)       Peripheral IV 20/84/00 Right Basilic (Active)       Peripheral IV 07/15/17 Left External jugular (Active)   Site Assessment Clean, dry, & intact 7/15/2017  7:11 PM   Phlebitis Assessment 0 7/15/2017  7:11 PM   Infiltration Assessment 0 7/15/2017  7:11 PM   Dressing Status Clean, dry, & intact 7/15/2017  7:11 PM   Dressing Type Transparent 7/15/2017  7:11 PM   Hub Color/Line Status Green 7/15/2017  7:11 PM        Opportunity for questions and clarification was provided.       Patient transported with:   Monitor  Registered Nurse

## 2017-07-16 NOTE — PROGRESS NOTES
0008: Pt [facility]. Placed in bed and on monitor. ST, pt drowsy but oriented. Primary Nurse Rand Gold RN and Kenney Mccall RN performed a dual skin assessment on this patient No impairment noted  Jimmy score is 17.     0030: when doing munoz care, noted large amounts of \"cottage cheese\" like discharge in vaginal/labial area. Pt stated that she has had a yeast infection for two weeks. 0100: Spoke with Dr. Marie Rincon concerning pt condition. New orders for maintenance fluids, ABG in 2 hours, electrolyte replacement, and treatment for yeast infection received. 0200: notified Dr. Marie Rincon of abnormal lab results. K 2.6, CO2 11, and Lactic Acid 8.0 up from 5.4. Pt already receiving electrolyte replacements. 0300: Called Dr. Marie Rincon to update with recent ABG results. New order to D/C bicarb drip received.

## 2017-07-16 NOTE — PROGRESS NOTES
Primary Nurse Florin Cummings RN and Nella Livingston, RN performed a dual skin assessment on this patient No impairment noted  Jimmy score is 18. Scratch noted on patient's right outer knee.  Patient has multiple tattoos and piercings

## 2017-07-17 NOTE — PROGRESS NOTES
Problem: Diabetes Self-Management  Goal: *Monitoring blood glucose, interpreting and using results  Identify recommended blood glucose targets and personal targets. Outcome: Progressing Towards Goal  Nurse showed pt how to check blood sugar at home. Pt understood verbally.

## 2017-07-17 NOTE — CDMP QUERY
Query #4    Dear Hospitalists,    Please clarify if this patient is being treated/managed for:    =>Sepsis (POA) in the setting of DKA and yeast infection requiring IVF, IV antibiotics and lab monitoring  =>Other Explanation of clinical findings  =>Unable to Determine (no explanation of clinical findings)    The medical record reflects the following clinical findings, treatment, and risk factors:    Risk Factors: DKA, hx bipolar  Clinical Indicators: wbc 28.5 , bands 4, UA+/culture-neg, LA 8.0, yeast infection  Treatment: lab monitoring, NS bolus and infusion, Miconazole, Maxipime/Levaquin/Zosyn    Please clarify and document your clinical opinion in the progress notes and discharge summary including the definitive and/or presumptive diagnosis, (suspected or probable), related to the above clinical findings. Please include clinical findings supporting your diagnosis.     Thank You  Hardik Reyes,MSN,BSN,RN,[facility]  [phone]

## 2017-07-17 NOTE — CDMP QUERY
Query #3    Dear Hospitalists,    Patient is noted to have a BMI of 18.42. Please clarify if this patient is:     =>Underweight  =>Cachexia  =>Failure to Thrive  =>Other Explanation of clinical findings  =>Unable to Determine (no explanation of clinical findings)    The medical record reflects the following clinical findings, treatment, and risk factors:    Presentation- BMI 18.42, Ht: 5' 5\" (1.651 m),  Wt: 50.2 kg (110 lb 10.7 oz)    Please clarify and document your clinical opinion in the progress notes and discharge summary including the definitive and/or presumptive diagnosis, (suspected or probable), related to the above clinical findings. Please include clinical findings supporting your diagnosis.     Thank You  Hardik Reyes,MSN,BSN,RN,[facility]  [phone]

## 2017-07-17 NOTE — CDMP QUERY
Query #2    Dear Hospitalists,    Please clarify if this patient is being treated/managed for:    =>Hypokalemia in the setting of DKA with Potassium 2.2 requiring IV KCL and lab monitoring  =>Other Explanation of clinical findings  =>Unable to Determine (no explanation of clinical findings)    The medical record reflects the following clinical findings, treatment, and risk factors:    Risk Factors: adm with DKA  Clinical Indicators: Potassium 2.2  Treatment: IV KCL and lab monitoring    Please clarify and document your clinical opinion in the progress notes and discharge summary including the definitive and/or presumptive diagnosis, (suspected or probable), related to the above clinical findings. Please include clinical findings supporting your diagnosis.     Thank You  Hardik Reyes,MSN,BSN,RN,[facility]  [phone]

## 2017-07-17 NOTE — PROGRESS NOTES
Hospitalist Progress Note  Evan Rodney MD  Office: [phone]  Cell: [phone]      Date of Service:  2017  NAME:  Jeannette Lin  :  1990  MRN:  [id number]      Admission Summary:    23year old female with history of DM-1, bipolar disorder, seizure disorder presented on 7/15/17 with unresponsiveness. She was found to be in DKA. Interval history / Subjective:     Tired this morning, not very cooperative with interview, but woke up after repeated prompting and sternal rub. She reports only minimal compliance to her insulin regimen and more compliance, but not complete compliance to her antiepileptic regimen. She will not divulge why she hasn't been keeping up with this. She is living in a hotel currently and became tearful when she talked about it. Assessment & Plan:     1. DKA, DM-1   - likely secondary to non-adherence, non-compliance with insulin regimen   - was on insulin drip, gap closed now, transition to NPH (reduced to 10 units BID from prior 18 units) due to poor appetite   - continue IV fluid hydration   - DTC consulted   - diabetic diet   - may be transferred to remote telemetry    2. Seizure disorder   - unclear if her unresponsiveness if secondary to seizure in the setting of poor compliance with anti-epileptics; she was diagnosed with hyperglycemic seizure on previous hospitalization as well   - resume home Lamictal    3. Leukocytosis   - suspect this is reactive secondary to volume depletion   - stop antibiotics for now   - resolved    4. Lactic acidosis:from dka    -Continue iv fluid. Expect improvement. Pt clinically stable. -Will repeat lab      5. Vaginal yeast infection   - started on miconazole suppository    6.   Bipolar disorder   - she has not followed with a behavioral health counselor \"in a while\"   - would benefit from outpatient follow-up    Code status: FULL  DVT prophylaxis: heparin    Care Plan discussed with: Patient/Family and Nurse  Disposition: Home w/Family     Hospital Problems  Date Reviewed: 7/15/2017          Codes Class Noted POA    Hypothermia ICD-10-CM: T68. XXXA  ICD-9-CM: 991.6  7/15/2017 Unknown        DKA (diabetic ketoacidoses) ([place] [address] ICD-10-CM: E13.10  ICD-9-CM: 250.10  5/20/2017 Yes        Acute renal failure (ARF) ([facility] ICD-10-CM: N17.9  ICD-9-CM: 584.9  4/12/2017 Yes        * (Principal)DKA, type 1 ([place] [address].) ICD-10-CM: E10.10  ICD-9-CM: 250.13  3/11/2017 Yes        Dehydration ICD-10-CM: E86.0  ICD-9-CM: 276.51  2/21/2017 Yes        Leukocytosis ICD-10-CM: G36.085  ICD-9-CM: 288.60  2/21/2017 Yes        Lactic acidemia ICD-10-CM: E87.2  ICD-9-CM: 276.2  2/21/2017 Yes        SIRS (systemic inflammatory response syndrome) ([place] [address].) ICD-10-CM: R65.10  ICD-9-CM: 995.90  2/21/2017 Yes        Bipolar disorder ([place] [address].) ICD-10-CM: F31.9  ICD-9-CM: 296.80  Unknown Yes                Review of Systems:   A comprehensive review of systems was negative except for that written in the HPI. Vital Signs:    Last 24hrs VS reviewed since prior progress note. Most recent are:  Visit Vitals    /82    Pulse 93    Temp 98.5 °F (36.9 °C)    Resp 16    Ht 5' 5\" (1.651 m)    Wt 50.2 kg (110 lb 10.7 oz)    SpO2 98%    BMI 18.42 kg/m2         Intake/Output Summary (Last 24 hours) at 07/17/17 1404  Last data filed at 07/17/17 1353   Gross per 24 hour   Intake          5115.62 ml   Output             2695 ml   Net          2420.62 ml        Physical Examination:             Constitutional:  No acute distress, cooperative, pleasant    ENT:  Oral mucous moist, oropharynx benign. Neck supple,    Resp:  CTA bilaterally. No wheezing/rhonchi/rales. No accessory muscle use   CV:  Regular rhythm, normal rate, no murmurs, gallops, rubs    GI:  Soft, non distended, non tender.  normoactive bowel sounds, no hepatosplenomegaly     Musculoskeletal:  No edema, warm, 2+ pulses throughout    Neurologic: Moves all extremities. AAOx3, CN II-XII reviewed     Psych:  Not anxious or agitated, fair insight, tearful  Skin:  Good turgor, no rashes or ulcers       Data Review:    Review and/or order of clinical lab test      Labs:     Recent Labs      07/17/17   0235  07/16/17   0648   WBC  6.1  9.0   HGB  10.0*  11.0*   HCT  29.5*  32.0*   PLT  147*  193     Recent Labs      07/17/17   0230  07/16/17   1053  07/16/17   0648  07/16/17   0031   NA  135*  143  144  149*   K  3.7  3.7  4.4  2.6*   CL  103  110*  112*  112*   CO2  22  26  22  11*   BUN  8  13  16  21*   CREA  1.00  1.07*  1.23*  1.46*   GLU  395*  122*  176*  278*   CA  8.5  8.3*  8.2*  8.3*   MG  1.7  2.2  2.1  1.3*   PHOS  2.2*   --   1.8*  1.8*     Recent Labs      07/15/17   1804   SGOT  25   ALT  37   AP  210*   TBILI  0.6   TP  7.9   ALB  3.4*   GLOB  4.5*     No results for input(s): INR, PTP, APTT in the last 72 hours. No lab exists for component: INREXT, INREXT   No results for input(s): FE, TIBC, PSAT, FERR in the last 72 hours. No results found for: FOL, RBCF   No results for input(s): PH, PCO2, PO2 in the last 72 hours. No results for input(s): CPK, CKNDX, TROIQ in the last 72 hours.     No lab exists for component: CPKMB  Lab Results   Component Value Date/Time    Cholesterol, total 324 07/12/2017 07:55 PM    HDL Cholesterol 50 07/12/2017 07:55 PM    LDL,Direct 181 01/04/2017 07:10 PM    LDL, calculated 218.4 07/12/2017 07:55 PM    Triglyceride 278 07/12/2017 07:55 PM    CHOL/HDL Ratio 6.5 07/12/2017 07:55 PM     Lab Results   Component Value Date/Time    Glucose (POC) 149 07/17/2017 11:03 AM    Glucose (POC) 148 07/17/2017 09:01 AM    Glucose (POC) 429 07/17/2017 06:12 AM    Glucose (POC) 293 07/16/2017 09:28 PM    Glucose (POC) 158 07/16/2017 03:25 PM     Lab Results   Component Value Date/Time    Color YELLOW/STRAW 07/15/2017 06:04 PM    Appearance CLOUDY 07/15/2017 06:04 PM    Specific gravity 1.027 07/15/2017 06:04 PM    Specific gravity 1.020 12/29/2012 02:00 PM    pH (UA) 5.0 07/15/2017 06:04 PM    Protein 30 07/15/2017 06:04 PM    Glucose >1000 07/15/2017 06:04 PM    Ketone >80 07/15/2017 06:04 PM    Bilirubin NEGATIVE  07/15/2017 06:04 PM    Urobilinogen 0.2 07/15/2017 06:04 PM    Nitrites NEGATIVE  07/15/2017 06:04 PM    Leukocyte Esterase NEGATIVE  07/15/2017 06:04 PM    Epithelial cells FEW 07/15/2017 06:04 PM    Bacteria NEGATIVE  07/15/2017 06:04 PM    WBC 0-4 07/15/2017 06:04 PM    RBC 5-10 07/15/2017 06:04 PM         Medications Reviewed:     Current Facility-Administered Medications   Medication Dose Route Frequency    miconazole (MICOTIN) 200 mg vaginal suppository 200 mg  200 mg Vaginal QHS    nystatin (MYCOSTATIN) 100,000 unit/gram ointment   Topical TID    insulin NPH (NOVOLIN N, HUMULIN N) injection 10 Units  10 Units SubCUTAneous ACB&D    dextrose 5 % - 0.45% NaCl infusion  50 mL/hr IntraVENous CONTINUOUS    insulin lispro (HUMALOG) injection   SubCUTAneous AC&HS    lamoTRIgine (LaMICtal) tablet 200 mg  200 mg Oral BID    sodium chloride (NS) flush 5-10 mL  5-10 mL IntraVENous Q8H    sodium chloride (NS) flush 5-10 mL  5-10 mL IntraVENous PRN    glucose chewable tablet 16 g  4 Tab Oral PRN    glucagon (GLUCAGEN) injection 1 mg  1 mg IntraMUSCular PRN    acetaminophen (TYLENOL) suppository 650 mg  650 mg Rectal Q4H PRN    ondansetron (ZOFRAN) injection 4 mg  4 mg IntraVENous Q4H PRN    heparin (porcine) injection 5,000 Units  5,000 Units SubCUTAneous Q8H    dextrose 10 % infusion 125-250 mL  125-250 mL IntraVENous PRN     ______________________________________________________________________  EXPECTED LENGTH OF STAY: 3d 0h  ACTUAL LENGTH OF STAY:          2                 Carito Howell MD

## 2017-07-17 NOTE — PROGRESS NOTES
Lactic acid reported from lab Aliza Waller). Lactic 6.9. Paging hospitalist team 2. Awaiting call back. Dr. Herlinda Kim returned call and I reported lactic acid 6.9. No orders given at this time. 1300:  Patient wanting to see hospitalist.  Called hospitalist in reference to lactic acid again as well. Dr. Herlinda Kim returned page immediately and stated he would be up in 10 minutes to see patient.

## 2017-07-17 NOTE — DIABETES MGMT
DTC Progress Note    Recommendations/ Comments: Note patient admitted with DKA and that patient has significant psychiatric history. Blood sugars are fluctuating, but improved today. DTC will attempt to see patient or family (if available) tomorrow. Noted that H&P states DTC consulted, but no consult received. Chart reviewed on [facility]. Patient is a 23 y.o. female with a history of Type 2 Diabetes on insulin injections: NPH 18 units BID with meals and Humalog 1:30 carb ratio with meals    A1c:   Lab Results   Component Value Date/Time    Hemoglobin A1c 14.6 07/17/2017 02:30 AM    Hemoglobin A1c 14.1 07/16/2017 05:01 AM       Recent Glucose Results: Lab Results   Component Value Date/Time     (H) 07/17/2017 03:41 PM     (H) 07/17/2017 02:30 AM    GLUCPOC 106 (H) 07/17/2017 04:48 PM    GLUCPOC 149 (H) 07/17/2017 11:03 AM    GLUCPOC 148 (H) 07/17/2017 09:01 AM        Lab Results   Component Value Date/Time    Creatinine 0.56 07/17/2017 03:41 PM     Estimated Creatinine Clearance: 121.7 mL/min (based on Cr of 0.56). Active Orders   Diet    DIET DIABETIC CONSISTENT CARB Regular        PO intake: Patient Vitals for the past 72 hrs:   % Diet Eaten   07/17/17 1353 75 %   07/17/17 0835 75 %   07/16/17 1855 50 %       Current hospital DM medication: NPH 10 units with breakfast and 10 units with dinner, Humalog for correction, normal sensitivity    Will continue to follow as needed.     Thank you  Tegan Hubbard, MS, RN, CDE

## 2017-07-17 NOTE — PROGRESS NOTES
Problem: Discharge Planning  Goal: *Discharge to safe environment  Outcome: Progressing Towards Goal  Disposition plan is to discharge home in care self

## 2017-07-17 NOTE — PROGRESS NOTES
CM reviewed chart. CM noted pt had DKA and unresponsive with history of ARF, bipolar disorder, depression, DM, and seizure disorder. CM met with pt to introduce self and role and offer support. Bedside assessment completed. CURRENT LIVING SITUATION-  Pt is currently living with a roommate in an [place]. Pt gets assistance with transportation from friends. Pt denies use of assistive devices and was independent with ADL's and IADL's. Pt in unemployed. Pt goes to [facility] for primary care and gets her medication filled at the clinic. Pt does not have insurance. Pt does not have an AMD and declines information. DISCHARGE PLANNING-  Pt denies need for assistance with medications, transportation, and follow up appointments. She has a follow up at [facility] for this week on Wednesday according to pt. CM will continue to follow as necessary. Jun Charlton RN CRM      Care Management Interventions  PCP Verified by CM: Yes  Palliative Care Consult (Criteria: CHF and RRAT>21): No  Mode of Transport at Discharge: Self  MyChart Signup: No  Physical Therapy Consult: No  Occupational Therapy Consult: No  Speech Therapy Consult: No  Current Support Network:  Other (Lives in hotel with roommate)  Plan discussed with Pt/Family/Caregiver: Yes  Discharge Location  Discharge Placement: Home

## 2017-07-17 NOTE — PROGRESS NOTES
Bedside and Verbal shift change report given to chris (oncoming nurse) by Arvind Millard (offgoing nurse). Report included the following information SBAR.

## 2017-07-17 NOTE — CDMP QUERY
Query #1    Dear Hospitalists,    Please clarify if this patient is being treated/managed for:    =>Hyponatremia in the setting of DKA requiring IVF and lab monitoring  =>Other Explanation of clinical findings  =>Unable to Determine (no explanation of clinical findings)    The medical record reflects the following clinical findings, treatment, and risk factors:    Risk Factors: adm with DKA  Clinical Indicators: Sodium 130  Treatment: NS bolus, continuous infusion, lab monitoring    Please clarify and document your clinical opinion in the progress notes and discharge summary including the definitive and/or presumptive diagnosis, (suspected or probable), related to the above clinical findings. Please include clinical findings supporting your diagnosis.     Thank You  Hardik Reyes,MSN,BSN,RN,[facility]  [phone]

## 2017-07-17 NOTE — PROGRESS NOTES
Nurse Wilfrido Yañez gave bedside report to oncoming nurse Monika Rinaldi by SBAR and Kardex. Nurse also told [place] Button to  check BS and Lactic acid at 0830.

## 2017-07-17 NOTE — PROGRESS NOTES
Regina Martinez called to report a lactic acid of 2.7. Paging Dr. Maxine Garay to report. 1556:  Dr. Maxine Garay returned call and I reported lactic. No orders at this time.

## 2017-07-18 NOTE — PROGRESS NOTES
Per Dr. Eliseo Burch by verbal order. Discharge patient after running fluid 0.45 normal saline at 150/hr until 9pm, pull IJ PICC and EJ iv's . All labs cancelled.

## 2017-07-18 NOTE — PROGRESS NOTES
Hospitalist Progress Note  Paola Martell MD  Office: [phone]      Date of Service:  2017  NAME:  Moises Bull  :  1990  MRN:  [id number]      Admission Summary:    23year old female with history of DM-1, bipolar disorder, seizure disorder presented on 7/15/17 with unresponsiveness. She was found to be in DKA. Interval history / Subjective: The patient looks much better today. Eating \"more than what I usually eat. \"  Lactate still elevated  No signs of infection       Assessment & Plan:     1. DKA, DM-1   - likely secondary to non-adherence, non-compliance with insulin regimen   - was on insulin drip, gap closed now, transition to NPH (reduced to 10 units BID from prior 18 units) due to poor appetite   - continue IV fluid hydration   - DTC consulted  -The patient is supposed to be on NPH 20 units BID but was giving herself only 10 units so that she does not run out of her insulin  -Counseled   - diabetic diet    2. Seizure disorder   - unclear if her unresponsiveness if secondary to seizure in the setting of poor compliance with anti-epileptics; she was diagnosed with hyperglycemic seizure on previous hospitalization as well   - resume home Lamictal    3. Leukocytosis   - suspect this is reactive secondary to volume depletion   - stop antibiotics for now  -Don't think the patient was in sepsis   - resolved    4. Lactic acidosis:from dka    -On IV fluids, will increase the rate  Repeat lactate tomorrow      5. Vaginal yeast infection   - started on miconazole suppository    6. Bipolar disorder   - she has not followed with a behavioral health counselor \"in a while\"   - would benefit from outpatient follow-up    7. Underweight  -Glucerna supp    8. Hypokalemia  -replace as needed    9. Probable hyponatremia  -The patient did not have hyponatremia    10.  Anemia  -Outpatient work up as the patient wants to get discharged today    11. Probable peripheral neuropathy sec to uncontrolled DM  -Outpatient follow up with PMD    Diabetic diet    Diabetic diet  Code status: FULL  DVT prophylaxis: heparin    Plan: I offered the patient to get IV fluids till tomorrow specially with elevated lactate. She has an appointment with her PMD tomorrow and does not want to miss. Will give her IV fluids now till tonight 9pm and then Discharge    Care Plan discussed with: Patient/Family and Nurse  Disposition: Home w/Family     Hospital Problems  Date Reviewed: 7/15/2017          Codes Class Noted POA    Hypothermia ICD-10-CM: T68. XXXA  ICD-9-CM: 991.6  7/15/2017 Unknown        DKA (diabetic ketoacidoses) ([facility] ICD-10-CM: E13.10  ICD-9-CM: 250.10  5/20/2017 Yes        Acute renal failure (ARF) ([facility] ICD-10-CM: N17.9  ICD-9-CM: 584.9  4/12/2017 Yes        * (Principal)DKA, type 1 ([facility] ICD-10-CM: E10.10  ICD-9-CM: 250.13  3/11/2017 Yes        Dehydration ICD-10-CM: E86.0  ICD-9-CM: 276.51  2/21/2017 Yes        Leukocytosis ICD-10-CM: A36.266  ICD-9-CM: 288.60  2/21/2017 Yes        Lactic acidemia ICD-10-CM: E87.2  ICD-9-CM: 276.2  2/21/2017 Yes        SIRS (systemic inflammatory response syndrome) ([facility] ICD-10-CM: R65.10  ICD-9-CM: 995.90  2/21/2017 Yes        Bipolar disorder ([facility] ICD-10-CM: F31.9  ICD-9-CM: 296.80  Unknown Yes                Review of Systems:   A comprehensive review of systems was negative except for that written in the HPI. Vital Signs:    Last 24hrs VS reviewed since prior progress note.  Most recent are:  Visit Vitals    /85 (BP 1 Location: Right arm, BP Patient Position: At rest)    Pulse 80    Temp 98.7 °F (37.1 °C)    Resp 14    Ht 5' 5\" (1.651 m)    Wt 50.2 kg (110 lb 10.7 oz)    SpO2 100%    BMI 18.42 kg/m2         Intake/Output Summary (Last 24 hours) at 07/18/17 0958  Last data filed at 07/18/17 0351   Gross per 24 hour   Intake              200 ml   Output             2150 ml   Net            -1950 ml Physical Examination:             Constitutional:  No acute distress, cooperative, pleasant    ENT:  Oral mucous moist, oropharynx benign. Neck supple,    Resp:  CTA bilaterally. No wheezing/rhonchi/rales. No accessory muscle use   CV:  Regular rhythm, normal rate, no murmurs, gallops, rubs    GI:  Soft, non distended, non tender. normoactive bowel sounds, no hepatosplenomegaly     Musculoskeletal:  No edema, warm, 2+ pulses throughout    Neurologic:  Moves all extremities. AAOx3, CN II-XII reviewed     Psych:  Not anxious or agitated, fair insight, tearful  Skin:  Good turgor, no rashes or ulcers       Data Review:    Review and/or order of clinical lab test      Labs:     Recent Labs      07/18/17   0359  07/17/17   0235   WBC  4.5  6.1   HGB  9.1*  10.0*   HCT  26.9*  29.5*   PLT  106*  147*     Recent Labs      07/18/17   0359  07/17/17   1541  07/17/17   0230  07/16/17   1053  07/16/17   0648  07/16/17   0031   NA  141  139  135*  143  144  149*   K  3.4*  2.9*  3.7  3.7  4.4  2.6*   CL  109*  106  103  110*  112*  112*   CO2  24  25  22  26  22  11*   BUN  6  6  8  13  16  21*   CREA  0.57  0.56  1.00  1.07*  1.23*  1.46*   GLU  193*  105*  395*  122*  176*  278*   CA  8.0*  8.1*  8.5  8.3*  8.2*  8.3*   MG   --    --   1.7  2.2  2.1  1.3*   PHOS   --    --   2.2*   --   1.8*  1.8*     Recent Labs      07/15/17   1804   SGOT  25   ALT  37   AP  210*   TBILI  0.6   TP  7.9   ALB  3.4*   GLOB  4.5*     No results for input(s): INR, PTP, APTT in the last 72 hours. No lab exists for component: INREXT, INREXT   No results for input(s): FE, TIBC, PSAT, FERR in the last 72 hours. No results found for: FOL, RBCF   No results for input(s): PH, PCO2, PO2 in the last 72 hours. No results for input(s): CPK, CKNDX, TROIQ in the last 72 hours.     No lab exists for component: CPKMB  Lab Results   Component Value Date/Time    Cholesterol, total 324 07/12/2017 07:55 PM    HDL Cholesterol 50 07/12/2017 07:55 PM LDL,Direct 181 01/04/2017 07:10 PM    LDL, calculated 218.4 07/12/2017 07:55 PM    Triglyceride 278 07/12/2017 07:55 PM    CHOL/HDL Ratio 6.5 07/12/2017 07:55 PM     Lab Results   Component Value Date/Time    Glucose (POC) 246 07/18/2017 06:14 AM    Glucose (POC) 186 07/17/2017 09:47 PM    Glucose (POC) 106 07/17/2017 04:48 PM    Glucose (POC) 149 07/17/2017 11:03 AM    Glucose (POC) 148 07/17/2017 09:01 AM     Lab Results   Component Value Date/Time    Color YELLOW/STRAW 07/15/2017 06:04 PM    Appearance CLOUDY 07/15/2017 06:04 PM    Specific gravity 1.027 07/15/2017 06:04 PM    Specific gravity 1.020 12/29/2012 02:00 PM    pH (UA) 5.0 07/15/2017 06:04 PM    Protein 30 07/15/2017 06:04 PM    Glucose >1000 07/15/2017 06:04 PM    Ketone >80 07/15/2017 06:04 PM    Bilirubin NEGATIVE  07/15/2017 06:04 PM    Urobilinogen 0.2 07/15/2017 06:04 PM    Nitrites NEGATIVE  07/15/2017 06:04 PM    Leukocyte Esterase NEGATIVE  07/15/2017 06:04 PM    Epithelial cells FEW 07/15/2017 06:04 PM    Bacteria NEGATIVE  07/15/2017 06:04 PM    WBC 0-4 07/15/2017 06:04 PM    RBC 5-10 07/15/2017 06:04 PM         Medications Reviewed:     Current Facility-Administered Medications   Medication Dose Route Frequency    0.9% sodium chloride infusion  75 mL/hr IntraVENous CONTINUOUS    nystatin (MYCOSTATIN) 100,000 unit/gram ointment   Topical TID    insulin NPH (NOVOLIN N, HUMULIN N) injection 10 Units  10 Units SubCUTAneous ACB&D    insulin lispro (HUMALOG) injection   SubCUTAneous AC&HS    lamoTRIgine (LaMICtal) tablet 200 mg  200 mg Oral BID    sodium chloride (NS) flush 5-10 mL  5-10 mL IntraVENous Q8H    sodium chloride (NS) flush 5-10 mL  5-10 mL IntraVENous PRN    glucose chewable tablet 16 g  4 Tab Oral PRN    glucagon (GLUCAGEN) injection 1 mg  1 mg IntraMUSCular PRN    acetaminophen (TYLENOL) suppository 650 mg  650 mg Rectal Q4H PRN    ondansetron (ZOFRAN) injection 4 mg  4 mg IntraVENous Q4H PRN    heparin (porcine) injection 5,000 Units  5,000 Units SubCUTAneous Q8H    dextrose 10 % infusion 125-250 mL  125-250 mL IntraVENous PRN     ______________________________________________________________________  EXPECTED LENGTH OF STAY: 3d 0h  ACTUAL LENGTH OF STAY:          Mayur Ragsdale MD

## 2017-07-18 NOTE — PROGRESS NOTES
Bedside shift change report given to Fabi Gardner (oncoming nurse) by Dorann Alpers  (offgoing nurse). Report included the following information SBAR and Kardex.

## 2017-07-18 NOTE — ROUTINE PROCESS
Bedside and Verbal shift change report given to Hua Phoenix RN (oncoming nurse) by Alf Garcia RN (offgoing nurse). Report included the following information SBAR, Kardex, Intake/Output, MAR and Recent Results.

## 2017-07-18 NOTE — DIABETES MGMT
[facility]    Elevated A1C Visit Note    Recommendations/ Comments: Admitted with DKA. Transitioned form gtt on 7/16/2017. Curently on NPH 10 units BID and lispro correction scale, normal sensitivity. BG's are now in range. During our conversation she reported that her left foot turns inward and that she cannot flex it. It drags and has caused her to fall a number of times. She states that she fell recently and broke her collar bone. Note that she walks every where because she does not have a car. I spoke to her nurse about this. He will inform her MD.    Patient is a 23 y.o. female with known DM on NPH 18 units BID and Humalog scale, 1 units/ 30 grams. She is homeless, living in a hotel with her roommate. She goes to [facility] in [place] for care. She states that she had become ill and slept a long time and so missed taking her insuln. She also states that she tests ehr BG 3x/da and that she normally keeps her BG's high - 200 - 300's. She is very fearful of hypoglycemia because it triggers seizures.     Previous admissions: 2/21/2017, 3/11/2017, 4/12/2017, 5/20/2017 All admissions were for DKA  Seen in consult by DTC on 2/23/2017 and 5/22/2107    A1c:   Lab Results   Component Value Date/Time    Hemoglobin A1c 14.6 07/17/2017 02:30 AM    Hemoglobin A1c 14.1 07/16/2017 05:01 AM       Recent Glucose Results:   Lab Results   Component Value Date/Time     (H) 07/18/2017 03:59 AM     (H) 07/17/2017 03:41 PM    GLUCPOC 246 (H) 07/18/2017 06:14 AM    GLUCPOC 186 (H) 07/17/2017 09:47 PM    GLUCPOC 106 (H) 07/17/2017 04:48 PM        Lab Results   Component Value Date/Time    Creatinine 0.57 07/18/2017 03:59 AM       Active Orders   Diet    DIET DIABETIC CONSISTENT CARB Regular          Assessed and instructed patient on the following:    interpretation of lab results, blood sugar goals, complications of diabetes mellitus, insulin adjustments, illness management, SMBG skills and nutrition  Discussed DKA at length including causes, prevention, ketone testing and action to take. Provided patient with the following: [x]          Diabetes Self-Care Guide               [x]          DKA handout               [x]          Outpatient DTC contact number          Patient to follow up with [facility] after discharge. Will continue to follow as needed. Thank you.   Umesh Medrano RN, CDE

## 2017-07-18 NOTE — DISCHARGE SUMMARY
Discharge Summary       PATIENT ID: César Castellanos  MRN: [id number]   YOB: 1990    DATE OF ADMISSION: 7/15/2017  5:49 PM    DATE OF DISCHARGE: 7/18/2017   PRIMARY CARE PROVIDER: PROVIDER UNKNOWN     ATTENDING PHYSICIAN: Dr Jessica Mackenzie  DISCHARGING PROVIDER: Jessica Mackenzie MD    To contact this individual call [phone] and ask the  to page. If unavailable ask to be transferred the Adult Hospitalist Department. CONSULTATIONS: IP CONSULT TO HOSPITALIST    PROCEDURES/SURGERIES: * No surgery found *    ADMITTING [address] [place] [address] COURSE:   1.  DKA, DM-1   - likely secondary to non-adherence, non-compliance with insulin regimen   - was on insulin drip, gap closed now, transition to NPH (reduced to 10 units BID from prior 18 units) due to poor appetite   - continue IV fluid hydration   - DTC consulted  -The patient is supposed to be on NPH 20 units BID but was giving herself only 10 units so that she does not run out of her insulin  -Counseled   - diabetic diet    2. Seizure disorder   - unclear if her unresponsiveness if secondary to seizure in the setting of poor compliance with anti-epileptics; she was diagnosed with hyperglycemic seizure on previous hospitalization as well   - resume home Lamictal    3. Leukocytosis   - suspect this is reactive secondary to volume depletion   - stop antibiotics for now  -Don't think the patient was in sepsis   - resolved    4. Lactic acidosis:from dka    -On IV fluids, will increase the rate  Repeat lactate tomorrow      5. Vaginal yeast infection   - started on miconazole suppository    6. Bipolar disorder   - she has not followed with a behavioral health counselor \"in a while\"   - would benefit from outpatient follow-up    7. Underweight  -Glucerna supp    8. Hypokalemia  -replace as needed    9. Probable hyponatremia  -The patient did not have hyponatremia    10. Anemia  -Outpatient work up as the patient wants to get discharged today    6. Probable peripheral neuropathy sec to uncontrolled DM  -Outpatient follow up with PMD    Diabetic diet        DISCHARGE DIAGNOSES / PLAN:      1. DKA  2. Anemia  3. Peripheral neuropathy       PENDING TEST RESULTS:   At the time of discharge the following test results are still pending: none    FOLLOW UP APPOINTMENTS:    Follow-up Information     Follow up With Details Comments Contact Info    Provider Unknown   Patient not available to ask      Free clinic  On 7/19/2017             ADDITIONAL CARE RECOMMENDATIONS:   Blood sugar checks twice daily  Drink plenty of fluids  If symptoms worsen please come to the ER  Expect to have a blood work for your anemia  Please let your PMD know about your left foot    DIET: Diabetic Diet    ACTIVITY: Activity as tolerated      DISCHARGE MEDICATIONS:  Current Discharge Medication List      CONTINUE these medications which have NOT CHANGED    Details   insulin NPH (NOVOLIN N, HUMULIN N) 100 unit/mL injection 18 Units by SubCUTAneous route Before breakfast and dinner. Qty: 3 Vial, Refills: 2      lamoTRIgine (LAMICTAL) 200 mg tablet Take 1 Tab by mouth two (2) times a day. Qty: 60 Tab, Refills: 1      insulin lispro (HUMALOG) 100 unit/mL injection by SubCUTAneous route. 1 unit per 30 carbs      insulin syringe-needle U-100 0.3 mL 31 gauge x 15/64\" syrg 1 Each by SubCUTAneous route five (5) times daily. Follow-up needed for refills  Qty: 150 Pen Needle, Refills: 0               NOTIFY YOUR PHYSICIAN FOR ANY OF THE FOLLOWING:   Fever over 101 degrees for 24 hours. Chest pain, shortness of breath, fever, chills, nausea, vomiting, diarrhea, change in mentation, falling, weakness, bleeding. Severe pain or pain not relieved by medications. Or, any other signs or symptoms that you may have questions about.     DISPOSITION:   x Home With:   OT  PT  HH  RN       Long term SNF/Inpatient Rehab    Independent/assisted living    Hospice    Other:       PATIENT CONDITION AT DISCHARGE: Functional status    Poor     Deconditioned    x Independent      Cognition   x  Lucid     Forgetful     Dementia      Catheters/lines (plus indication)    Sampson     PICC     PEG    x None      Code status   x  Full code     DNR      PHYSICAL EXAMINATION AT DISCHARGE:  Please see progress note      CHRONIC MEDICAL DIAGNOSES:  Problem List as of 7/18/2017  Date Reviewed: 7/15/2017          Codes Class Noted - Resolved    Hypothermia ICD-10-CM: T68. XXXA  ICD-9-CM: 991.6  7/15/2017 - Present        DKA (diabetic ketoacidoses) ([facility] ICD-10-CM: E13.10  ICD-9-CM: 250.10  5/20/2017 - Present        Acute renal failure (ARF) ([facility] ICD-10-CM: N17.9  ICD-9-CM: 584.9  4/12/2017 - Present        * (Principal)DKA, type 1 ([facility] ICD-10-CM: E10.10  ICD-9-CM: 250.13  3/11/2017 - Present        ARF (acute renal failure) ([facility]) ICD-10-CM: N17.9  ICD-9-CM: 584.9  2/21/2017 - Present        Dehydration ICD-10-CM: E86.0  ICD-9-CM: 276.51  2/21/2017 - Present        Acidosis, metabolic VXO-25-JA: K53.7  ICD-9-CM: 276.2  2/21/2017 - Present        Leukocytosis ICD-10-CM: D72.829  ICD-9-CM: 288.60  2/21/2017 - Present        Acute encephalopathy ICD-10-CM: G93.40  ICD-9-CM: 348.30  2/21/2017 - Present        Lactic acidemia ICD-10-CM: E87.2  ICD-9-CM: 276.2  2/21/2017 - Present        Sinus tachycardia ICD-10-CM: R00.0  ICD-9-CM: 427.89  2/21/2017 - Present        SIRS (systemic inflammatory response syndrome) (HCC) ICD-10-CM: R65.10  ICD-9-CM: 995.90  2/21/2017 - Present        Mixed hyperlipidemia ICD-10-CM: E78.2  ICD-9-CM: 272.2  8/3/2016 - Present        Insulin pump in place ICD-10-CM: Z96.41  ICD-9-CM: V45.85  8/3/2016 - Present        Seizure disorder ([address] ICD-10-CM: G40.909  ICD-9-CM: 345.90  12/25/2015 - Present        Anxiety disorder ICD-10-CM: F41.9  ICD-9-CM: 300.00  12/25/2015 - Present        OCD (obsessive compulsive disorder) ICD-10-CM: F42.9  ICD-9-CM: 300.3  12/25/2015 - Present        DKA, type 1, not at goal [facility]) ICD-10-CM: E10.10  ICD-9-CM: 250.13  Unknown - Present    Overview Addendum 12/25/2015  8:17 PM by Cassie Tirado MD     Type 1 DM diagnosed at age 7, on insulin pump since age 9             Bipolar disorder ([facility] ICD-10-CM: F31.9  ICD-9-CM: 296.80  Unknown - Present        Partial epilepsy with impairment of consciousness ([facility] ICD-10-CM: G40.209  ICD-9-CM: 345.40  2/12/2015 - Present        RESOLVED: DKA (diabetic ketoacidoses) ([facility] ICD-10-CM: E13.10  ICD-9-CM: 250.10  12/1/2015 - 12/25/2015        RESOLVED: Fatigue ICD-10-CM: R53.83  ICD-9-CM: 780.79  Unknown - 12/25/2015        RESOLVED: Muscle pain ICD-10-CM: M79.1  ICD-9-CM: 729.1  Unknown - 12/25/2015        RESOLVED: Group B Streptococcus carrier, +RV culture, currently pregnant ICD-10-CM: O99.820  ICD-9-CM: V23.89, V02.51  5/20/2013 - 12/25/2015        RESOLVED: Active labor ICD-10-CM: Judy Roberts  ICD-9-CM: Judy Roberts  5/20/2013 - 12/25/2015        RESOLVED: Diabetes in pregnancy ICD-10-CM: O24.919  ICD-9-CM: 648.00  3/19/2013 - 12/22/2015        RESOLVED: Seizures ([facility] ICD-10-CM: R56.9  ICD-9-CM: 780.39  3/19/2013 - 12/25/2015              Greater than 44 minutes were spent with the patient on counseling and coordination of care    Signed:   Mayito Carlisle MD  7/18/2017  12:44 PM

## 2017-07-18 NOTE — PROGRESS NOTES
Critical lab lactic acid 3.6 reported by torin Ragland). Page to Dr. Arie Lawton to report. Awaiting call back.

## 2017-07-19 NOTE — DISCHARGE INSTRUCTIONS
Discharge Instructions       PATIENT ID: Moises Bull  MRN: [id number]   YOB: 1990    DATE OF ADMISSION: 7/15/2017  5:49 PM    DATE OF DISCHARGE: 7/18/2017    PRIMARY CARE PROVIDER: PROVIDER UNKNOWN     ATTENDING PHYSICIAN: Paola Martell MD  DISCHARGING PROVIDER: Paola Martell MD    To contact this individual call [phone] and ask the  to page. If unavailable ask to be transferred the Adult Hospitalist Department. DISCHARGE DIAGNOSES   DKA  Anemia  Peripheral neuropathy    CONSULTATIONS: IP CONSULT TO HOSPITALIST    PROCEDURES/SURGERIES: * No surgery found *    PENDING TEST RESULTS:   At the time of discharge the following test results are still pending: none    FOLLOW UP APPOINTMENTS:   Follow-up Information     Follow up With Details Comments Contact Info    Provider Unknown   Patient not available to ask      Free clinic  On 7/19/2017             ADDITIONAL CARE RECOMMENDATIONS:   Blood sugar checks twice daily  Drink plenty of fluids  If symptoms worsen please come to the ER  Expect to have a blood work for your anemia  Please let your PMD know about your left foot    DIET: Diabetic Diet/Drink plenty of fluids    ACTIVITY: Activity as tolerated      DISCHARGE MEDICATIONS:   See Medication Reconciliation Form    · It is important that you take the medication exactly as they are prescribed. · Keep your medication in the bottles provided by the pharmacist and keep a list of the medication names, dosages, and times to be taken in your wallet. · Do not take other medications without consulting your doctor. NOTIFY YOUR PHYSICIAN FOR ANY OF THE FOLLOWING:   Fever over 101 degrees for 24 hours. Chest pain, shortness of breath, fever, chills, nausea, vomiting, diarrhea, change in mentation, falling, weakness, bleeding. Severe pain or pain not relieved by medications. Or, any other signs or symptoms that you may have questions about.       DISPOSITION:  x  Home With:   OT PT  [facility]  RN       SNF/Inpatient Rehab/LTAC    Independent/assisted living    Hospice    Other:     CDMP Checked:   Yes x     PROBLEM LIST Updated:  Yes x       Signed:   Rex Miller MD  7/18/2017  12:42 PM

## 2017-07-19 NOTE — PROGRESS NOTES
Bedside shift change report given to CATERINA Ruelas (oncoming nurse) by Alva Pinzon (offgoing nurse). Report included the following information SBAR, Kardex, Intake/Output and MAR.

## 2017-07-22 NOTE — PROCEDURES
[address]   [address] [facility], [address]   EEG       Name:  Tanner Alvarez   MR#:  [id number]   :  1990   Account #:  [de-identified]    Date of Procedure:  2017   Date of Adm:  07/15/2017       REQUESTING PHYSICIAN:  Dr. Olivia Damian     HISTORY: The patient is a 21-year-old female who is being   evaluated after an episode of unresponsiveness. MEDICATIONS   1. Lamictal.   2. GlucaGen. 3. Zofran. 4. Heparin. DESCRIPTION: This is an 18-channel EEG performed on an awake   patient. The dominant posterior background rhythm consists of   symmetric, fairly well modulated, medium voltage rhythms in the 9-10   Hz frequency range. Intermittently brief generalized, frontally dominant   1-2 second bursts of medium to high voltage delta slowing was also   noted. Drowsiness and sleep architecture was not seen. Photic   stimulation elicits a symmetric driving response. Hyperventilation did not   produce any abnormalities. EEG SUMMARY: Mildly abnormal EEG due to a mixture of slow and   fast frequencies. CLINICAL INTERPRETATION: This EEG is suggestive of a mild   generalized encephalopathic process, nonspecific in type. This may be   due to an underlying structural brain injury and/or toxic/metabolic   abnormality. No clearly lateralizing or epileptiform features were noted. Please correlate clinically.           Benita Lawton MD      AS / MS   D:  2017   10:14   T:  2017   18:23   Job #:  [id number]

## 2017-07-26 NOTE — IP AVS SNAPSHOT
[address] 
[phone] Patient: Ramiro Reagan MRN: [id number] :1990 Current Discharge Medication List  
  
CONTINUE these medications which have NOT CHANGED Dose & Instructions Dispensing Information Comments Morning Noon Evening Bedtime HumaLOG 100 unit/mL injection Generic drug:  insulin lispro Your last dose was: Your next dose is:    
   
   
 by SubCUTAneous route. 1 unit per 30 carbs Refills:  0  
     
   
   
   
  
 insulin  unit/mL injection Commonly known as:  Noelle Area Your last dose was: Your next dose is:    
   
   
 Dose:  18 Units 18 Units by SubCUTAneous route Before breakfast and dinner. Quantity:  3 Vial  
Refills:  2  
     
   
   
   
  
 lamoTRIgine 200 mg tablet Commonly known as: LaMICtal  
   
Your last dose was: Your next dose is:    
   
   
 Dose:  200 mg Take 1 Tab by mouth two (2) times a day. Quantity:  60 Tab Refills:  1

## 2017-07-26 NOTE — IP AVS SNAPSHOT
Summary of Care Report The Summary of Care report has been created to help improve care coordination. Users with access to [employer] or [address] (Web-based application) may access additional patient information including the Discharge Summary. If you are not currently a [address] user and need more information, please call the number listed below in the [place] [address] section and ask to be connected with Medical Records. Facility Information Name Address Phone [phone] [place] [phone] Patient Information Patient Name Sex  Kiah Sharp ([id number]) Female 1990 Discharge Information Admitting Provider Service Area Unit Zoey Lira MD / [address] [place] [address] Surgical Unit / [phone] Discharge Provider Discharge Date/Time Discharge Disposition Destination (none) 2017 Afternoon (Pending) AHR (none) Patient Language Language ENGLISH [13] Hospital Problems as of 2017  Reviewed: 2017  2:39 AM by Zoey Lira MD  
 None Non-Hospital Problems as of 2017  Reviewed: 2017  2:39 AM by Zoey Lira MD  
  
  
  
 Class Noted - Resolved Last Modified Active Problems DKA, type 1, not at goal [facility])  Unknown - Present 2017 by Jo Ann Thomas MD  
  Entered by Yevgeniy Hudson LPN Overview Addendum 2015  8:17 PM by Perla Mckeon MD  
   Type 1 DM diagnosed at age 7, on insulin pump since age 9 Bipolar disorder [facility])  Unknown - Present 7/15/2017 by Leda Barajas MD  
  Entered by Yevgeniy Hudson LPN   Partial epilepsy with impairment of consciousness ([facility]  2015 - Present 2017 by Jo Ann hTomas MD  
  Entered by Hero Staley MD  
  Seizure disorder [facility])  2015 - Present 2017 by Parviz Reyes Liana Cheng MD  
  Entered by Mandie Perez MD  
  Anxiety disorder  12/25/2015 - Present 2/21/2017 by Giovani Alexandra MD  
  Entered by Mandie Perez MD  
  OCD (obsessive compulsive disorder)  12/25/2015 - Present 2/21/2017 by Giovani Alexandra MD  
  Entered by Mandie Perez MD  
  Mixed hyperlipidemia  8/3/2016 - Present 2/21/2017 by Giovani Alexandra MD  
  Entered by Mandie Perez MD  
  Insulin pump in place  8/3/2016 - Present 2/21/2017 by Giovani Alexandra MD  
  Entered by Mandie Perez MD  
  ARF (acute renal failure) ([facility]  2/21/2017 - Present 2/21/2017 by Giovani Alexandra MD  
  Entered by Giovani Alexandra MD  
  Dehydration  2/21/2017 - Present 7/15/2017 by Erica Baez MD  
  Entered by Giovani Alexandra MD  
  Acidosis, metabolic  2/80/5043 - Present 2/21/2017 by Giovani Alexandra MD  
  Entered by Giovani Alexandra MD  
  Leukocytosis  2/21/2017 - Present 7/15/2017 by Erica Baez MD  
  Entered by Giovani Alexandra MD  
  Acute encephalopathy  2/21/2017 - Present 2/21/2017 by Giovani Alexandra MD  
  Entered by Giovani Alexandra MD  
  Lactic acidemia  2/21/2017 - Present 7/15/2017 by Erica Baez MD  
  Entered by Giovani Alexandra MD  
  Sinus tachycardia  2/21/2017 - Present 2/21/2017 by Giovani Alexandra MD  
  Entered by Giovani Alexandra MD  
  SIRS (systemic inflammatory response syndrome) ([facility]  2/21/2017 - Present 7/15/2017 by Erica Baez MD  
  Entered by Giovani Alexandra MD  
  Acute renal failure (ARF) ([facility]  4/12/2017 - Present 7/15/2017 by Erica Baez MD  
  Entered by Clara Strong MD  
  DKA (diabetic ketoacidoses) [facility])  5/20/2017 - Present 7/15/2017 by Erica Baez MD  
  Entered by Souleymane Hearn MD  
  Hypothermia  7/15/2017 - Present 7/15/2017 by Erica Baez MD  
  Entered by Erica Baez MD  
  
You are allergic to the following Allergen Reactions Other Medication Rash Tegaderm Sulfa (Sulfonamide Antibiotics) Hives Current Discharge Medication List  
  
CONTINUE these medications which have NOT CHANGED Dose & Instructions Dispensing Information Comments HumaLOG 100 unit/mL injection Generic drug:  insulin lispro  
 by SubCUTAneous route. 1 unit per 30 carbs Refills:  0  
   
 insulin  unit/mL injection Commonly known as:  Flores Arlene Dose:  18 Units 18 Units by SubCUTAneous route Before breakfast and dinner. Quantity:  3 Vial  
Refills:  2  
   
 lamoTRIgine 200 mg tablet Commonly known as: LaMICtal  
 Dose:  200 mg Take 1 Tab by mouth two (2) times a day. Quantity:  60 Tab Refills:  1 Current Immunizations Name Date Influenza Vaccine 1/1/2017, 12/29/2012 Pneumococcal Vaccine (Unspecified Type) 1/1/2008 Tdap 8/2/2016 Follow-up Information Follow up With Details Comments Contact Info [employer] On 8/9/2017 Please arrive at clinic at 6:45PM for 7:00 estimated appointment time [address] [phone] Provider Unknown   Patient not available to ask Discharge Instructions None Chart Review Routing History Recipient Method Report Sent By Denise Jett NP Fax: [phone] Phone: [phone] Fax Provider Comm Report Jose Maria Hanson MD [[id number]] 9/21/2011 10:33 PM 09/21/2011 Isra Haddad MD  
Fax: [phone] Phone: [phone] Fax Provider Comm Report Jose Maria Hanson MD [[id number]] 9/21/2011 10:33 PM 09/21/2011 Kehinde Olivera MD  
Phone: [phone] In Basket Note Review Jose Maria Hanson MD [[id number]] 4/17/2013  3:31 PM 04/17/2013 Violeta Blanchard MD  
Phone: [phone] In Basket Note Review Jose Maria Hanson MD [[id number]] 5/8/2013  2:25 PM 04/17/2013 Provider Unknown, MD  
Patient not available to ask [address] IP Auto Routed Notes Marilou Fleischer, MD Hanley Singer 12/1/2015  8:24 PM 12/01/2015 Provider Unknown, MD  
Patient not available to ask [address] IP Auto Routed Notes Yoko Valle MD [[id number]] 12/4/2015 12:58 PM 12/04/2015 Mable Allen MD  
Phone: [phone] In Basket IP Auto Routed Notes Liat Rodriguez MD [[id number]] 2/21/2017  7:01 PM 02/21/2017 Provider Unknown, MD  
Patient not available to ask [address] IP Auto Routed Notes Michelet Gutierrez MD [[id number]] 3/11/2017  3:25 PM 03/11/2017 Provider Unknown, MD  
Patient not available to ask [address] IP Auto Routed Notes Michelet Gutierrez MD [[id number]] 3/11/2017  3:34 PM 03/11/2017 Provider Unknown, MD  
Patient not available to ask [address] IP Auto Routed Notes Michelet Gutierrez MD [[id number]] 3/11/2017  4:15 PM 03/11/2017 Provider Unknown, MD  
Patient not available to ask [address] IP Auto Routed Notes MD Cheyenne Salcedo 3/15/2017 10:01 PM 03/15/2017 Provider Unknown, MD  
Patient not available to ask [address] IP Auto Routed Notes Jordan Medina MD [[id number]] 4/12/2017  1:15 PM 04/12/2017 Provider Unknown, MD  
Patient not available to ask [address] IP Auto Routed Notes Ozzy Urbina MD [[id number]] 4/14/2017  2:18 PM 04/14/2017 Provider Unknown, MD  
Patient not available to ask [address] IP Auto Routed Notes Marilou Fleischer, MD Hanley Singer 5/21/2017 12:10 AM 05/21/2017 Provider Unknown, MD  
Patient not available to ask [address] IP Auto Routed Notes Yoko Valle MD [[id number]] 5/22/2017 10:30 AM 05/22/2017 Provider Unknown, MD  
Patient not available to ask [address] IP Auto Routed Codey Erazo MD [[id number]] 7/16/2017 10:03 PM 07/16/2017 Provider Unknown, MD  
Patient not available to ask [address] IP Auto Routed Notes Khadra Alford MD [[id number]] 7/18/2017 12:45 PM 07/18/2017 Provider Unknown, MD  
Patient not available to ask [address] IP Auto Routed Notes Khadra Alford MD [[id number]] 7/18/2017 12:46 PM 07/18/2017 Provider Unknown, MD  
Patient not available to ask [address] IP Auto Routed Notes Oanh Pradhan MD [[id number]] 7/27/2017  7:32 AM 07/27/2017 Provider Unknown, MD  
Patient not available to ask [address] IP Auto Routed Angie Mckeon MD [[id number]] 7/27/2017  7:16 PM 07/27/2017

## 2017-07-26 NOTE — IP AVS SNAPSHOT
[address] 
[phone] Patient: Kvng Alvarado MRN: [id number] :1990 You are allergic to the following Allergen Reactions Other Medication Rash Tegaderm Sulfa (Sulfonamide Antibiotics) Hives Recent Documentation Height Weight Breastfeeding? BMI OB Status Smoking Status 1.676 m 52.2 kg No 18.56 kg/m2 Having regular periods Current Every Day Smoker Unresulted Labs Order Current Status CULTURE, BLOOD, PAIRED Preliminary result Emergency Contacts Name Discharge Info Relation Home Work Mobile Maryanne Yan  Parent [1] [phone] Supa Brown     [phone] About your hospitalization You were admitted on:  2017 You last received care in the:  [place] [address] You were discharged on:  2017 Unit phone number:  [phone] Why you were hospitalized Your primary diagnosis was:  Dka, Type 1 (Hcc) Providers Seen During Your Hospitalizations Provider Role Specialty Primary office phone Ruth Osler, MD Attending Provider Emergency Medicine [phone] Anju Townsend MD Attending Provider [facility] [phone] Kaylynn Lei MD Attending Provider Internal Medicine [phone] Your Primary Care Physician (PCP) Primary Care Physician Office Phone Office Fax UNKNOWN, PROVIDER ** None ** ** None ** Follow-up Information Follow up With Details Comments Contact Info [facility] On 2017 Please arrive at clinic at 6:45PM for 7:00 estimated appointment time [address] [phone] Provider Unknown   Patient not available to ask Current Discharge Medication List  
  
CONTINUE these medications which have NOT CHANGED Dose & Instructions Dispensing Information Comments Morning Noon Evening Bedtime HumaLOG 100 unit/mL injection Generic drug:  insulin lispro Your last dose was: Your next dose is:    
   
   
 by SubCUTAneous route. 1 unit per 30 carbs Refills:  0  
     
   
   
   
  
 insulin  unit/mL injection Commonly known as:  Sebastien Thomas Your last dose was: Your next dose is:    
   
   
 Dose:  18 Units 18 Units by SubCUTAneous route Before breakfast and dinner. Quantity:  3 Vial  
Refills:  2  
     
   
   
   
  
 lamoTRIgine 200 mg tablet Commonly known as: LaMICtal  
   
Your last dose was: Your next dose is:    
   
   
 Dose:  200 mg Take 1 Tab by mouth two (2) times a day. Quantity:  60 Tab Refills:  1 Discharge Instructions None Discharge Orders None Introducing [facility]! Miguel Doherty introduces [employer] patient portal. Now you can access parts of your medical record, email your doctor's office, and request medication refills online. 1. In your internet browser, go to [email or web address] 2. Click on the First Time User? Click Here link in the Sign In box. You will see the New Member Sign Up page. 3. Enter your [employer] Access Code exactly as it appears below. You will not need to use this code after youve completed the sign-up process. If you do not sign up before the expiration date, you must request a new code. · [employer] Access Code: [email or web address] Expires: 8/27/2017  4:39 PM 
 
4. Enter the last four digits of your Social Security Number (xxxx) and Date of Birth (mm/dd/yyyy) as indicated and click Submit. You will be taken to the next sign-up page. 5. Create a [employer] ID. This will be your [employer] login ID and cannot be changed, so think of one that is secure and easy to remember. 6. Create a [employer] password. You can change your password at any time. 7. Enter your Password Reset Question and Answer. This can be used at a later time if you forget your password. 8. Enter your e-mail address. You will receive e-mail notification when new information is available in [address] 9. Click Sign Up. You can now view and download portions of your medical record. 10. Click the Download Summary menu link to download a portable copy of your medical information. If you have questions, please visit the Frequently Asked Questions section of the [employer] website. Remember, [employer] [facility] is NOT to be used for urgent needs. For medical emergencies, dial 911. Now available from your iPhone and Android! General Information Please provide this summary of care documentation to your next provider. Patient Signature:  ____________________________________________________________ Date:  ____________________________________________________________  
  
Gustavo Melendez Provider Signature:  ____________________________________________________________ Date:  ____________________________________________________________

## 2017-07-27 NOTE — PROGRESS NOTES
Admission Medication Reconciliation:    Information obtained from: patient    Significant PMH/Disease States:   Past Medical History:   Diagnosis Date    [facility] 2017    Bipolar disorder ([facility]     Contraception management     Gyn: Dr. Heber Joshi; Nexplanon placed  and removed 7/6/15 because she wanted to conceive    Depression     Diabetes in pregnancy     Diabetes type I ([facility]     Age 7; on insulin pump    HX OTHER MEDICAL     late pnc at 25 weeks    HX OTHER MEDICAL     polyhydramnios; previous pregnancy    Unspecified epilepsy without mention of intractable epilepsy     diabetic related       Chief Complaint for this Admission:    Chief Complaint   Patient presents with    High Blood Sugar    Vomiting         Allergies: Other medication and Sulfa (sulfonamide antibiotics)    Prior to Admission Medications:   Prior to Admission Medications   Prescriptions Last Dose Informant Patient Reported? Taking?   insulin NPH (NOVOLIN N, HUMULIN N) 100 unit/mL injection 2017  No Yes   Si Units by SubCUTAneous route Before breakfast and dinner. insulin lispro (HUMALOG) 100 unit/mL injection 2017  Yes Yes   Sig: by SubCUTAneous route. 1 unit per 30 carbs   lamoTRIgine (LAMICTAL) 200 mg tablet 2017  No Yes   Sig: Take 1 Tab by mouth two (2) times a day. Facility-Administered Medications: None         Comments/Recommendations: This medication history was obtained from patient; (s)he appears to be a guarded historian. An RX Query is not available. Medications added: none  Medications deleted: none  Medications amended: none    Last doses of medication: yesterday  Review of Allergies: completed    Also of note: patient says she receives her insulin from a free clinic in [address] [place] [address],[address]. Takes 20 units of nph twice daily and \"whatever I need\" for short acting. Thank you for allowing me to participate in the care of this patient.   Please contact the pharmacy () or the medication reconciliation pharmacy () with any questions.     Lorrie Charles, PharmD

## 2017-07-27 NOTE — ED NOTES
Pt asleep on stretcher, responsive to voice. NAD. VSS. IV fluids infusing. Will continue to monitor.

## 2017-07-27 NOTE — PROGRESS NOTES
TRANSFER - IN REPORT:    Verbal report received from Lena(name) on Eliza Fragoso  being received from ED(unit) for routine progression of care      Report consisted of patients Situation, Background, Assessment and   Recommendations(SBAR). Information from the following report(s) SBAR was reviewed with the receiving nurse. Opportunity for questions and clarification was provided. Assessment completed upon patients arrival to unit and care assumed.

## 2017-07-27 NOTE — WOUND CARE
WOCN Note:     New consult placed by Rn for foot. Chart shows:  Admitted for DKA; history of DM, bipolar disorder, epilepsy, OCD    Assessment:   Patient is A&O x 4, continent and mobile. Patient reports no pain. She reports she tripped and scraped her foot recently. Left foot slightly pigeon-toed with drying abrasions on dorsal surface. 1. POA, left lateral foot abrasion = 1 x 0.6 x 0.1 cm 90% moist central yellow 10% moist pink rim. Margins are flat. Slight flare of erythema to immediate periwound but foot is without erythema or edema. Appears superficial.  Cleaned with saline and Duoderm applied. Recommendations:    Left foot: Please maintain Duoderm up to one week or change as needed with soiling or rolled edges. Please use adhesive remover wipes when changing. Discussed above plan with patient.     Transition of Care: Plan to follow weekly and as needed while admitted to hospital.    Nelia Spurling, BSN, RN, [facility]  Certified Wound, Ostomy, Continence Nurse  office [phone]  pager [phone] or call  to page

## 2017-07-27 NOTE — ED NOTES
Bedside shift change report given to NANCY Paredes  (oncoming nurse) by Arti Red RN  (offgoing nurse). Report included the following information SBAR, ED Summary, MAR and Recent Results.

## 2017-07-27 NOTE — PROGRESS NOTES
Hospitalist Progress Note  Juany Bledsoe MD  Office: [phone]        Date of Service:  2017  NAME:  Mayda Kerr  :  1990  MRN:  [id number]      Admission Summary:   Maico Martinez is a 23year old female, known Type I diabetic. She presented with nausea and fatigue. She was found to be in DKA. This is the 5th admission in our system over the last 5-6 months for same. She was most recently admitted from 7/15-. Interval history / Subjective:      Patient seen in ER #7. She is weak but alert. Could not tell me much. She says she was not taking enough insulin. Could not tell me why. Asked if its due to financial reasons or if she is uninsured,she replied \"no. \"     Assessment & Plan:   DKA with uncontrolled type I DM  -Due to non adherence,non compliance. She was recently discharged on Novolin N 18 units ACBID  -Iv fluids. Had received 10 units of regular insulin Iv x1 at 1 am,I am now informed by nurse the insulin drip has never been started. MAR shows insulin gtt ordered to start at ~5am  -Check stat BMP and if anion gap closed,transfer to medical  -Diabetic diet. -DTC for education. Metabolic acidosis due to lactic acidosis  -Mx as above. Iv fluid. Expect to improve with correction of DKA    Hypokalemia:expect this will worsening with Iv insulin  -Add kcl    Hypomagnesemia:replace    Nausea and vomiting,could be from DKA/hyperglycemia or conversely she may have gastroparesis and n/v is contributing for the DKA  -Symptomatic rx for now. -Gastric emptying study tomorrow. Body mass index is 18.56 kg/(m^2).   -Underweight      Diet:diabetic  Code status: full  DVT prophylaxis: lovenox  Care Plan discussed with: patient    Discharge planning/disposition:home     Hospital Problems  Date Reviewed: 2017          Codes Class Noted POA    * (Principal)[facility] ICD-10-CM: E10.10  ICD-9-CM: 250.13  3/11/2017 Unknown Review of Systems:   A comprehensive review of systems was negative except for that written in the HPI. Physical Examination:      Last 24hrs VS reviewed since prior progress note. Most recent are:  Visit Vitals    /71    Pulse (!) 107    Temp 98.8 °F (37.1 °C)    Resp 16    Ht 5' 6\" (1.676 m)    Wt 52.2 kg (115 lb)    SpO2 100%    BMI 18.56 kg/m2           Constitutional:  No acute distress, cooperative   Eyes: Non icteric,non pallor,no erythema,discharge,PERRLA   ENT:  Oral mucous moist, oropharynx benign. Neck supple,    Resp:  CTA bilaterally. No wheezing/rhonchi/rales. No accessory muscle use   CV:  Regular rhythm, normal rate, no murmurs, gallops, rubs    GI:  Soft, non distended, non tender. normoactive bowel sounds, no hepatosplenomegaly    :  No CVA or suprapubic tenderness   Skin  :  scattered abrasions on the legs    Musculoskeletal:  No edema, warm, 2+ pulses throughout    Neurologic:  AAOx3, CN II-XII reviewed. Moves all extremities. Hematologic/Lymphatic/Immunlogic:  No jaundice nor lymph node swelling     No intake or output data in the 24 hours ending 07/27/17 0810       Data Review:    Review and/or order of clinical lab test  Review and/or order of tests in the radiology section of CPT  Review and/or order of tests in the medicine section of CPT      Labs:     Recent Labs      07/26/17 2358   WBC  19.9*   HGB  11.5   HCT  33.4*   PLT  392     Recent Labs      07/27/17 0317 07/26/17 2358   NA  143  140   K  3.6  3.2*   CL  109*  106   CO2  17*  9*   BUN  19  22*   CREA  1.03*  1.14*   GLU  109*  262*   CA  8.9  8.7   MG  1.5*   --    PHOS  2.8   --      Recent Labs      07/27/17 0317 07/26/17 2358   SGOT  18  21   ALT  25  27   AP  129*  147*   TBILI  0.4  0.4   TP  5.8*  6.3*   ALB  2.6*  2.7*   GLOB  3.2  3.6   AML  144*   --    LPSE  178   --      No results for input(s): INR, PTP, APTT in the last 72 hours.     No lab exists for component: INREXT No results for input(s): FE, TIBC, PSAT, FERR in the last 72 hours. No results found for: FOL, RBCF   No results for input(s): PH, PCO2, PO2 in the last 72 hours. No results for input(s): CPK, CKNDX, TROIQ in the last 72 hours.     No lab exists for component: CPKMB  Lab Results   Component Value Date/Time    Cholesterol, total 324 07/12/2017 07:55 PM    HDL Cholesterol 50 07/12/2017 07:55 PM    LDL,Direct 181 01/04/2017 07:10 PM    LDL, calculated 218.4 07/12/2017 07:55 PM    Triglyceride 278 07/12/2017 07:55 PM    CHOL/HDL Ratio 6.5 07/12/2017 07:55 PM     Lab Results   Component Value Date/Time    Glucose (POC) 167 07/27/2017 06:38 AM    Glucose (POC) 123 07/27/2017 04:50 AM    Glucose (POC) 136 07/27/2017 02:52 AM    Glucose (POC) 314 07/27/2017 12:53 AM    Glucose (POC) 459 07/26/2017 11:19 PM     Lab Results   Component Value Date/Time    Color YELLOW/STRAW 07/27/2017 02:54 AM    Appearance CLOUDY 07/27/2017 02:54 AM    Specific gravity 1.022 07/27/2017 02:54 AM    Specific gravity 1.020 12/29/2012 02:00 PM    pH (UA) 5.5 07/27/2017 02:54 AM    Protein 100 07/27/2017 02:54 AM    Glucose >1000 07/27/2017 02:54 AM    Ketone >80 07/27/2017 02:54 AM    Bilirubin NEGATIVE  07/15/2017 06:04 PM    Urobilinogen 0.2 07/27/2017 02:54 AM    Nitrites NEGATIVE  07/27/2017 02:54 AM    Leukocyte Esterase NEGATIVE  07/27/2017 02:54 AM    Epithelial cells FEW 07/27/2017 02:54 AM    Bacteria NEGATIVE  07/27/2017 02:54 AM    WBC 0-4 07/27/2017 02:54 AM    RBC 0-5 07/27/2017 02:54 AM         Medications Reviewed:     Current Facility-Administered Medications   Medication Dose Route Frequency    potassium chloride SR (KLOR-CON 10) tablet 40 mEq  40 mEq Oral NOW    insulin lispro (HUMALOG) injection   SubCUTAneous TIDAC    glucose chewable tablet 16 g  4 Tab Oral PRN    glucagon (GLUCAGEN) injection 1 mg  1 mg IntraMUSCular PRN    dextrose 10 % infusion 125-250 mL  125-250 mL IntraVENous PRN    sodium chloride (NS) flush 5-10 mL  5-10 mL IntraVENous Q8H    sodium chloride (NS) flush 5-10 mL  5-10 mL IntraVENous PRN    insulin regular (NOVOLIN R, HUMULIN R) 100 Units in 0.9% sodium chloride 100 mL infusion  0-50 Units/hr IntraVENous TITRATE    dextrose 5% - 0.9% NaCl with KCl 20 mEq/L infusion  200 mL/hr IntraVENous CONTINUOUS     Current Outpatient Prescriptions   Medication Sig    insulin NPH (NOVOLIN N, HUMULIN N) 100 unit/mL injection 18 Units by SubCUTAneous route Before breakfast and dinner.  lamoTRIgine (LAMICTAL) 200 mg tablet Take 1 Tab by mouth two (2) times a day.  insulin lispro (HUMALOG) 100 unit/mL injection by SubCUTAneous route. 1 unit per 30 carbs    insulin syringe-needle U-100 0.3 mL 31 gauge x 15/64\" syrg 1 Each by SubCUTAneous route five (5) times daily.  Follow-up needed for refills     ______________________________________________________________________  EXPECTED LENGTH OF STAY: - - -  ACTUAL LENGTH OF STAY:          0                 Kamini Mooney MD

## 2017-07-27 NOTE — PROGRESS NOTES
TRANSFER - IN REPORT:    Verbal report received from Gay(name) on Nicole Newman  being received from [facility](unit) for routine progression of care      Report consisted of patients Situation, Background, Assessment and   Recommendations(SBAR). Information from the following report(s) SBAR, Kardex and MAR was reviewed with the receiving nurse. Opportunity for questions and clarification was provided. Assessment completed upon patients arrival to unit and care assumed.

## 2017-07-27 NOTE — DIABETES MGMT
DTC Consult Note    Recommendations/ Comments: Noted consult for assessment of home management, however, patient was seen and educated by DTC last week. Noted home dose of NPH to be started this evening. If appropriate, please consider adding pre-meal Humalog (2 units tid ac) if patient is eating at least 50% of food on tray. Chart reviewed on [address]. Patient is a 23 y.o. female with known DM on NPH 18 units BID and Humalog scale, 1 units/ 30 grams. A1c:   Lab Results   Component Value Date/Time    Hemoglobin A1c 14.2 07/27/2017 03:17 AM    Hemoglobin A1c 14.6 07/17/2017 02:30 AM       Recent Glucose Results:   Lab Results   Component Value Date/Time     (H) 07/27/2017 08:39 AM     (H) 07/27/2017 03:17 AM     (H) 07/26/2017 11:58 PM    GLUCPOC 218 (H) 07/27/2017 11:36 AM    GLUCPOC 187 (H) 07/27/2017 09:37 AM    GLUCPOC 167 (H) 07/27/2017 08:11 AM        Lab Results   Component Value Date/Time    Creatinine 0.99 07/27/2017 08:39 AM     Estimated Creatinine Clearance: 71.6 mL/min (based on Cr of 0.99). Active Orders   Diet    DIET DIABETIC CONSISTENT CARB Regular; 2 GM NA (House Low NA)        PO intake: No data found. Current hospital DM medication: Humalog for correction, normal sensitivity, NPH 18 units BID ordered    Will continue to follow as needed.     Thank you  Marylu Ornelas, MS, RN, CDE

## 2017-07-27 NOTE — ED PROVIDER NOTES
HPI Comments: 23 y.o. female with past medical history significant for type 1 DM, ARF who presents from home via EMS with chief complaint of vomiting. Patient complains of nausea and vomiting that began earlier today. Patient states she has also been feeling fatigued. Patient states she has insulin at home.  per EMS. Patient states she was hospitalized 2 weeks ago for DKA. Patient states her ribs are sore from the vomiting and her heart is racing. Patient is unsure of her LMP. There are no other acute medical concerns at this time. Social hx: current smoker, no alcohol, no illicit drugs  PCP: PROVIDER UNKNOWN    Note written by Amber Julian, as dictated by Zabrina Brice MD 11:52 PM      The history is provided by the patient. No  was used. Past Medical History:   Diagnosis Date    ARF (acute renal failure) ([facility] [place] [address] 2017    Bipolar disorder ([facility] [place] [address]     Contraception management     Gyn: Dr. Jim Arambula;  Nexplanon placed  and removed 7/6/15 because she wanted to conceive    Depression     Diabetes in pregnancy     Diabetes type I ([facility] [place] [address]     Age 7; on insulin pump    HX OTHER MEDICAL     late pnc at 25 weeks    HX OTHER MEDICAL     polyhydramnios; previous pregnancy    Unspecified epilepsy without mention of intractable epilepsy     diabetic related       Past Surgical History:   Procedure Laterality Date     DELIVERY ONLY  , 14    2 cesareans         Family History:   Problem Relation Age of Onset    Hypertension Maternal Grandmother     High Cholesterol Maternal Grandmother     Hypertension Maternal Grandfather     High Cholesterol Maternal Grandfather     No Known Problems Mother     No Known Problems Father     Cancer Paternal Grandfather     Other Sister      stomach issues/gluten-lactose sensitive       Social History     Social History    Marital status: SINGLE     Spouse name: N/A    Number of children: N/A    Years of education: N/A     Occupational History    Not on file. Social History Main Topics    Smoking status: Current Every Day Smoker     Packs/day: 0.50     Years: 7.00     Types: Cigarettes    Smokeless tobacco: Never Used    Alcohol use No    Drug use: No    Sexual activity: Yes     Partners: Male     Birth control/ protection: Pill     Other Topics Concern    Not on file     Social History Narrative         ALLERGIES: Other medication and Sulfa (sulfonamide antibiotics)    Review of Systems   Constitutional: Positive for fatigue. Negative for appetite change, chills and fever. HENT: Negative for congestion, rhinorrhea and sore throat. Respiratory: Negative for cough and shortness of breath. Cardiovascular: Positive for palpitations. Negative for leg swelling. Gastrointestinal: Positive for nausea and vomiting. Negative for abdominal pain and constipation. Genitourinary: Negative for difficulty urinating and dysuria. Musculoskeletal: Positive for arthralgias. Negative for back pain and neck pain. Skin: Negative for rash and wound. Neurological: Negative for headaches. All other systems reviewed and are negative. Vitals:    07/26/17 2310   BP: (!) 74/42   Pulse: (!) 118   Resp: 24   SpO2: 95%            Physical Exam   Nursing note and vitals reviewed. CONSTITUTIONAL: Well-appearing; well-nourished; in mild distress  HEAD: Normocephalic; atraumatic  EYES: PERRL; EOM intact; conjunctiva and sclera are clear bilaterally. ENT: No rhinorrhea; normal pharynx with no tonsillar hypertrophy; mucous membranes pink/dry, no erythema, no exudate. NECK: Supple; non-tender; no cervical lymphadenopathy  CARD: Normal S1, S2; no murmurs, rubs, or gallops. Increased Regular rate and rhythm. RESP: Normal respiratory effort; breath sounds clear and equal bilaterally; no wheezes, rhonchi, or rales.   ABD: Normal bowel sounds; non-distended; non-tender; no palpable organomegaly, no masses, no bruits. Back Exam: Normal inspection; no vertebral point tenderness, no CVA tenderness. Normal range of motion. EXT: Normal ROM in all four extremities; non-tender to palpation; no swelling or deformity; distal pulses are normal, no edema. SKIN: Warm; dry; no rash; decreased skin turgor and pallor. NEURO:Alert and oriented x 3, coherent, FLORA-XII grossly intact, sensory and motor are non-focal.        MDM  Number of Diagnoses or Management Options  Diabetic ketoacidosis without coma associated with diabetes mellitus due to underlying condition [facility]):   Diagnosis management comments: Assessment: 71-year-old female with history of diabetes, and noncompliance with medication, who presents with intractable nausea, vomiting, dehydration, andlow blood pressure. The patient has signs and symptoms consistent with DKA rule out sepsis and dehydration      Plan: EKG/ chest x-ray/ lab/ IV fluid/ insulin bolus and drip/ consult hospitalist/ Monitor and Reevaluate.          Amount and/or Complexity of Data Reviewed  Clinical lab tests: ordered and reviewed  Tests in the radiology section of CPT®: ordered and reviewed  Tests in the medicine section of CPT®: reviewed and ordered  Discussion of test results with the performing providers: yes  Decide to obtain previous medical records or to obtain history from someone other than the patient: yes  Obtain history from someone other than the patient: yes  Review and summarize past medical records: yes  Discuss the patient with other providers: yes  Independent visualization of images, tracings, or specimens: yes    Risk of Complications, Morbidity, and/or Mortality  Presenting problems: moderate  Diagnostic procedures: moderate  Management options: moderate    Critical Care  Total time providing critical care: (Total critical care time spend exclusive of procedures: 45 minutes)    Patient Progress  Patient progress: stable    ED Course       Procedures     ED EKG interpretation:  Rhythm: sinus tachycardia; and regular . Rate (approx.): 104; Axis: normal; P wave: normal; QRS interval: normal ; ST/T wave: non-specific changes; in  Lead: Diffusely; Other findings: abnormal ekg. This EKG was interpreted by Dyllan Jesus MD,ED Provider. XRAY INTERPRETATION (ED MD)  Chest Xray  No acute process seen. Normal heart size. No bony abnormalities. No infiltrate. Dyllan Jesus MD 12:01 AM    PROGRESS NOTE:  Pt has been reexamined by Dyllan Jesus MD all available results have been reviewed with pt and any available family. The patient is in DKA with dehydration and hypotension, rule out sepsis. Pt understands sx, dx, and tx in ED. Care plan has been outlined and questions have been answered. Pt and any available family understands and agrees to need for admission to hospital for further tx not available in ED. Pt is ready for admission. Written by Dyllan Jesus MD,  2:35 AM    CONSULT NOTE:  Dyllan Jesus MD spoke with Dr. Precious Delvalle of the adult hospitalist team. Discussed patient's presentation, history, physical assessment, and available diagnostic results.  He will evaluate, write orders and admit the patient to the hospital. 2:35 AM    .

## 2017-07-27 NOTE — PROGRESS NOTES
Care Management ED Assessment    Readmission  Previous Admission:  7/15/17-7/18-17 DKA  RRAT 15/6/0  Uses Clinic for health care/prescriptions/glucometer/syringes/lancets/test strips    Lea Mayen is a 23year old female to [facility] ED via EMS with chief complaint of vomiting. Patient complains of nausea and vomiting that began earlier today. Patient states she has also been feeling fatigued. ED workup:  Labs, blood cultures, CXR, ABG.  IVF, Insulin bolus, placed on drip. Consulted hospitalist for admission. PCP:  Uses [facility]    Care Management Interventions  PCP Verified by CM: Yes (PCP:  [facility])  Last Visit to PCP: 07/12/17  Transition of Care Consult (CM Consult): Discharge Planning (No CM Consult, Readmission:  RRAT 15/6/0, readmission 7/15-7/18, did not follow up at Clinic for appointment on 7/19/17)  MyChart Signup: No  Discharge Durable Medical Equipment: No  Health Maintenance Reviewed: Yes  Physical Therapy Consult: No  Occupational Therapy Consult: No  Speech Therapy Consult: No  Current Support Network: Other (Lives with friend in hotel, she gave little to no information during CM interview.)  Plan discussed with Pt/Family/Caregiver: Yes (Met with patient to discuss plan to help prevent readmissions and help her fell better. Discussed follow up at [facility].  )    Follow up appointment made, spoke with Kim Schroeder at clinic and patient is familiar to them. First available appointment 8/9/17 7:00PM.  Updated AVS.  Patient did not show up for last visit on 7/19/17. Care Management will continue to follow and assist with discharge planning.     Miladys Donahue, RN, BSN, [facility] [place]  ED Care Management  [phone]

## 2017-07-27 NOTE — PROGRESS NOTES
Bedside, Verbal and Written shift change report given to Jeovany (oncoming nurse) by Mark Mahmood (offgoing nurse). Report included the following information SBAR, Kardex, MAR and Recent Results.

## 2017-07-27 NOTE — ED TRIAGE NOTES
Triage Note: Pt coming to ED via EMS with complaints of hyperglycemia, +N/V, and fatigue that began today;  per EMS; Pt appears very lethargic in triage; BP 74/42

## 2017-07-27 NOTE — PROGRESS NOTES
Primary Nurse Jessica Contreras RN and Humaira CRUZ performed a dual skin assessment on this patient Impairment noted- see wound doc flow sheet  Jimmy score is 23

## 2017-07-27 NOTE — H&P
[address]   [address], [address]   HISTORY AND PHYSICAL       Name:  Edgar Cleveland   MR#:  [id number]   :  1990   Account #:  [de-identified]        Date of Adm:  2017       CHIEF COMPLAINT: The patient does not provide HPI. HISTORY OF PRESENT ILLNESS: A 43-year-old white female with   past medical history of bipolar disorder, depression, type 1 diabetes   mellitus, presented to the emergency department via EMS with   reported chief complaint of nausea and vomiting. The patient is a poor   historian. Poorly responsive to questions abuse. As such, the majority   of history has been obtained from review of ED medical reports. The   patient has a known history of type 1 diabetes mellitus with prior   history of DKA. She was last hospitalized for the same on 07/15/2017   to 2017 with DKA, seizure disorder, leukocytosis, lactic acidosis,   vaginal yeast infection, underweight, hypokalemia, anemia. Tonight, on   arrival in the emergency department, the patient had a workup   including initial serum blood glucose, which equals 262 mg per deciliter   with a serum CO2 of 9 and anion gap of 25 mg. The patient had elevated BUN of 22 and creatinine of 1.14, WBC of   19,900 and neutrophils 80%. The patient was started on 0.9% normal   saline, started on 3000 mL IV fluid bolus, given Reglan 10 mg IV for   noted nausea and vomiting, regular insulin 10 units IV. The patient is   now seen for admission to our hospitalist service for continued   evaluation and treatment. Again, she is not answering questions   appropriately. A complete review of systems was not obtained. PAST MEDICAL HISTORY   1. Acute renal failure. 2. Bipolar disorder. 3. Type 1 diabetes mellitus. 4. Polyhydramnios - with previous pregnancy. 5. Seizure disorder. 6. Vaginal yeast infection. 7. Underweight. 8. Peripheral neuropathy. 9. Anemia. 10. DKA.      PAST SURGICAL HISTORY: Status post  sections x2. MEDICATIONS   1. Humalog sliding scale coverage. 2. Insulin NPH 18 units subcu before breakfast and dinner. 3. Lamictal   200 mg 1 tablet p.o. b.i.d. ALLERGIES: SULFA DRUGS. SOCIAL HISTORY: Positive for smoking cigarettes, approximately half   a pack a day. Denies alcohol. Denies illicit drugs. FAMILY HISTORY: Hypertension in maternal grandmother. Hyperlipidemia, maternal grandmother and maternal grandfather. Hypertension - maternal grandfather. Cancer in paternal grandfather. Gluten sensitivity - sister. REVIEW OF SYSTEMS: Unable to obtain a complete review of   systems as patient is lethargic, difficult to arouse and not   answering questions appropriately. PHYSICAL EXAMINATION   VITAL SIGNS: Temperature is 97.6 degrees Fahrenheit. Blood   pressure 127/79, heart rate 104, respiratory rate of 16, O2 saturations   100% on room air. Recorded weight of 115 pounds (52.2   KG). Reported height of 5 feet 6 inches tall. GENERAL: Ill-appearing, underweight female in no acute respiratory   distress. The patient was lethargic, initially difficult to   arouse. Upon arousal, slowly orients x3. NEUROLOGIC: GCS best response 10, (A2, V3 and 5) with eye   opening to tractile stimuli, confused speech and localizes, but does not   routinely follow commands. Moves extremities x4 with generalized   weakness. Sensation is grossly intact without slurred speech or facial   droop. HEENT: Normocephalic, atraumatic. Pupils are 2 mm, reactive. Sclerae is anicteric, conjunctivae clear. Nares are patent. Oropharynx   reveals tongue is midline, nonedematous. NECK: Supple, without lymphadenopathy, JVD, carotid bruit or   thyromegaly. LYMPH: Negative for cervical or supraclavicular adenopathy. RESPIRATORY: Lungs clear to auscultation bilaterally. CARDIOVASCULAR: Heart is tachycardic, regular rhythm. No   murmurs, rubs or gallops.    GI: Abdomen soft, nontender, nondistended, normoactive bowel   sounds. No rebound, guarding or rigidity. No auscultated abdominal   bruit. No palpable abdominal mass. BACK: No CVA tenderness. No step off deformity. MUSCULOSKELETAL: No acute palpable bony deformity. Negative for   calf tenderness. VASCULAR: 2+ radial to 1+ dorsalis pedis pulses, without   cyanosis, clubbing, edema. SKIN: Warm and dry. There is an open wound on the lateral aspect of   the left foot with grayish exudate. Old dressings were removed and   applied new dressings during exam.     LABORATORY DATA REVIEWED AS FOLLOWS: Sodium 140,   potassium 3.2, chloride 106, CO2 9, BUN of 22, creatinine 1.14,   glucose 252, anion gap of 25, calcium is 8.7. GFR 58, total bilirubin   0.4, total protein 6.3, albumin 2.7, ALT 27, AST 21, alkaline   phosphatase of 147. WBC of 19.9, hemoglobin of 11.5, hematocrit   33.4, platelets of 926, neutrophils 80%, bands 5%. Urinalysis:   Leukocyte esterase negative, nitrites negative, urobilinogen 0.2,   bilirubin negative, blood large, ketones greater than 80, glucose   greater than 1000, protein 100, pH of 5.5, specific gravity 1.022, WBCs   0-4, RBCs 0-5, bacteria negative. ABGs: pH 7.230, pCO2 of 24.6, pO2 of 115, bicarbonate is 10.38,   base deficit -17, SaO2 of 98% on room air. Chest x-ray portable: No acute cardiopulmonary disease. EKG 12-lead: Sinus tachycardia at 104 beats a minute. Echocardiograms on 03/13/2017 showed left ventricular ejection   fraction of 65% to 70%. IMPRESSION AND PLAN   1. Diabetic ketoacidosis - type 1. ICU. The patient will be on regular   insulin IV infusion. Continue with IV fluid hydration resuscitation. The   patient will also be placed on neurovascular checks and fall   precautions. Repeat BNP. Continue to monitor closely on insulin. IV   infusion via hospital protocol until anion gap has corrected. 2. Acute metabolic acidosis noted. Continue with plan of care as noted   above.     3. Leukocytosis. No direct source of infection identified at this time. Repeat CBC and monitor closely. At current, the patient is afebrile. Monitor closely. 4. Dehydration. Again, continue with IV fluid hydration resuscitation. Repeat renal panel in a.m.   5. Hypokalemia. Replace potassium and repeat BNP. 6. Sinus tachycardia. Continue telemetry. 7. Nausea and vomiting, currently resolved. Continue with Reglan   p.r.n.   8. Tobacco abuse. Encouraged smoking cessation. Order nicotine   patch. 9. VTE prophylaxis. SCDs to lower extremities. 10. Left foot wound. Continue wound care and dressing changes. BEAU Chapa MD MP / [email or web address].Jacky   D:  07/27/2017   07:31   T:  07/27/2017   11:53   Job #:  [id number]

## 2017-07-28 PROBLEM — E10.10 DKA, TYPE 1 (HCC): Status: RESOLVED | Noted: 2017-01-01 | Resolved: 2017-01-01

## 2017-07-28 NOTE — PROGRESS NOTES
Pt has been off unit since first rounding for report this am. Pt is getting gastric emptying study. . Pt will get K supplements when she returns to unit.

## 2017-07-28 NOTE — DISCHARGE INSTRUCTIONS
Diabetic Ketoacidosis (DKA): Care Instructions  Your Care Instructions  Diabetic ketoacidosis (DKA) happens when the body does not have enough insulin and can't get the sugar it needs for energy. When the body can't use sugar for energy, it starts to use fat for energy. This process makes fatty acids called ketones. The ketones build up in the blood and change the chemical balance in your body. This problem can be very dangerous and needs to be treated. Without treatment, it can lead to a coma or death. DKA occurs most often in people with type 1 diabetes. But people with type 2 diabetes also can get it. DKA can be caused by many things. It can happen if you don't take enough insulin. It can also happen if you have an infection or illness like the flu. Sometimes it happens if you are very dehydrated. DKA can only be treated with insulin and fluids. These are often given in a vein (IV). Follow-up care is a key part of your treatment and safety. Be sure to make and go to all appointments, and call your doctor if you are having problems. It's also a good idea to know your test results and keep a list of the medicines you take. How can you care for yourself at home? To reduce your chance of ketoacidosis:  · Take your insulin and other diabetes medicines on time and in the right dose. ¨ If an infection caused your DKA and your doctor prescribed antibiotics, take them as directed. Do not stop taking them just because you feel better. You need to take the full course of antibiotics. · Test your blood sugar before meals and at bedtime or as often as your doctor advises. This is the best way to know when your blood sugar is high so you can treat it early. Watching for symptoms is not as helpful. This is because you may not have symptoms until your blood sugar is very high. Or you may not notice them. · Teach others at work and at home how to check your blood sugar.  Make sure that someone else knows how do it in case you can't. · Wear or carry medical identification at all times. This is very important in case you are too sick or injured to speak for yourself. · Talk to your doctor about when you can start to exercise again. · Eat regular meals that spread your calories and carbohydrate throughout the day. This will help keep your blood sugar steady. · When you are sick:  ¨ Take your insulin and diabetes medicines. This is important even if you are vomiting and having trouble eating or drinking. Your blood sugar may go up because you are sick. If you are eating less than normal, you may need to change your dose of insulin. Talk with your doctor about a plan when you are well. Then you will know what to do when you are sick. ¨ Drink extra fluids to prevent dehydration. These include water, broth, and sugar-free drinks. If you don't drink enough, the insulin from your shot may not get into your blood. So your blood sugar may go up. ¨ Try to eat as you normally do, with a focus on healthy food choices. ¨ Check your blood sugar at least every 3 to 4 hours. Check it more often if it's rising fast. If your doctor has told you to take an extra insulin dose for high blood sugar levels (for example, above 240 mg/dL) be sure to take the right amount. If you're not sure how much to take, call your doctor. ¨ Check your temperature and pulse often. If your temperature goes up, call your doctor. You may be getting worse. ¨ If you take insulin, check your urine or blood for ketones, especially when you have high blood sugar (for example, above 240 mg/dL). Call your doctor if your ketone level is moderate or high. If you know your blood sugar is high, treat it before it gets worse. · If you missed your usual dose of insulin or other diabetes medicine, take the missed dose or take the amount your doctor told you to take if this happens.   · If you and your doctor decide on a dose of extra-fast-acting insulin, give yourself the right dose. If you take insulin and your doctor has not told you how much fast-acting insulin to take based on your blood sugar level, call your doctor. · Drink extra water or sugar-free drinks to prevent dehydration. · Wait 30 minutes after you take extra insulin or missed medicines. Then check your blood sugar again. · If symptoms of high blood sugar get worse or your blood sugar level keeps rising, call your doctor. If you start to feel sleepy or confused, call 911. When should you call for help? Call 911 anytime you think you may need emergency care. For example, call if:  · You passed out (lost consciousness). · You are confused or cannot think clearly. · Your blood sugar is very high or very low. Watch closely for changes in your health, and be sure to contact your doctor if:  · Your blood sugar stays outside the level your doctor set for you. · You have any problems. Where can you learn more? Go to http://uriel[employer]rec[employer] Pharmaceuticalsreymundo.info/. Rosa Wan in the search box to learn more about \"Diabetic Ketoacidosis (DKA): Care Instructions. \"  Current as of: March 13, 2017  Content Version: 11.3  © 6841-0136 [employer]. Care instructions adapted under license by [employer] (which disclaims liability or warranty for this information). If you have questions about a medical condition or this instruction, always ask your healthcare professional. [place] [address] any warranty or liability for your use of this information.        Discharge Instructions       PATIENT ID: Dakotah Cheng  MRN: [id number]   YOB: 1990    DATE OF ADMISSION: 7/26/2017 11:25 PM    DATE OF DISCHARGE: 7/28/2017    PRIMARY CARE PROVIDER: PROVIDER UNKNOWN     ATTENDING PHYSICIAN: Diya Stover MD  DISCHARGING PROVIDER: Diya Stover MD    To contact this individual call [phone] and ask the  to page.    If unavailable ask to be transferred the Adult Hospitalist Department. DISCHARGE DIAGNOSES   Diabetic ketoacidosis  Possible gastroparesis   Uncontrolled diabetes  Nausea and vomiting      CONSULTATIONS: IP CONSULT TO HOSPITALIST    PROCEDURES/SURGERIES: * No surgery found *    PENDING TEST RESULTS:   At the time of discharge the following test results are still pending: none    FOLLOW UP APPOINTMENTS:   Follow-up Information     Follow up With Details Comments Contact Info    Rosas Engel On 8/9/2017 Please arrive at clinic at 6:45PM for 7:00 estimated appointment time [address]  [phone]           ADDITIONAL CARE RECOMMENDATIONS:   -Take your insulin as prescribed. Check your blood sugar at least 4 times daily(before breakfast,lunch,dinner and at bedtime),keep record to take it to your appointment with your doctor. They will make adjustment of your insulin regimen your blood sugar records and your A1C. -You probable have mild gastroparesis as a result of the diabetes. You need a tighter blood sugar control so the gastroparesis does not become a serious problem.          Gastroparesis: Care Instructions  Your Care Instructions    When you have gastroparesis, your stomach takes a lot longer to empty. This delay can cause belly pain, bloating, and belching. It also can cause hiccups, heartburn, nausea or vomiting. You may not feel like eating. These symptoms may come and go. They most often occur during and after meals. You may feel full after only a few bites of food. This condition occurs when the nerves to the stomach don't work properly. Diabetes is the most common cause of this nerve damage. Gastroparesis can make it harder to control your blood sugar levels. But keeping your blood sugar levels under control may help with your symptoms. Parkinson's disease, stroke, and some medicines can also cause this condition. Home treatment can often help. Follow-up care is a key part of your treatment and safety. Be sure to make and go to all appointments, and call your doctor if you are having problems. It's also a good idea to know your test results and keep a list of the medicines you take. How can you care for yourself at home? · Eat several small meals each day rather than three large meals. · Eat foods that are low in fiber and fat. · If your doctor suggests it, take medicines that help the stomach empty more quickly. These are called motility agents. When should you call for help? Call your doctor now or seek immediate medical care if:  · You have new or worse belly pain. · Your belly pain lasts longer than 24 hours and is not getting better. Watch closely for changes in your health, and be sure to contact your doctor if:  · You have digestive problems that get worse or occur more often. · You are losing weight. Where can you learn more? Go to http://uriel-reymundo.info/. Enter M106 in the search box to learn more about \"Gastroparesis: Care Instructions. \"  Current as of: August 9, 2016  Content Version: 11.3  © 9050-7872 [employer]. Care instructions adapted under license by [employer] (which disclaims liability or warranty for this information). If you have questions about a medical condition or this instruction, always ask your healthcare professional. [address] any warranty or liability for your use of this information.       DIET: Diabetic Diet    ACTIVITY: Activity as tolerated    WOUND CARE: NA    EQUIPMENT needed: NA      DISCHARGE MEDICATIONS:   See Medication Reconciliation Form    · It is important that you take the medication exactly as they are prescribed. · Keep your medication in the bottles provided by the pharmacist and keep a list of the medication names, dosages, and times to be taken in your wallet. · Do not take other medications without consulting your doctor.        NOTIFY YOUR PHYSICIAN FOR ANY OF THE FOLLOWING: Fever over 101 degrees for 24 hours. Chest pain, shortness of breath, fever, chills, nausea, vomiting, diarrhea, change in mentation, falling, weakness, bleeding. Severe pain or pain not relieved by medications. Or, any other signs or symptoms that you may have questions about. DISPOSITION:   x Home With:   OT  PT  HH  RN       SNF/Inpatient Rehab/LTAC    Independent/assisted living    Hospice    Other:         Signed:    Alana Alan MD  7/28/2017  5:30 PM

## 2017-07-28 NOTE — PROGRESS NOTES
Chart reviewed. CM met with pt and provided her with the care card application. [facility] 7:00PM. Pt lives with her friend in a hotel and will return there at discharge. Care management will be available if additional needs arise.     Toney Arevaloo, BSW/CRM

## 2017-07-28 NOTE — DISCHARGE SUMMARY
Discharge Summary       PATIENT ID: Edwrenard Lawton  MRN: [id number]   YOB: 1990    DATE OF ADMISSION: 7/26/2017 11:25 PM    DATE OF DISCHARGE: 7/28/2017  PRIMARY CARE PROVIDER: PROVIDER UNKNOWN     ATTENDING PHYSICIAN: Heather Stiles MD  DISCHARGING PROVIDER: Heather Stiles MD    To contact this individual call [phone] and ask the  to page. If unavailable ask to be transferred the Adult Hospitalist Department. CONSULTATIONS: IP CONSULT TO HOSPITALIST    PROCEDURES/SURGERIES: * No surgery found *    ADMITTING [address] [place] [address] COURSE:      Admission Summary:   Guanako Putnam is a 23year old female, known Type I diabetic. She presented with nausea and fatigue. She was found to be in DKA. This is the 5th admission in our system over the last 5-6 months for same. She was most recently admitted from 7/15-7/18. Assessment & Plan:   DKA with uncontrolled type I DM. Resolved  -Due to non adherence,non compliance. She was recently discharged on Novolin N 18 units ACBID  -Iv fluids. Had received 10 units of regular insulin  -She was not on insulin gtt. DKA resolved with a one time iv insulin,and aggressive IV hydration. Home regimen Novolin resumed. In addition we used Humalog sliding scale.  -Educated about compliance,long and short term complication of uncontrolled diabetes,that DKA is life threatening condition      Metabolic acidosis due to lactic acidosis. resolved with IV hydration and bicarb       Hypokalemia:replaced and corrected. Hypomagnesemia:replaced     Nausea and vomiting,could be from DKA/hyperglycemia or conversely she may have gastroparesis and n/v is contributing for the DKA  -Gastric emptying showed mild gastroparesis. Body mass index is 18.56 kg/(m^2).   -Underweight          DISCHARGE DIAGNOSES / PLAN:      See above       PENDING TEST RESULTS:   At the time of discharge the following test results are still pending: none    FOLLOW UP APPOINTMENTS:    Follow-up Information     Follow up With Details Comments [address]ars[address] On 8/9/2017 Please arrive at clinic at 6:45PM for 7:00 estimated appointment time [address]  [phone]    Provider Unknown   Patient not available to ask             ADDITIONAL CARE RECOMMENDATIONS:     DIET: Diabetic Diet    ACTIVITY: Activity as tolerated    WOUND CARE: NA    EQUIPMENT needed: NA      DISCHARGE MEDICATIONS:  Current Discharge Medication List      CONTINUE these medications which have NOT CHANGED    Details   insulin NPH (NOVOLIN N, HUMULIN N) 100 unit/mL injection 18 Units by SubCUTAneous route Before breakfast and dinner. Qty: 3 Vial, Refills: 2      lamoTRIgine (LAMICTAL) 200 mg tablet Take 1 Tab by mouth two (2) times a day. Qty: 60 Tab, Refills: 1      insulin lispro (HUMALOG) 100 unit/mL injection by SubCUTAneous route. 1 unit per 30 carbs               NOTIFY YOUR PHYSICIAN FOR ANY OF THE FOLLOWING:   Fever over 101 degrees for 24 hours. Chest pain, shortness of breath, fever, chills, nausea, vomiting, diarrhea, change in mentation, falling, weakness, bleeding. Severe pain or pain not relieved by medications. Or, any other signs or symptoms that you may have questions about.     DISPOSITION:  x  Home With:   OT  PT  HH  RN       Long term SNF/Inpatient Rehab    Independent/assisted living    Hospice    Other:       PATIENT CONDITION AT DISCHARGE:     Functional status    Poor     Deconditioned    x Independent      Cognition    x Lucid     Forgetful     Dementia      Catheters/lines (plus indication)    Sampson     PICC     PEG    x None      Code status   x  Full code     DNR      PHYSICAL EXAMINATION AT DISCHARGE:     Visit Vitals    /83 (BP 1 Location: Right arm, BP Patient Position: Sitting)    Pulse 91    Temp 99.3 °F (37.4 °C)    Resp 18    Ht 5' 6\" (1.676 m)    Wt 52.2 kg (115 lb)    LMP  (LMP Unknown)    SpO2 100%  Breastfeeding No    BMI 18.56 kg/m2      O2 Device: Room air    Temp (24hrs), Av.7 °F (37.1 °C), Min:98.2 °F (36.8 °C), Max:99.3 °F (37.4 °C)    701 - 1900  In: 900 [P.O.:900]  Out: -    1901 -  0700  In: 3053.3 [P.O.:1000; I.V.:2053.3]  Out: 1600 [Urine:1600]      Constitutional:  No acute distress, cooperative   Eyes: Non icteric,non pallor,no erythema,discharge,PERRLA   ENT:  Oral mucous moist, oropharynx benign. Neck supple,    Resp:  CTA bilaterally. No wheezing/rhonchi/rales. No accessory muscle use   CV:  Regular rhythm, normal rate, no murmurs, gallops, rubs    GI:  Soft, non distended, non tender. normoactive bowel sounds, no hepatosplenomegaly    :  No CVA or suprapubic tenderness   Skin  :  scattered abrasions on the legs    Musculoskeletal:  No edema, warm, 2+ pulses throughout    Neurologic:  AAOx3, CN II-XII reviewed. Moves all extremities. Hematologic/Lymphatic/Immunlogic:  No jaundice nor lymph node swelling            CHRONIC MEDICAL DIAGNOSES:  Problem List as of 2017  Date Reviewed: 2017          Codes Class Noted - Resolved    Hypothermia ICD-10-CM: T68. Olga Mcdermott  ICD-9-CM: 991.6  7/15/2017 - Present        DKA (diabetic ketoacidoses) ([facility] ICD-10-CM: E13.10  ICD-9-CM: 250.10  2017 - Present        Acute renal failure (ARF) ([facility] ICD-10-CM: N17.9  ICD-9-CM: 584.9  2017 - Present        ARF (acute renal failure) (HCC) ICD-10-CM: N17.9  ICD-9-CM: 584.9  2017 - Present        Dehydration ICD-10-CM: E86.0  ICD-9-CM: 276.51  2017 - Present        Acidosis, metabolic OCX-21-EQ: V73.4  ICD-9-CM: 276.2  2017 - Present        Leukocytosis ICD-10-CM: D72.829  ICD-9-CM: 288.60  2017 - Present        Acute encephalopathy ICD-10-CM: G93.40  ICD-9-CM: 348.30  2017 - Present        Lactic acidemia ICD-10-CM: E87.2  ICD-9-CM: 276.2  2017 - Present        Sinus tachycardia ICD-10-CM: R00.0  ICD-9-CM: 427.89  2/21/2017 - Present        SIRS (systemic inflammatory response syndrome) (HCC) ICD-10-CM: R65.10  ICD-9-CM: 995.90  2/21/2017 - Present        Mixed hyperlipidemia ICD-10-CM: E78.2  ICD-9-CM: 272.2  8/3/2016 - Present        Insulin pump in place ICD-10-CM: Z96.41  ICD-9-CM: V45.85  8/3/2016 - Present        Seizure disorder ([facility] ICD-10-CM: G40.909  ICD-9-CM: 345.90  12/25/2015 - Present        Anxiety disorder ICD-10-CM: F41.9  ICD-9-CM: 300.00  12/25/2015 - Present        OCD (obsessive compulsive disorder) ICD-10-CM: F42.9  ICD-9-CM: 300.3  12/25/2015 - Present        DKA, type 1, not at goal [facility]) ICD-10-CM: E10.10  ICD-9-CM: 250.13  Unknown - Present    Overview Addendum 12/25/2015  8:17 PM by Kristyn aCmacho MD     Type 1 DM diagnosed at age 7, on insulin pump since age 9             Bipolar disorder ([facility] ICD-10-CM: F31.9  ICD-9-CM: 296.80  Unknown - Present        Partial epilepsy with impairment of consciousness ([facility] ICD-10-CM: G40.209  ICD-9-CM: 345.40  2/12/2015 - Present        * (Principal)RESOLVED: DKA, type 1 ([facility] ICD-10-CM: E10.10  ICD-9-CM: 250.13  3/11/2017 - 7/28/2017        RESOLVED: DKA (diabetic ketoacidoses) ([facility] ICD-10-CM: E13.10  ICD-9-CM: 250.10  12/1/2015 - 12/25/2015        RESOLVED: Fatigue ICD-10-CM: R53.83  ICD-9-CM: 780.79  Unknown - 12/25/2015        RESOLVED: Muscle pain ICD-10-CM: M79.1  ICD-9-CM: 729.1  Unknown - 12/25/2015        RESOLVED: Group B Streptococcus carrier, +RV culture, currently pregnant ICD-10-CM: O99.820  ICD-9-CM: V23.89, V02.51  5/20/2013 - 12/25/2015        RESOLVED: Active labor ICD-10-CM: FME2379  ICD-9-CM: Vianey Child  5/20/2013 - 12/25/2015        RESOLVED: Diabetes in pregnancy ICD-10-CM: O24.919  ICD-9-CM: 648.00  3/19/2013 - 12/22/2015        RESOLVED: Seizures ([facility] ICD-10-CM: R56.9  ICD-9-CM: 780.39  3/19/2013 - 12/25/2015                Signed:    Collette Hamburg, MD  7/28/2017  5:37 PM

## 2017-07-29 NOTE — ROUTINE PROCESS
Discharge instructions given to patient. Patient verbalized understanding. Instructed patient to call when ready for discharge so we can assist her in the w/c . Verbalized she would. 2 pcts offered to assist ,  and RN asked her to call when ready to go. When walking down the song patient was not in room and left without letting staff escort her out.

## 2018-11-01 NOTE — ED NOTES
Assessment:  Pt recently lost her roommate and gained a new one, mother reports she has had trouble sleeping.  NH and pt did not mention this information, pt is a poor historian.  Pt has a longtime outpatient psychiatrist who saw her on 10/25/18.  Pt will be monitored for aggressive behavior while inpatient.       Plan:  Seroquel 300 mg po BID  Prozac 20 mg po daily  Depakote 2000 mg po daily  Inderal 40 mg po BID  Trazodone 100 po qhs PRN    Pt with previous diagnosis of Parkinson's disease, no evidence of Parkinsonian s/sx on interview/exam, no change evident after decreased dose.  Will discontinue Sinemet at this time and monitor closely.      Pt. Ventilated with 3 liters of saline infusing.  Patient initiated on propofol drip at 10 mcg/kg/min for sedation

## 2021-07-19 NOTE — PROGRESS NOTES
0930-Bedside shift change report given to [address] [place] [address] (oncoming nurse) by Kraig Talley. Yanira Hardwick RN (offgoing nurse). Report included the following information SBAR, Kardex, Intake/Output, MAR, Accordion, Recent Results, Med Rec Status and Cardiac Rhythm sinus tachycardia. 1015-Interdisciplinary team rounds were held 3/14/17 with the following team members:Care Management, Nursing, Nutrition, Pastoral Care, Pharmacy, Physical Therapy, Physician, Respiratory Therapy and Clinical Coordinator. Plan of care discussed. Goal: See MD orders and progress notes for further  interventions and desired outcomes. Patient instructed to let nurse know with next void. Urine specimen needed. 1200- Patient with working PIV. Right IJ Central Line removed per Dr. Juan A Valencia. 1212- Attempted to call report to Gayla Noonan. RN unavailable, but instructed to call. 1230- TRANSFER - OUT REPORT:    Verbal report given to Briana(name) on [facility]  being transferred to [facility](unit) for routine progression of care       Report consisted of patients Situation, Background, Assessment and   Recommendations(SBAR). Information from the following report(s) SBAR, Kardex, Intake/Output, MAR, Accordion, Recent Results and Med Rec Status was reviewed with the receiving nurse. Lines:   Peripheral IV 03/12/17 Right Arm (Active)   Site Assessment Clean, dry, & intact 3/14/2017 12:11 PM   Phlebitis Assessment 0 3/14/2017 12:11 PM   Infiltration Assessment 0 3/14/2017 12:11 PM   Dressing Status Clean, dry, & intact 3/14/2017 12:11 PM   Dressing Type Transparent 3/14/2017 12:11 PM   Hub Color/Line Status Pink;Flushed 3/14/2017 12:11 PM   Action Taken Open ports on tubing capped 3/14/2017  8:00 AM   Alcohol Cap Used Yes 3/14/2017  7:30 AM        Opportunity for questions and clarification was provided. Patient transported with:   Monitor  Registered Nurse     Notified Gayla Noonan that meal-time Lispro held until tray arrives.  POC 92 at present. SSI held. declines

## 2023-09-08 NOTE — ED NOTES
Assumed care of patient. Placed in position of comfort with call bell in reach. Patient/family made aware of plan of care for admission to CCU. Pt is alert and oriented x1 which has been her baseline all day. Pt has pure wick in place and 600ccs of urine in suction container. RN still awaiting bicarb drip from pharmacy. Pt on monitor x3 and roommate at bedside-door open for observation purposes as patient is disoriented and pulling at gown and not keeping blankets on. 4 warm blankets placed on patient which patient keeps pulling off-rectal temp is 95.8 but patient will not tolerate bear hugger. Insulin drip infusing with no difficulty and pt on monitor x3. Pt sinus tach on monitor in 120s-130s. RN will continue to monitor.
Called the lab, and added on the Lamictal level.
Dr. Tatianna Calles at the bedside to evaluate patient.
Hospitalist at bedside
Patient Kussmaul respirations are becoming stronger.  MD notified, Pharmacy encouraged to send insulin drip STAT
Patient roomate at the bedside.
Patients family stated that she \"may\" need to use the restroom, patient unable to relay the message. PureWick used on the patient.
Pt with episode of profuse vomiting at this time-Dr. Homero Dance notified and verbal order for 4mg of zofran IV given. Pt transported up by CCU RN and CCU tech at this time on the monitor. Pt in no acute distress.
RT called for venous blood gas
Spoke with Dr. Nora Gray about the sitter order, per MD that order is to be used when the patient begins to be more awake. The patient has a history of signed out AMA per EMS and history of Bipolar and MD wants to ensure that the patient remains safe as do the LDA's.
Spoke with the Pharmacy, they are working on the Bicarb drip
Spoke with the lab, the CMP and Lactic acid need to be redrawn
TRANSFER - OUT REPORT:    Verbal and bedside report given to [address] (name) on Samira Corley  being transferred to CCU (unit) for routine progression of care       Report consisted of patients Situation, Background, Assessment and   Recommendations(SBAR). Information from the following report(s) SBAR, ED Summary, MAR, Recent Results and Cardiac Rhythm sinus tach was reviewed with the receiving nurse. Lines:   Peripheral IV 02/21/17 Left Hand (Active)   Site Assessment Clean, dry, & intact 2/21/2017  4:45 PM   Phlebitis Assessment 0 2/21/2017  4:45 PM   Infiltration Assessment 0 2/21/2017  4:45 PM   Dressing Status Clean, dry, & intact 2/21/2017  4:45 PM   Hub Color/Line Status Pink 2/21/2017  4:45 PM       Peripheral IV 02/21/17 Right Wrist (Active)   Site Assessment Clean, dry, & intact 2/21/2017  4:46 PM   Phlebitis Assessment 0 2/21/2017  4:46 PM   Infiltration Assessment 0 2/21/2017  4:46 PM   Dressing Status Clean, dry, & intact 2/21/2017  4:46 PM   Hub Color/Line Status Pink 2/21/2017  4:46 PM        Opportunity for questions and clarification was provided.       Patient transported with:   Monitor  Registered Nurse  Tech
Tele-sitter set up at bedside. Pt sleeping in no acute distress on monitor x3.
08-Sep-2023 04:37
